# Patient Record
Sex: MALE | Race: WHITE | Employment: OTHER | URBAN - METROPOLITAN AREA
[De-identification: names, ages, dates, MRNs, and addresses within clinical notes are randomized per-mention and may not be internally consistent; named-entity substitution may affect disease eponyms.]

---

## 2017-03-07 ENCOUNTER — HOSPITAL ENCOUNTER (OUTPATIENT)
Dept: PHYSICAL THERAPY | Age: 82
Discharge: HOME | End: 2017-03-07
Payer: MEDICARE

## 2017-03-07 PROCEDURE — G8979 MOBILITY GOAL STATUS: HCPCS

## 2017-03-07 PROCEDURE — G8978 MOBILITY CURRENT STATUS: HCPCS

## 2017-03-07 PROCEDURE — 97162 PT EVAL MOD COMPLEX 30 MIN: CPT

## 2017-03-07 NOTE — PROGRESS NOTES
Ambulatory/Rehab Services H2 Model Falls Risk Assessment    Risk Factor Pts. ·   Confusion/Disorientation/Impulsivity  []    4 ·   Symptomatic Depression  []   2 ·   Altered Elimination  []   1 ·   Dizziness/Vertigo  []   1 ·   Gender (Male)  [x]   1 ·   Any administered antiepileptics (anticonvulsants):  []   2 ·   Any administered benzodiazepines:  []   1 ·   Visual Impairment (specify):  []   1 ·   Portable Oxygen Use  []   1 ·   Orthostatic ? BP  []   1 ·   History of Recent Falls (within 3 mos.)  []   5     Ability to Rise from Chair (choose one) Pts. ·   Ability to rise in a single movement  []   0 ·   Pushes up, successful in one attempt  [x]   1 ·   Multiple attempts, but successful  []   3 ·   Unable to rise without assistance  []   4   Total: (5 or greater = High Risk) 2     Falls Prevention Plan:   []                Physical Limitations to Exercise (specify):   []                Mobility Assistance Device (type):   []                Exercise/Equipment Adaptation (specify):    ©2010 [facility] [address] Patent #[id number].  Federal Law prohibits the replication, distribution or use without written permission from [facility]

## 2017-03-07 NOTE — PROGRESS NOTES
Chencho Harmon  : [facility] [address]  [phone]   [phone]        OUTPATIENT PHYSICAL THERAPY:Initial Assessment and Daily Note 3/7/2017    ICD-10: Treatment Diagnosis: low back pain M54.5  ICD-10: Treatment Diagnosis: stiffness in joint, right hip M25.651  ICD-10: Treatment Diagnosis: stiffness in joint, left hip M25.652  Precautions/Allergies:   Review of patient's allergies indicates no known allergies. Fall Risk Score: 2 (? 5 = High Risk)  MD Orders: evaluate and treat MEDICAL/REFERRING DIAGNOSIS:  Back pain [M54.9]   DATE OF ONSET: chronic  REFERRING PHYSICIAN: Nida Enriquez, *  RETURN PHYSICIAN APPOINTMENT: as needed     INITIAL ASSESSMENT:  Mr. Halie Sneed is a 81 y.o. male who presents to physical therapy with chronic low back, bilateral hip (left greater than right) stiffness and mid-thoracic spine discomfort. He presents with soft tissue restrictions, arthrokinematic dysfunctions in thoracic/lumbar spine and pelvis, postural deficits, strength and endurance deficits. Patient would benefit from skilled physical therapy to improve overall mobility and function. PROBLEM LIST (Impacting functional limitations):  1. Decreased Strength  2. Decreased ADL/Functional Activities  3. Decreased Ambulation Ability/Technique  4. Decreased Balance  5. Increased Pain  6. Decreased Activity Tolerance  7. Decreased Flexibility/Joint Mobility INTERVENTIONS PLANNED:  1. Balance Exercise  2. Heat  3. Home Exercise Program (HEP)  4. Manual Therapy  5. Neuromuscular Re-education/Strengthening  6. Range of Motion (ROM)  7. Therapeutic Activites  8. Therapeutic Exercise/Strengthening   TREATMENT PLAN:  Effective Dates: 3/7/2017 TO 2017. Frequency/Duration: 2 times a week for 6 weeks, progress to 1x a week for 6 weeks.   GOALS: (Goals have been discussed and agreed upon with patient.)  Short-Term Functional Goals: Time Frame: 4 weeks  1. Patient demonstrates independence with his HEP without verbal cueing from therapist.  2. Patient able to ambulate for 15 minutes without onset of low back pain. 3. Patient able to stand for 20 minute to perform household/daily activities with pain no more than 0-2/10  Discharge Goals: Time Frame: 12 weeks  1. Improve LE and core stability strength to 5/5 to perform ADLs  2. Patient able to ambulate for 30 minutes without onset of low back pain. 3. Patient demonstrates ability to perform balance activities for 2 minutes without loss of balance. 4. Improve LEFS score from 52/80 to 62/80 to perform ADLs  Rehabilitation Potential For Stated Goals: Excellent  Regarding Bailey Vega 's therapy, I certify that the treatment plan above will be carried out by a therapist or under their direction. Thank you for this referral,  Chris Martinez PT     Referring Physician Signature: Michela Castellano., *              Date                    The information in this section was collected on 3/7/2017 (except where otherwise noted). HISTORY:   History of Present Injury/Illness (Reason for Referral):  Chronic history of low back pain and mid-thoracic spine pain. Notes increased discomfort and tightness through low back, left greater than right and into left hip, especially with weight bearing and walking. Patient notes he has had both of his hips replaced and has had continued challenges with his left hip. He is an avid golfer, however has been limited secondary to his back pain. Patient denies dizziness, drop attacks, numbness/tingling, bowel/bladder dysfunction and/or unexplained weight gain/loss. Past Medical History/Comorbidities:   Mr. Elidia Morrell  has a past medical history of Abnormal cardiovascular function study (3/18/2016); Arthritis; Coronary atherosclerosis of native coronary vessel (3/18/2016); Coronary atherosclerosis of native coronary vessel (3/18/2016);  Dyspnea on exertion; HLD (hyperlipidemia) (3/18/2016); Hypertension; Seizures ([address] and Upper respiratory infection. Mr. Lyle Coombs  has a past surgical history that includes orthopaedic and other surgical. bilateral total hip replacements. Social History/Living Environment:     [facility]. No stairs in home. Prior Level of Function/Work/Activity:  Independent with tasks, however challenged with endurance and performance of activities secondary to pain levels. 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 Works out 1-2x a week, attempts to walk 4-5x a week. Dominant Side:         LEFT  Other Clinical Tests:          X-rays- arthritic changes in lumbar spine and hips. Current Medications:       Current Outpatient Prescriptions:     atorvastatin (LIPITOR) 40 mg tablet, Take 1 Tab by mouth daily. , Disp: 30 Tab, Rfl: 5    fluticasone (FLONASE) 50 mcg/actuation nasal spray, , Disp: , Rfl:     donepezil (ARICEPT) 5 mg tablet, , Disp: , Rfl:     lisinopril-hydrochlorothiazide (PRINZIDE, ZESTORETIC) 20-12.5 mg per tablet, , Disp: , Rfl:     tamsulosin (FLOMAX) 0.4 mg capsule, , Disp: , Rfl:     traZODone (DESYREL) 100 mg tablet, , Disp: , Rfl:     atenolol (TENORMIN) 25 mg tablet, Take 1 Tab by mouth daily. , Disp: 90 Tab, Rfl: 3    rosuvastatin (CRESTOR) 20 mg tablet, take 20 mg by mouth daily. , Disp: , Rfl:     olmesartan-hydrochlorothiazide (BENICAR HCT) 40-12.5 mg per tablet, take 1 Tab by mouth two (2) times a day., Disp: , Rfl:     NAPROXEN SODIUM (ALEVE PO), take  by mouth daily as needed. , Disp: , Rfl:    Date Last Reviewed:  3/7/2017     Number of Personal Factors/Comorbidities that affect the Plan of Care: 0: LOW COMPLEXITY   EXAMINATION:   Observation/Orthostatic Postural Assessment:          Patient denies any LE pain, paresthesia or symptoms. Patient denies any increase of symptoms with cough, sneeze or valsalva.  Patient denies any saddle paresthesia or bowel/bladder deficits. Patient exhibits a decreased lumbar lordosis and increased thoracic kyphosis. Lower extremity weight bearing is symmetrical. Shoulder symmetry exhibits right elevated. Observation of gait indicates decreased pelvic mobility (patient is a hip walker versus an efficient trunk walker). Lower quadrant bony landmarks are right iliac crest elevated compared to left, level greater trochanters in standing. Supine left malleolus superior compared to right Leg swing for innominate mobility indicates significant bilateral limitations in extension. Vertical compression test (VCT) for alignment is 2. Elbow flexion test (EFT) for motor activation is 2. Soft tissue observation indicates restrictions through bilateral iliopsoas, rectus femoris left greater than right, lumbar and thoracic spine paraspinals, left greater than right piriformis and hip rotators. Palpation: Myofascial assessment indicates restriction through mid-thoracic to lumbar spine superficial fascia. Muscle length testing in modified Berry position exhibits restricted left greater than right. Hamstring flexibility tested supine with straight leg raise (SLR) is restricted bilaterally, left 50, right 55 degrees. Piriformis length is restricted left greater than right. Quadriceps flexibility tested prone with heel to buttock is restricted bilaterally. Ely test is positive for rectus femoris tightness. Gastrocnemius and soleus flexibility are restricted, mild left greater than right. Sacrotuberous ligament is not tested today. Sacrococcygeal junction exhibits not tested today. Body of coccyx is not tested today. ROM: Passive trunk rotation is 70% available to the R and 70% available to the L. Kinetic testing in standing including forward bend test is positive left. Marcher's test is positive lefy. Pelvic shear test is standing exhibits decreased excursion of bilateral shear with a hard end feel.  Single plane active movements through lumbar spine in standing exhibit limited in all motions: flexion to mid-shin, extension limited by 50%. Functional squat for LE clearance is challenged with posterior translation of hip/pelvis. Lumbar positional testing at transverse processes indicates FRS right L4-5 with limitations in extension, rotation and side bending left, moderate restrictions. Sacral positional testing indicates mild right on right forward sacral torsion. Seated forward flexion test is positive left. Prone knee active flexion test is positive left. Spring testing of lumbar spine exhibits decreased p/a springs through L2-5. Spring testing of sacrum exhibits decreased p/a spring of left sacral base. Spring testing of innominates exhibits decreased left innominate spring. AROM (PROM) Right Left   Hip flexion/Innominate flexion 90 85   Hip extension/Innominate extension 5 5   Hip external rotation (ER) 20 15   Hip internal rotation (IR) 10 10   Hip abduction 40 40     Strength: Lumbar protective mechanism (LPM) are not tested today for initiation. LPM Strength */5   R anterior to posterior diagonal    L anterior to posterior diagonal    R posterior to anterior diagonal    L posterior to anterior diagonal      Manual Muscle Test (*/5) Right Left   Knee extension 4 4   Knee flexion 4+ 4+   Hip flexion 4 4-   Hip ER 4 4-   Hip IR 4 4-   Hip extension 3+ 3+   Hip abduction 4- 3+   Hip adduction 5 5   Ankle DF 5 5   Ankle PF 5 5   Core stability 3+/5     Special Tests:  Gonzella Running test is negative. Scours test is negative  Neurological Screen:  Myotomes: Key muscle strength testing through bilateral LE is intact. Dermatomes: Sensation testing through bilateral lower quadrants for light touch is intact. Reflexes: Patellar (L4) and Achilles (S1) are not tested*. Neural tension tests: Passive straight leg raise (SLR) test is negative. Slump test is negative.    Functional Mobility:  Challenged with squatting, prolonged standing, balance and ambulation. Body Structures Involved:  1. Nerves  2. Bones  3. Joints  4. Muscles Body Functions Affected:  1. Sensory/Pain  2. Neuromusculoskeletal  3. Movement Related Activities and Participation Affected:  1. Mobility  2. Community, Social and [place]   Number of elements (examined above) that affect the Plan of Care: 3: MODERATE COMPLEXITY   CLINICAL PRESENTATION:   Presentation: Evolving clinical presentation with changing clinical characteristics: MODERATE COMPLEXITY   CLINICAL DECISION MAKING:   Outcome Measure: Tool Used: Lower Extremity Functional Scale (LEFS)  Score:  Initial: 52/80 Most Recent: X/80 (Date: -- )   Interpretation of Score: 20 questions each scored on a 5 point scale with 0 representing \"extreme difficulty or unable to perform\" and 4 representing \"no difficulty\". The lower the score, the greater the functional disability. 80/80 represents no disability. Minimal detectable change is 9 points. Score 80 79-63 62-48 47-32 31-16 15-1 0   Modifier CH CI CJ CK CL CM CN     ? Mobility - Walking and Moving Around:     - CURRENT STATUS: CJ - 20%-39% impaired, limited or restricted    - GOAL STATUS: CI - 1%-19% impaired, limited or restricted    - D/C STATUS:  ---------------To be determined---------------    Medical Necessity:   · Patient is expected to demonstrate progress in strength, range of motion, balance and functional technique to increase independence with ADLs, prolonged standing and walking techniques. .  Reason for Services/Other Comments:  · Patient continues to require skilled intervention due to challenged with endurance and pain levels in weight bearing positions.    Use of outcome tool(s) and clinical judgement create a POC that gives a: Questionable prediction of patient's progress: MODERATE COMPLEXITY            TREATMENT:   (In addition to Assessment/Re-Assessment sessions the following treatments were rendered)    Pre-treatment Symptoms/Complaints:  Patient notes feels very tight and restricted throughout his hips and low back into his mid-thoracic spine. Pain: Initial:   Pain Intensity 1: 7  Pain Location 1: Hip, Spine, lumbar  Pain Orientation 1: Left, Posterior  Post Session:  3/10       Manual Therapy (    Soft Tissue Mobilization Duration  Duration: 5 Minutes): Manual techniques to facilitate improved motion and decreased pain. · Supine bilateral iliopsoas release with FMP of lower trunk rotation    (Used abbreviations: MET - muscle energy technique; PNF - proprioceptive neuromuscular facilitation; NMR - neuromuscular re-education; a/p - anterior to posterior; p/a - posterior to anterior, FMP - functional movement patterns)    Treatment/Session Assessment:    · Response to Treatment:  Improved anterior hip mobility and improved upright posture after manual techniques. · Compliance with Program/Exercises: compliant all of the time. · Recommendations/Intent for next treatment session: \"Next visit will focus on advancements to more challenging activities\".   Total Treatment Duration:  PT Patient Time In/Time Out  Time In: 0930  Time Out: 5500 Kesha Gerber, PT

## 2017-03-13 ENCOUNTER — HOSPITAL ENCOUNTER (OUTPATIENT)
Dept: PHYSICAL THERAPY | Age: 82
Discharge: HOME | End: 2017-03-13
Payer: MEDICARE

## 2017-03-13 PROCEDURE — 97140 MANUAL THERAPY 1/> REGIONS: CPT

## 2017-03-13 NOTE — PROGRESS NOTES
Jim Aquino  : [facility] [address] [place] [address]  [phone]   [phone]        OUTPATIENT PHYSICAL THERAPY:Daily Note 3/13/2017    ICD-10: Treatment Diagnosis: low back pain M54.5  ICD-10: Treatment Diagnosis: stiffness in joint, right hip M25.651  ICD-10: Treatment Diagnosis: stiffness in joint, left hip M25.652  Precautions/Allergies:   Review of patient's allergies indicates no known allergies. Fall Risk Score: 2 (? 5 = High Risk)  MD Orders: evaluate and treat MEDICAL/REFERRING DIAGNOSIS:  Back pain [M54.9]   DATE OF ONSET: chronic  REFERRING PHYSICIAN: Jose Antonio Oliveros., *  RETURN PHYSICIAN APPOINTMENT: as needed     INITIAL ASSESSMENT:  Mr. Marychuy Edmondson is a 81 y.o. male who presents to physical therapy with chronic low back, bilateral hip (left greater than right) stiffness and mid-thoracic spine discomfort. He presents with soft tissue restrictions, arthrokinematic dysfunctions in thoracic/lumbar spine and pelvis, postural deficits, strength and endurance deficits. Patient would benefit from skilled physical therapy to improve overall mobility and function. PROBLEM LIST (Impacting functional limitations):  1. Decreased Strength  2. Decreased ADL/Functional Activities  3. Decreased Ambulation Ability/Technique  4. Decreased Balance  5. Increased Pain  6. Decreased Activity Tolerance  7. Decreased Flexibility/Joint Mobility INTERVENTIONS PLANNED:  1. Balance Exercise  2. Heat  3. Home Exercise Program (HEP)  4. Manual Therapy  5. Neuromuscular Re-education/Strengthening  6. Range of Motion (ROM)  7. Therapeutic Activites  8. Therapeutic Exercise/Strengthening   TREATMENT PLAN:  Effective Dates: 3/7/2017 TO 2017. Frequency/Duration: 2 times a week for 6 weeks, progress to 1x a week for 6 weeks.   GOALS: (Goals have been discussed and agreed upon with patient.)  Short-Term Functional Goals: Time Frame: 4 weeks  1. Patient demonstrates independence with his HEP without verbal cueing from therapist.  2. Patient able to ambulate for 15 minutes without onset of low back pain. 3. Patient able to stand for 20 minute to perform household/daily activities with pain no more than 0-2/10  Discharge Goals: Time Frame: 12 weeks  1. Improve LE and core stability strength to 5/5 to perform ADLs  2. Patient able to ambulate for 30 minutes without onset of low back pain. 3. Patient demonstrates ability to perform balance activities for 2 minutes without loss of balance. 4. Improve LEFS score from 52/80 to 62/80 to perform ADLs  Rehabilitation Potential For Stated Goals: Excellent  Regarding [facility] therapy, I certify that the treatment plan above will be carried out by a therapist or under their direction. Thank you for this referral,  Bobby Price, PT     Referring Physician Signature: Idalia Vazquez., *              Date                    The information in this section was collected on 3/7/2017 (except where otherwise noted). HISTORY:   History of Present Injury/Illness (Reason for Referral):  Chronic history of low back pain and mid-thoracic spine pain. Notes increased discomfort and tightness through low back, left greater than right and into left hip, especially with weight bearing and walking. Patient notes he has had both of his hips replaced and has had continued challenges with his left hip. He is an avid golfer, however has been limited secondary to his back pain. Patient denies dizziness, drop attacks, numbness/tingling, bowel/bladder dysfunction and/or unexplained weight gain/loss. Past Medical History/Comorbidities:   Mr. Boby Akbar  has a past medical history of Abnormal cardiovascular function study (3/18/2016); Arthritis; Coronary atherosclerosis of native coronary vessel (3/18/2016); Coronary atherosclerosis of native coronary vessel (3/18/2016);  Dyspnea on exertion; HLD (hyperlipidemia) (3/18/2016); Hypertension; Seizures ([address] and Upper respiratory infection. Mr. Gerda Davalos  has a past surgical history that includes orthopaedic and other surgical. bilateral total hip replacements. Social History/Living Environment:     Lives at independent living facility [facility], with his wife. No stairs in home. Prior Level of Function/Work/Activity:  Independent with tasks, however challenged with endurance and performance of activities secondary to pain levels. 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 Works out 1-2x a week, attempts to walk 4-5x a week. Dominant Side:         LEFT  Other Clinical Tests:          X-rays- arthritic changes in lumbar spine and hips. Current Medications:       Current Outpatient Prescriptions:     atorvastatin (LIPITOR) 40 mg tablet, Take 1 Tab by mouth daily. , Disp: 30 Tab, Rfl: 5    fluticasone (FLONASE) 50 mcg/actuation nasal spray, , Disp: , Rfl:     donepezil (ARICEPT) 5 mg tablet, , Disp: , Rfl:     lisinopril-hydrochlorothiazide (PRINZIDE, ZESTORETIC) 20-12.5 mg per tablet, , Disp: , Rfl:     tamsulosin (FLOMAX) 0.4 mg capsule, , Disp: , Rfl:     traZODone (DESYREL) 100 mg tablet, , Disp: , Rfl:     atenolol (TENORMIN) 25 mg tablet, Take 1 Tab by mouth daily. , Disp: 90 Tab, Rfl: 3    rosuvastatin (CRESTOR) 20 mg tablet, take 20 mg by mouth daily. , Disp: , Rfl:     olmesartan-hydrochlorothiazide (BENICAR HCT) 40-12.5 mg per tablet, take 1 Tab by mouth two (2) times a day., Disp: , Rfl:     NAPROXEN SODIUM (ALEVE PO), take  by mouth daily as needed. , Disp: , Rfl:    Date Last Reviewed:  3/13/2017     Number of Personal Factors/Comorbidities that affect the Plan of Care: 0: LOW COMPLEXITY   EXAMINATION:   Observation/Orthostatic Postural Assessment:          Patient denies any LE pain, paresthesia or symptoms. Patient denies any increase of symptoms with cough, sneeze or valsalva.  Patient denies any saddle paresthesia or bowel/bladder deficits. Patient exhibits a decreased lumbar lordosis and increased thoracic kyphosis. Lower extremity weight bearing is symmetrical. Shoulder symmetry exhibits right elevated. Observation of gait indicates decreased pelvic mobility (patient is a hip walker versus an efficient trunk walker). Lower quadrant bony landmarks are right iliac crest elevated compared to left, level greater trochanters in standing. Supine left malleolus superior compared to right Leg swing for innominate mobility indicates significant bilateral limitations in extension. Vertical compression test (VCT) for alignment is 2. Elbow flexion test (EFT) for motor activation is 2. Soft tissue observation indicates restrictions through bilateral iliopsoas, rectus femoris left greater than right, lumbar and thoracic spine paraspinals, left greater than right piriformis and hip rotators. Palpation: Myofascial assessment indicates restriction through mid-thoracic to lumbar spine superficial fascia. Muscle length testing in modified Berry position exhibits restricted left greater than right. Hamstring flexibility tested supine with straight leg raise (SLR) is restricted bilaterally, left 50, right 55 degrees. Piriformis length is restricted left greater than right. Quadriceps flexibility tested prone with heel to buttock is restricted bilaterally. Ely test is positive for rectus femoris tightness. Gastrocnemius and soleus flexibility are restricted, mild left greater than right. Sacrotuberous ligament is not tested today. Sacrococcygeal junction exhibits not tested today. Body of coccyx is not tested today. ROM: Passive trunk rotation is 70% available to the R and 70% available to the L. Kinetic testing in standing including forward bend test is positive left. Marcher's test is positive lefy. Pelvic shear test is standing exhibits decreased excursion of bilateral shear with a hard end feel.  Single plane active movements through lumbar spine in standing exhibit limited in all motions: flexion to mid-shin, extension limited by 50%. Functional squat for LE clearance is challenged with posterior translation of hip/pelvis. Lumbar positional testing at transverse processes indicates FRS right L4-5 with limitations in extension, rotation and side bending left, moderate restrictions. Sacral positional testing indicates mild right on right forward sacral torsion. Seated forward flexion test is positive left. Prone knee active flexion test is positive left. Spring testing of lumbar spine exhibits decreased p/a springs through L2-5. Spring testing of sacrum exhibits decreased p/a spring of left sacral base. Spring testing of innominates exhibits decreased left innominate spring. AROM (PROM) Right Left   Hip flexion/Innominate flexion 90 85   Hip extension/Innominate extension 5 5   Hip external rotation (ER) 20 15   Hip internal rotation (IR) 10 10   Hip abduction 40 40     Strength: Lumbar protective mechanism (LPM) are not tested today for initiation. LPM Strength */5   R anterior to posterior diagonal    L anterior to posterior diagonal    R posterior to anterior diagonal    L posterior to anterior diagonal      Manual Muscle Test (*/5) Right Left   Knee extension 4 4   Knee flexion 4+ 4+   Hip flexion 4 4-   Hip ER 4 4-   Hip IR 4 4-   Hip extension 3+ 3+   Hip abduction 4- 3+   Hip adduction 5 5   Ankle DF 5 5   Ankle PF 5 5   Core stability 3+/5     Special Tests:  Ciara Brain test is negative. Scours test is negative  Neurological Screen:  Myotomes: Key muscle strength testing through bilateral LE is intact. Dermatomes: Sensation testing through bilateral lower quadrants for light touch is intact. Reflexes: Patellar (L4) and Achilles (S1) are not tested*. Neural tension tests: Passive straight leg raise (SLR) test is negative. Slump test is negative.    Functional Mobility:  Challenged with squatting, prolonged standing, balance and ambulation. Body Structures Involved:  1. Nerves  2. Bones  3. Joints  4. Muscles Body Functions Affected:  1. Sensory/Pain  2. Neuromusculoskeletal  3. Movement Related Activities and Participation Affected:  1. Mobility  2. Community, Social and [place]   Number of elements (examined above) that affect the Plan of Care: 3: MODERATE COMPLEXITY   CLINICAL PRESENTATION:   Presentation: Evolving clinical presentation with changing clinical characteristics: MODERATE COMPLEXITY   CLINICAL DECISION MAKING:   Outcome Measure: Tool Used: Lower Extremity Functional Scale (LEFS)  Score:  Initial: 52/80 Most Recent: X/80 (Date: -- )   Interpretation of Score: 20 questions each scored on a 5 point scale with 0 representing \"extreme difficulty or unable to perform\" and 4 representing \"no difficulty\". The lower the score, the greater the functional disability. 80/80 represents no disability. Minimal detectable change is 9 points. Score 80 79-63 62-48 47-32 31-16 15-1 0   Modifier CH CI CJ CK CL CM CN     ? Mobility - Walking and Moving Around:     - CURRENT STATUS: CJ - 20%-39% impaired, limited or restricted    - GOAL STATUS: CI - 1%-19% impaired, limited or restricted    - D/C STATUS:  ---------------To be determined---------------    Medical Necessity:   · Patient is expected to demonstrate progress in strength, range of motion, balance and functional technique to increase independence with ADLs, prolonged standing and walking techniques. .  Reason for Services/Other Comments:  · Patient continues to require skilled intervention due to challenged with endurance and pain levels in weight bearing positions.    Use of outcome tool(s) and clinical judgement create a POC that gives a: Questionable prediction of patient's progress: MODERATE COMPLEXITY            TREATMENT:   (In addition to Assessment/Re-Assessment sessions the following treatments were rendered)    Pre-treatment Symptoms/Complaints: Patient notes was running late today since his wife had a dizziness spell earlier and lost track of time. Notes left lumbar spine most uncomfortable over the weekend with weight bearing and prolonged positions. Pain: Initial:   Pain Intensity 1: 6  Pain Location 1: Hip, Spine, lumbar  Pain Orientation 1: Left  Post Session:  3/10      Therapeutic Exercise: (5 Minutes):  Exercises per grid below to improve mobility, strength, balance and coordination. Required minimal visual and verbal cues to promote proper body alignment, promote proper body posture and promote proper body mechanics. Progressed resistance, range, repetitions and complexity of movement as indicated. Date:  3/13/2017   Activity/Exercise Parameters   Single knee to chest X 5 reps, 25 sec holds BLE                                 Manual Therapy (    Soft Tissue Mobilization Duration  Duration: 25 Minutes): Manual techniques to facilitate improved motion and decreased pain. · Supine bilateral iliopsoas release with FMP of lower trunk rotation. · Prone soft tissue mobilization to bilateral thoracic and lumbar spine paraspinals, with strumming techniques and release around bony contours left greater than right of iliac crest and sacral sulcus  · Prone bilateral hip on axis mobilization with manual resistance into hip IR. · Prone bilateral knee flexion/rectus femoris stretches, contract/relax techniques. (Used abbreviations: MET - muscle energy technique; PNF - proprioceptive neuromuscular facilitation; NMR - neuromuscular re-education; a/p - anterior to posterior; p/a - posterior to anterior, FMP - functional movement patterns)    Treatment/Session Assessment:    · Response to Treatment:  Improved mobility noted in lumbar spine and pelvis, shorter session secondary to patient arrived 30 minutes late. · Compliance with Program/Exercises: compliant all of the time. · Recommendations/Intent for next treatment session:  \"Next visit will focus on advancements to more challenging activities\".   Total Treatment Duration:  PT Patient Time In/Time Out  Time In: 1400  Time Out: Rodolfo Dallas, PT

## 2017-03-15 ENCOUNTER — HOSPITAL ENCOUNTER (OUTPATIENT)
Dept: PHYSICAL THERAPY | Age: 82
Discharge: HOME | End: 2017-03-15
Payer: MEDICARE

## 2017-03-15 PROCEDURE — 97110 THERAPEUTIC EXERCISES: CPT

## 2017-03-15 PROCEDURE — 97140 MANUAL THERAPY 1/> REGIONS: CPT

## 2017-03-15 NOTE — PROGRESS NOTES
Alaina Shah  : [facility] [address] [place] [address]  [phone]   [phone]        OUTPATIENT PHYSICAL THERAPY:Daily Note 3/15/2017    ICD-10: Treatment Diagnosis: low back pain M54.5  ICD-10: Treatment Diagnosis: stiffness in joint, right hip M25.651  ICD-10: Treatment Diagnosis: stiffness in joint, left hip M25.652  Precautions/Allergies:   Review of patient's allergies indicates no known allergies. Fall Risk Score: 2 (? 5 = High Risk)  MD Orders: evaluate and treat MEDICAL/REFERRING DIAGNOSIS:  Back pain [M54.9]   DATE OF ONSET: chronic  REFERRING PHYSICIAN: Virginia Campoverde., *  RETURN PHYSICIAN APPOINTMENT: as needed     INITIAL ASSESSMENT:  Mr. Bernie Curry is a 81 y.o. male who presents to physical therapy with chronic low back, bilateral hip (left greater than right) stiffness and mid-thoracic spine discomfort. He presents with soft tissue restrictions, arthrokinematic dysfunctions in thoracic/lumbar spine and pelvis, postural deficits, strength and endurance deficits. Patient would benefit from skilled physical therapy to improve overall mobility and function. PROBLEM LIST (Impacting functional limitations):  1. Decreased Strength  2. Decreased ADL/Functional Activities  3. Decreased Ambulation Ability/Technique  4. Decreased Balance  5. Increased Pain  6. Decreased Activity Tolerance  7. Decreased Flexibility/Joint Mobility INTERVENTIONS PLANNED:  1. Balance Exercise  2. Heat  3. Home Exercise Program (HEP)  4. Manual Therapy  5. Neuromuscular Re-education/Strengthening  6. Range of Motion (ROM)  7. Therapeutic Activites  8. Therapeutic Exercise/Strengthening   TREATMENT PLAN:  Effective Dates: 3/7/2017 TO 2017. Frequency/Duration: 2 times a week for 6 weeks, progress to 1x a week for 6 weeks.   GOALS: (Goals have been discussed and agreed upon with patient.)  Short-Term Functional Goals: Time Frame: 4 weeks  1. Patient demonstrates independence with his HEP without verbal cueing from therapist.  2. Patient able to ambulate for 15 minutes without onset of low back pain. 3. Patient able to stand for 20 minute to perform household/daily activities with pain no more than 0-2/10  Discharge Goals: Time Frame: 12 weeks  1. Improve LE and core stability strength to 5/5 to perform ADLs  2. Patient able to ambulate for 30 minutes without onset of low back pain. 3. Patient demonstrates ability to perform balance activities for 2 minutes without loss of balance. 4. Improve LEFS score from 52/80 to 62/80 to perform ADLs  Rehabilitation Potential For Stated Goals: Excellent  Regarding [facility] therapy, I certify that the treatment plan above will be carried out by a therapist or under their direction. Thank you for this referral,  See [facility]     Referring Physician Signature: Alex Ruff., *              Date                    The information in this section was collected on 3/7/2017 (except where otherwise noted). HISTORY:   History of Present Injury/Illness (Reason for Referral):  Chronic history of low back pain and mid-thoracic spine pain. Notes increased discomfort and tightness through low back, left greater than right and into left hip, especially with weight bearing and walking. Patient notes he has had both of his hips replaced and has had continued challenges with his left hip. He is an avid golfer, however has been limited secondary to his back pain. Patient denies dizziness, drop attacks, numbness/tingling, bowel/bladder dysfunction and/or unexplained weight gain/loss. Past Medical History/Comorbidities:   Mr. Garza  has a past medical history of Abnormal cardiovascular function study (3/18/2016); Arthritis; Coronary atherosclerosis of native coronary vessel (3/18/2016); Coronary atherosclerosis of native coronary vessel (3/18/2016);  Dyspnea on exertion; HLD (hyperlipidemia) (3/18/2016); Hypertension; Seizures ([address] and Upper respiratory infection. Mr. Zcaarias Fried  has a past surgical history that includes orthopaedic and other surgical. bilateral total hip replacements. Social History/Living Environment:     Lives at independent living facility [facility], with his wife. No stairs in home. Prior Level of Function/Work/Activity:  Independent with tasks, however challenged with endurance and performance of activities secondary to pain levels. 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 Works out 1-2x a week, attempts to walk 4-5x a week. Dominant Side:         LEFT  Other Clinical Tests:          X-rays- arthritic changes in lumbar spine and hips. Current Medications:       Current Outpatient Prescriptions:     atorvastatin (LIPITOR) 40 mg tablet, Take 1 Tab by mouth daily. , Disp: 30 Tab, Rfl: 5    fluticasone (FLONASE) 50 mcg/actuation nasal spray, , Disp: , Rfl:     donepezil (ARICEPT) 5 mg tablet, , Disp: , Rfl:     lisinopril-hydrochlorothiazide (PRINZIDE, ZESTORETIC) 20-12.5 mg per tablet, , Disp: , Rfl:     tamsulosin (FLOMAX) 0.4 mg capsule, , Disp: , Rfl:     traZODone (DESYREL) 100 mg tablet, , Disp: , Rfl:     atenolol (TENORMIN) 25 mg tablet, Take 1 Tab by mouth daily. , Disp: 90 Tab, Rfl: 3    rosuvastatin (CRESTOR) 20 mg tablet, take 20 mg by mouth daily. , Disp: , Rfl:     olmesartan-hydrochlorothiazide (BENICAR HCT) 40-12.5 mg per tablet, take 1 Tab by mouth two (2) times a day., Disp: , Rfl:     NAPROXEN SODIUM (ALEVE PO), take  by mouth daily as needed. , Disp: , Rfl:    Date Last Reviewed:  3/15/2017     Number of Personal Factors/Comorbidities that affect the Plan of Care: 0: LOW COMPLEXITY   EXAMINATION:   Observation/Orthostatic Postural Assessment:          Patient denies any LE pain, paresthesia or symptoms. Patient denies any increase of symptoms with cough, sneeze or valsalva.  Patient denies any saddle paresthesia or bowel/bladder deficits. Patient exhibits a decreased lumbar lordosis and increased thoracic kyphosis. Lower extremity weight bearing is symmetrical. Shoulder symmetry exhibits right elevated. Observation of gait indicates decreased pelvic mobility (patient is a hip walker versus an efficient trunk walker). Lower quadrant bony landmarks are right iliac crest elevated compared to left, level greater trochanters in standing. Supine left malleolus superior compared to right Leg swing for innominate mobility indicates significant bilateral limitations in extension. Vertical compression test (VCT) for alignment is 2. Elbow flexion test (EFT) for motor activation is 2. Soft tissue observation indicates restrictions through bilateral iliopsoas, rectus femoris left greater than right, lumbar and thoracic spine paraspinals, left greater than right piriformis and hip rotators. Palpation: Myofascial assessment indicates restriction through mid-thoracic to lumbar spine superficial fascia. Muscle length testing in modified Berry position exhibits restricted left greater than right. Hamstring flexibility tested supine with straight leg raise (SLR) is restricted bilaterally, left 50, right 55 degrees. Piriformis length is restricted left greater than right. Quadriceps flexibility tested prone with heel to buttock is restricted bilaterally. Ely test is positive for rectus femoris tightness. Gastrocnemius and soleus flexibility are restricted, mild left greater than right. Sacrotuberous ligament is not tested today. Sacrococcygeal junction exhibits not tested today. Body of coccyx is not tested today. ROM: Passive trunk rotation is 70% available to the R and 70% available to the L. Kinetic testing in standing including forward bend test is positive left. Marcher's test is positive lefy. Pelvic shear test is standing exhibits decreased excursion of bilateral shear with a hard end feel.  Single plane active movements through lumbar spine in standing exhibit limited in all motions: flexion to mid-shin, extension limited by 50%. Functional squat for LE clearance is challenged with posterior translation of hip/pelvis. Lumbar positional testing at transverse processes indicates FRS right L4-5 with limitations in extension, rotation and side bending left, moderate restrictions. Sacral positional testing indicates mild right on right forward sacral torsion. Seated forward flexion test is positive left. Prone knee active flexion test is positive left. Spring testing of lumbar spine exhibits decreased p/a springs through L2-5. Spring testing of sacrum exhibits decreased p/a spring of left sacral base. Spring testing of innominates exhibits decreased left innominate spring. AROM (PROM) Right Left   Hip flexion/Innominate flexion 90 85   Hip extension/Innominate extension 5 5   Hip external rotation (ER) 20 15   Hip internal rotation (IR) 10 10   Hip abduction 40 40     Strength: Lumbar protective mechanism (LPM) are not tested today for initiation. LPM Strength */5   R anterior to posterior diagonal    L anterior to posterior diagonal    R posterior to anterior diagonal    L posterior to anterior diagonal      Manual Muscle Test (*/5) Right Left   Knee extension 4 4   Knee flexion 4+ 4+   Hip flexion 4 4-   Hip ER 4 4-   Hip IR 4 4-   Hip extension 3+ 3+   Hip abduction 4- 3+   Hip adduction 5 5   Ankle DF 5 5   Ankle PF 5 5   Core stability 3+/5     Special Tests:  Roosvelt Grapes test is negative. Scours test is negative  Neurological Screen:  Myotomes: Key muscle strength testing through bilateral LE is intact. Dermatomes: Sensation testing through bilateral lower quadrants for light touch is intact. Reflexes: Patellar (L4) and Achilles (S1) are not tested*. Neural tension tests: Passive straight leg raise (SLR) test is negative. Slump test is negative.    Functional Mobility:  Challenged with squatting, prolonged standing, balance and ambulation. Body Structures Involved:  1. Nerves  2. Bones  3. Joints  4. Muscles Body Functions Affected:  1. Sensory/Pain  2. Neuromusculoskeletal  3. Movement Related Activities and Participation Affected:  1. Mobility  2. Community, Social and [place]   Number of elements (examined above) that affect the Plan of Care: 3: MODERATE COMPLEXITY   CLINICAL PRESENTATION:   Presentation: Evolving clinical presentation with changing clinical characteristics: MODERATE COMPLEXITY   CLINICAL DECISION MAKING:   Outcome Measure: Tool Used: Lower Extremity Functional Scale (LEFS)  Score:  Initial: 52/80 Most Recent: X/80 (Date: -- )   Interpretation of Score: 20 questions each scored on a 5 point scale with 0 representing \"extreme difficulty or unable to perform\" and 4 representing \"no difficulty\". The lower the score, the greater the functional disability. 80/80 represents no disability. Minimal detectable change is 9 points. Score 80 79-63 62-48 47-32 31-16 15-1 0   Modifier CH CI CJ CK CL CM CN     ? Mobility - Walking and Moving Around:     - CURRENT STATUS: CJ - 20%-39% impaired, limited or restricted    - GOAL STATUS: CI - 1%-19% impaired, limited or restricted    - D/C STATUS:  ---------------To be determined---------------    Medical Necessity:   · Patient is expected to demonstrate progress in strength, range of motion, balance and functional technique to increase independence with ADLs, prolonged standing and walking techniques. .  Reason for Services/Other Comments:  · Patient continues to require skilled intervention due to challenged with endurance and pain levels in weight bearing positions.    Use of outcome tool(s) and clinical judgement create a POC that gives a: Questionable prediction of patient's progress: MODERATE COMPLEXITY            TREATMENT:   (In addition to Assessment/Re-Assessment sessions the following treatments were rendered)    Pre-treatment Symptoms/Complaints: Patient felt better after last session, less discomfort into the back for a few days. Still notes some mid-back pain and tenderness. Pain: Initial:   Pain Intensity 1: 5  Pain Location 1: Hip, Spine, lumbar  Pain Orientation 1: Left  Post Session:  2/10      Therapeutic Exercise: (15 Minutes):  Exercises per grid below to improve mobility, strength, balance and coordination. Required minimal visual and verbal cues to promote proper body alignment, promote proper body posture and promote proper body mechanics. Progressed resistance, range, repetitions and complexity of movement as indicated. Date:  3/15/2017   Activity/Exercise Parameters   Single knee to chest X 5 reps, 25 sec holds BLE   Supine hamstrings stretch X 5 reps, 20 sec holds, BLE   Supine lower trunk rotation X 10 reps BLE   Supine abdominal brace 90/90 X 10 reps, 5 sec holds                     Manual Therapy (    Soft Tissue Mobilization Duration  Duration: 45 Minutes): Manual techniques to facilitate improved motion and decreased pain. · Supine bilateral iliopsoas release with FMP of lower trunk rotation. · Prone soft tissue mobilization to bilateral thoracic and lumbar spine paraspinals, with strumming techniques and release around bony contours left greater than right of iliac       Crest, sacral sulcus and lower border of rib cage. · Prone bilateral hip on axis mobilization with manual resistance into hip IR. · Prone bilateral knee flexion/rectus femoris stretches, contract/relax techniques. (Used abbreviations: MET - muscle energy technique; PNF - proprioceptive neuromuscular facilitation; NMR - neuromuscular re-education; a/p - anterior to posterior; p/a - posterior to anterior, FMP - functional movement patterns)    Treatment/Session Assessment:    · Response to Treatment:  Improving overall mobility noted in thoracic and lumbar spine, with decreased soft tissue restrictions.   · Compliance with Program/Exercises: compliant all of the time. · Recommendations/Intent for next treatment session: \"Next visit will focus on advancements to more challenging activities\".   Total Treatment Duration:  PT Patient Time In/Time Out  Time In: 0730  Time Out: 1420 [facility] Juan F Islas, PT

## 2017-03-22 ENCOUNTER — HOSPITAL ENCOUNTER (OUTPATIENT)
Dept: PHYSICAL THERAPY | Age: 82
Discharge: HOME | End: 2017-03-22
Payer: MEDICARE

## 2017-03-22 PROCEDURE — 97140 MANUAL THERAPY 1/> REGIONS: CPT

## 2017-03-22 PROCEDURE — 97110 THERAPEUTIC EXERCISES: CPT

## 2017-03-23 ENCOUNTER — HOSPITAL ENCOUNTER (OUTPATIENT)
Dept: PHYSICAL THERAPY | Age: 82
End: 2017-03-23
Payer: MEDICARE

## 2017-03-23 NOTE — PROGRESS NOTES
Cristel Borges  : [facility] at [address] [place] [address]  [phone]   [phone]        OUTPATIENT PHYSICAL THERAPY:Daily Note 3/22/2017    ICD-10: Treatment Diagnosis: low back pain M54.5  ICD-10: Treatment Diagnosis: stiffness in joint, right hip M25.651  ICD-10: Treatment Diagnosis: stiffness in joint, left hip M25.652  Precautions/Allergies:   Review of patient's allergies indicates no known allergies. Fall Risk Score: 2 (? 5 = High Risk)  MD Orders: evaluate and treat MEDICAL/REFERRING DIAGNOSIS:  Back pain [M54.9]   DATE OF ONSET: chronic  REFERRING PHYSICIAN: Giovanna Solorzano, *  RETURN PHYSICIAN APPOINTMENT: as needed     INITIAL ASSESSMENT:  Mr. Riddhi Chang is a 81 y.o. male who presents to physical therapy with chronic low back, bilateral hip (left greater than right) stiffness and mid-thoracic spine discomfort. He presents with soft tissue restrictions, arthrokinematic dysfunctions in thoracic/lumbar spine and pelvis, postural deficits, strength and endurance deficits. Patient would benefit from skilled physical therapy to improve overall mobility and function. PROBLEM LIST (Impacting functional limitations):  1. Decreased Strength  2. Decreased ADL/Functional Activities  3. Decreased Ambulation Ability/Technique  4. Decreased Balance  5. Increased Pain  6. Decreased Activity Tolerance  7. Decreased Flexibility/Joint Mobility INTERVENTIONS PLANNED:  1. Balance Exercise  2. Heat  3. Home Exercise Program (HEP)  4. Manual Therapy  5. Neuromuscular Re-education/Strengthening  6. Range of Motion (ROM)  7. Therapeutic Activites  8. Therapeutic Exercise/Strengthening   TREATMENT PLAN:  Effective Dates: 3/7/2017 TO 2017. Frequency/Duration: 2 times a week for 6 weeks, progress to 1x a week for 6 weeks.   GOALS: (Goals have been discussed and agreed upon with patient.)  Short-Term Functional Goals: Time Frame: 4 weeks  1. Patient demonstrates independence with his HEP without verbal cueing from therapist.  2. Patient able to ambulate for 15 minutes without onset of low back pain. 3. Patient able to stand for 20 minute to perform household/daily activities with pain no more than 0-2/10  Discharge Goals: Time Frame: 12 weeks  1. Improve LE and core stability strength to 5/5 to perform ADLs  2. Patient able to ambulate for 30 minutes without onset of low back pain. 3. Patient demonstrates ability to perform balance activities for 2 minutes without loss of balance. 4. Improve LEFS score from 52/80 to 62/80 to perform ADLs  Rehabilitation Potential For Stated Goals: Excellent  Regarding Jax Kaurost's therapy, I certify that the treatment plan above will be carried out by a therapist or under their direction. Thank you for this referral,  Alok Del Toro, PT     Referring Physician Signature: Kathy Garcia., *              Date                    The information in this section was collected on 3/7/2017 (except where otherwise noted). HISTORY:   History of Present Injury/Illness (Reason for Referral):  Chronic history of low back pain and mid-thoracic spine pain. Notes increased discomfort and tightness through low back, left greater than right and into left hip, especially with weight bearing and walking. Patient notes he has had both of his hips replaced and has had continued challenges with his left hip. He is an avid golfer, however has been limited secondary to his back pain. Patient denies dizziness, drop attacks, numbness/tingling, bowel/bladder dysfunction and/or unexplained weight gain/loss. Past Medical History/Comorbidities:   Mr. Cody Lovelace  has a past medical history of Abnormal cardiovascular function study (3/18/2016); Arthritis; Coronary atherosclerosis of native coronary vessel (3/18/2016); Coronary atherosclerosis of native coronary vessel (3/18/2016);  Dyspnea on exertion; HLD (hyperlipidemia) (3/18/2016); Hypertension; Seizures ([address] and Upper respiratory infection. Mr. Lyle Coombs  has a past surgical history that includes orthopaedic and other surgical. bilateral total hip replacements. Social History/Living Environment:     Lives at independent living facility [facility], with his wife. No stairs in home. Prior Level of Function/Work/Activity:  Independent with tasks, however challenged with endurance and performance of activities secondary to pain levels. Retired from business owner of fitness facilities. Works out 1-2x a week, attempts to walk 4-5x a week. Dominant Side:         LEFT  Other Clinical Tests:          X-rays- arthritic changes in lumbar spine and hips. Current Medications:       Current Outpatient Prescriptions:     atorvastatin (LIPITOR) 40 mg tablet, Take 1 Tab by mouth daily. , Disp: 30 Tab, Rfl: 5    fluticasone (FLONASE) 50 mcg/actuation nasal spray, , Disp: , Rfl:     donepezil (ARICEPT) 5 mg tablet, , Disp: , Rfl:     lisinopril-hydrochlorothiazide (PRINZIDE, ZESTORETIC) 20-12.5 mg per tablet, , Disp: , Rfl:     tamsulosin (FLOMAX) 0.4 mg capsule, , Disp: , Rfl:     traZODone (DESYREL) 100 mg tablet, , Disp: , Rfl:     atenolol (TENORMIN) 25 mg tablet, Take 1 Tab by mouth daily. , Disp: 90 Tab, Rfl: 3    rosuvastatin (CRESTOR) 20 mg tablet, take 20 mg by mouth daily. , Disp: , Rfl:     olmesartan-hydrochlorothiazide (BENICAR HCT) 40-12.5 mg per tablet, take 1 Tab by mouth two (2) times a day., Disp: , Rfl:     NAPROXEN SODIUM (ALEVE PO), take  by mouth daily as needed. , Disp: , Rfl:    Date Last Reviewed:  3/22/2017     Number of Personal Factors/Comorbidities that affect the Plan of Care: 0: LOW COMPLEXITY   EXAMINATION:   Observation/Orthostatic Postural Assessment:          Patient denies any LE pain, paresthesia or symptoms. Patient denies any increase of symptoms with cough, sneeze or valsalva.  Patient denies any saddle paresthesia or bowel/bladder deficits. Patient exhibits a decreased lumbar lordosis and increased thoracic kyphosis. Lower extremity weight bearing is symmetrical. Shoulder symmetry exhibits right elevated. Observation of gait indicates decreased pelvic mobility (patient is a hip walker versus an efficient trunk walker). Lower quadrant bony landmarks are right iliac crest elevated compared to left, level greater trochanters in standing. Supine left malleolus superior compared to right Leg swing for innominate mobility indicates significant bilateral limitations in extension. Vertical compression test (VCT) for alignment is 2. Elbow flexion test (EFT) for motor activation is 2. Soft tissue observation indicates restrictions through bilateral iliopsoas, rectus femoris left greater than right, lumbar and thoracic spine paraspinals, left greater than right piriformis and hip rotators. Palpation: Myofascial assessment indicates restriction through mid-thoracic to lumbar spine superficial fascia. Muscle length testing in modified Berry position exhibits restricted left greater than right. Hamstring flexibility tested supine with straight leg raise (SLR) is restricted bilaterally, left 50, right 55 degrees. Piriformis length is restricted left greater than right. Quadriceps flexibility tested prone with heel to buttock is restricted bilaterally. Ely test is positive for rectus femoris tightness. Gastrocnemius and soleus flexibility are restricted, mild left greater than right. Sacrotuberous ligament is not tested today. Sacrococcygeal junction exhibits not tested today. Body of coccyx is not tested today. ROM: Passive trunk rotation is 70% available to the R and 70% available to the L. Kinetic testing in standing including forward bend test is positive left. Marcher's test is positive lefy. Pelvic shear test is standing exhibits decreased excursion of bilateral shear with a hard end feel.  Single plane active movements through lumbar spine in standing exhibit limited in all motions: flexion to mid-shin, extension limited by 50%. Functional squat for LE clearance is challenged with posterior translation of hip/pelvis. Lumbar positional testing at transverse processes indicates FRS right L4-5 with limitations in extension, rotation and side bending left, moderate restrictions. Sacral positional testing indicates mild right on right forward sacral torsion. Seated forward flexion test is positive left. Prone knee active flexion test is positive left. Spring testing of lumbar spine exhibits decreased p/a springs through L2-5. Spring testing of sacrum exhibits decreased p/a spring of left sacral base. Spring testing of innominates exhibits decreased left innominate spring. AROM (PROM) Right Left   Hip flexion/Innominate flexion 90 85   Hip extension/Innominate extension 5 5   Hip external rotation (ER) 20 15   Hip internal rotation (IR) 10 10   Hip abduction 40 40     Strength: Lumbar protective mechanism (LPM) are not tested today for initiation. LPM Strength */5   R anterior to posterior diagonal    L anterior to posterior diagonal    R posterior to anterior diagonal    L posterior to anterior diagonal      Manual Muscle Test (*/5) Right Left   Knee extension 4 4   Knee flexion 4+ 4+   Hip flexion 4 4-   Hip ER 4 4-   Hip IR 4 4-   Hip extension 3+ 3+   Hip abduction 4- 3+   Hip adduction 5 5   Ankle DF 5 5   Ankle PF 5 5   Core stability 3+/5     Special Tests:  Barnet Him test is negative. Scours test is negative  Neurological Screen:  Myotomes: Key muscle strength testing through bilateral LE is intact. Dermatomes: Sensation testing through bilateral lower quadrants for light touch is intact. Reflexes: Patellar (L4) and Achilles (S1) are not tested*. Neural tension tests: Passive straight leg raise (SLR) test is negative. Slump test is negative.    Functional Mobility:  Challenged with squatting, prolonged standing, balance and ambulation. Body Structures Involved:  1. Nerves  2. Bones  3. Joints  4. Muscles Body Functions Affected:  1. Sensory/Pain  2. Neuromusculoskeletal  3. Movement Related Activities and Participation Affected:  1. Mobility  2. Community, Social and [place]   Number of elements (examined above) that affect the Plan of Care: 3: MODERATE COMPLEXITY   CLINICAL PRESENTATION:   Presentation: Evolving clinical presentation with changing clinical characteristics: MODERATE COMPLEXITY   CLINICAL DECISION MAKING:   Outcome Measure: Tool Used: Lower Extremity Functional Scale (LEFS)  Score:  Initial: 52/80 Most Recent: X/80 (Date: -- )   Interpretation of Score: 20 questions each scored on a 5 point scale with 0 representing \"extreme difficulty or unable to perform\" and 4 representing \"no difficulty\". The lower the score, the greater the functional disability. 80/80 represents no disability. Minimal detectable change is 9 points. Score 80 79-63 62-48 47-32 31-16 15-1 0   Modifier CH CI CJ CK CL CM CN     ? Mobility - Walking and Moving Around:     - CURRENT STATUS: CJ - 20%-39% impaired, limited or restricted    - GOAL STATUS: CI - 1%-19% impaired, limited or restricted    - D/C STATUS:  ---------------To be determined---------------    Medical Necessity:   · Patient is expected to demonstrate progress in strength, range of motion, balance and functional technique to increase independence with ADLs, prolonged standing and walking techniques. .  Reason for Services/Other Comments:  · Patient continues to require skilled intervention due to challenged with endurance and pain levels in weight bearing positions.    Use of outcome tool(s) and clinical judgement create a POC that gives a: Questionable prediction of patient's progress: MODERATE COMPLEXITY            TREATMENT:   (In addition to Assessment/Re-Assessment sessions the following treatments were rendered)    Pre-treatment Symptoms/Complaints: Patient notes less pain in left hip and lumbar spine. Has notes tightness between shoulder blades this week. Needs to review exercises that were added last session  Pain: Initial:   Pain Intensity 1: 4  Pain Location 1: Hip, Spine, thoracic, Spine, lumbar  Pain Orientation 1: Left  Post Session:  2/10      Therapeutic Exercise: (15 Minutes):  Exercises per grid below to improve mobility, strength, balance and coordination. Required minimal visual and verbal cues to promote proper body alignment, promote proper body posture and promote proper body mechanics. Progressed resistance, range, repetitions and complexity of movement as indicated. Date:  3/22/2017   Activity/Exercise Parameters   Single knee to chest X 5 reps, 25 sec holds BLE   Supine hamstrings stretch X 5 reps, 20 sec holds, BLE   Supine lower trunk rotation X 10 reps BLE   Supine abdominal brace 90/90 X 10 reps, 5 sec holds, single leg                     Manual Therapy (    Soft Tissue Mobilization Duration  Duration: 45 Minutes): Manual techniques to facilitate improved motion and decreased pain. · Supine bilateral iliopsoas release with FMP of lower trunk rotation. · Prone soft tissue mobilization to bilateral thoracic and lumbar spine paraspinals, with strumming techniques and release around bony contours left greater than right of iliac       Crest, sacral sulcus and lower border of rib cage. · Prone bilateral hip on axis mobilization with manual resistance into hip IR. · Prone bilateral knee flexion/rectus femoris stretches, contract/relax techniques. (Used abbreviations: MET - muscle energy technique; PNF - proprioceptive neuromuscular facilitation; NMR - neuromuscular re-education; a/p - anterior to posterior; p/a - posterior to anterior, FMP - functional movement patterns)    Treatment/Session Assessment:    · Response to Treatment:  Decreasing soft tissue restrictions noted in lumbar spine and anterior pelvis.  Improved techniques with exercises with verbal cueing. · Compliance with Program/Exercises: compliant all of the time. · Recommendations/Intent for next treatment session: \"Next visit will focus on advancements to more challenging activities\".   Total Treatment Duration:  PT Patient Time In/Time Out  Time In: 0830  Time Out: [facility], PT

## 2017-03-28 ENCOUNTER — HOSPITAL ENCOUNTER (OUTPATIENT)
Dept: PHYSICAL THERAPY | Age: 82
Discharge: HOME | End: 2017-03-28
Payer: MEDICARE

## 2017-03-28 PROCEDURE — 97140 MANUAL THERAPY 1/> REGIONS: CPT

## 2017-03-28 PROCEDURE — 97110 THERAPEUTIC EXERCISES: CPT

## 2017-03-29 NOTE — PROGRESS NOTES
Mayte Clifford  : [facility] [address]  [phone]   [phone]        OUTPATIENT PHYSICAL THERAPY:Daily Note 3/28/2017    ICD-10: Treatment Diagnosis: low back pain M54.5  ICD-10: Treatment Diagnosis: stiffness in joint, right hip M25.651  ICD-10: Treatment Diagnosis: stiffness in joint, left hip M25.652  Precautions/Allergies:   Review of patient's allergies indicates no known allergies. Fall Risk Score: 2 (? 5 = High Risk)  MD Orders: evaluate and treat MEDICAL/REFERRING DIAGNOSIS:  Back pain [M54.9]   DATE OF ONSET: chronic  REFERRING PHYSICIAN: Irena Bule., *  RETURN PHYSICIAN APPOINTMENT: as needed     INITIAL ASSESSMENT:  Mr. Prescilla Landau is a 81 y.o. male who presents to physical therapy with chronic low back, bilateral hip (left greater than right) stiffness and mid-thoracic spine discomfort. He presents with soft tissue restrictions, arthrokinematic dysfunctions in thoracic/lumbar spine and pelvis, postural deficits, strength and endurance deficits. Patient would benefit from skilled physical therapy to improve overall mobility and function. PROBLEM LIST (Impacting functional limitations):  1. Decreased Strength  2. Decreased ADL/Functional Activities  3. Decreased Ambulation Ability/Technique  4. Decreased Balance  5. Increased Pain  6. Decreased Activity Tolerance  7. Decreased Flexibility/Joint Mobility INTERVENTIONS PLANNED:  1. Balance Exercise  2. Heat  3. Home Exercise Program (HEP)  4. Manual Therapy  5. Neuromuscular Re-education/Strengthening  6. Range of Motion (ROM)  7. Therapeutic Activites  8. Therapeutic Exercise/Strengthening   TREATMENT PLAN:  Effective Dates: 3/7/2017 TO 2017. Frequency/Duration: 2 times a week for 6 weeks, progress to 1x a week for 6 weeks.   GOALS: (Goals have been discussed and agreed upon with patient.)  Short-Term Functional Goals: Time Frame: 4 weeks  1. Patient demonstrates independence with his HEP without verbal cueing from therapist.  2. Patient able to ambulate for 15 minutes without onset of low back pain. 3. Patient able to stand for 20 minute to perform household/daily activities with pain no more than 0-2/10  Discharge Goals: Time Frame: 12 weeks  1. Improve LE and core stability strength to 5/5 to perform ADLs  2. Patient able to ambulate for 30 minutes without onset of low back pain. 3. Patient demonstrates ability to perform balance activities for 2 minutes without loss of balance. 4. Improve LEFS score from 52/80 to 62/80 to perform ADLs  Rehabilitation Potential For Stated Goals: Excellent  Regarding Shilpa Mendoza 's therapy, I certify that the treatment plan above will be carried out by a therapist or under their direction. Thank you for this referral,  Yvonne Guzman PT     Referring Physician Signature: Nancy Patel., *              Date                    The information in this section was collected on 3/7/2017 (except where otherwise noted). HISTORY:   History of Present Injury/Illness (Reason for Referral):  Chronic history of low back pain and mid-thoracic spine pain. Notes increased discomfort and tightness through low back, left greater than right and into left hip, especially with weight bearing and walking. Patient notes he has had both of his hips replaced and has had continued challenges with his left hip. He is an avid golfer, however has been limited secondary to his back pain. Patient denies dizziness, drop attacks, numbness/tingling, bowel/bladder dysfunction and/or unexplained weight gain/loss. Past Medical History/Comorbidities:   Mr. Mabel Boeck  has a past medical history of Abnormal cardiovascular function study (3/18/2016); Arthritis; Coronary atherosclerosis of native coronary vessel (3/18/2016); Coronary atherosclerosis of native coronary vessel (3/18/2016);  Dyspnea on exertion; HLD (hyperlipidemia) (3/18/2016); Hypertension; Seizures ([address] and Upper respiratory infection. Mr. Rich Astudillo  has a past surgical history that includes orthopaedic and other surgical. bilateral total hip replacements. Social History/Living Environment:     Lives at independent living facility [facility], with his wife. No stairs in home. Prior Level of Function/Work/Activity:  Independent with tasks, however challenged with endurance and performance of activities secondary to pain levels. Retired from business owner of fitness facilities. Works out 1-2x a week, attempts to walk 4-5x a week. Dominant Side:         LEFT  Other Clinical Tests:          X-rays- arthritic changes in lumbar spine and hips. Current Medications:       Current Outpatient Prescriptions:     atorvastatin (LIPITOR) 40 mg tablet, Take 1 Tab by mouth daily. , Disp: 30 Tab, Rfl: 5    fluticasone (FLONASE) 50 mcg/actuation nasal spray, , Disp: , Rfl:     donepezil (ARICEPT) 5 mg tablet, , Disp: , Rfl:     lisinopril-hydrochlorothiazide (PRINZIDE, ZESTORETIC) 20-12.5 mg per tablet, , Disp: , Rfl:     tamsulosin (FLOMAX) 0.4 mg capsule, , Disp: , Rfl:     traZODone (DESYREL) 100 mg tablet, , Disp: , Rfl:     atenolol (TENORMIN) 25 mg tablet, Take 1 Tab by mouth daily. , Disp: 90 Tab, Rfl: 3    rosuvastatin (CRESTOR) 20 mg tablet, take 20 mg by mouth daily. , Disp: , Rfl:     olmesartan-hydrochlorothiazide (BENICAR HCT) 40-12.5 mg per tablet, take 1 Tab by mouth two (2) times a day., Disp: , Rfl:     NAPROXEN SODIUM (ALEVE PO), take  by mouth daily as needed. , Disp: , Rfl:    Date Last Reviewed:  3/28/2017     Number of Personal Factors/Comorbidities that affect the Plan of Care: 0: LOW COMPLEXITY   EXAMINATION:   Observation/Orthostatic Postural Assessment:          Patient denies any LE pain, paresthesia or symptoms. Patient denies any increase of symptoms with cough, sneeze or valsalva.  Patient denies any saddle paresthesia or bowel/bladder deficits. Patient exhibits a decreased lumbar lordosis and increased thoracic kyphosis. Lower extremity weight bearing is symmetrical. Shoulder symmetry exhibits right elevated. Observation of gait indicates decreased pelvic mobility (patient is a hip walker versus an efficient trunk walker). Lower quadrant bony landmarks are right iliac crest elevated compared to left, level greater trochanters in standing. Supine left malleolus superior compared to right Leg swing for innominate mobility indicates significant bilateral limitations in extension. Vertical compression test (VCT) for alignment is 2. Elbow flexion test (EFT) for motor activation is 2. Soft tissue observation indicates restrictions through bilateral iliopsoas, rectus femoris left greater than right, lumbar and thoracic spine paraspinals, left greater than right piriformis and hip rotators. Palpation: Myofascial assessment indicates restriction through mid-thoracic to lumbar spine superficial fascia. Muscle length testing in modified Berry position exhibits restricted left greater than right. Hamstring flexibility tested supine with straight leg raise (SLR) is restricted bilaterally, left 50, right 55 degrees. Piriformis length is restricted left greater than right. Quadriceps flexibility tested prone with heel to buttock is restricted bilaterally. Ely test is positive for rectus femoris tightness. Gastrocnemius and soleus flexibility are restricted, mild left greater than right. Sacrotuberous ligament is not tested today. Sacrococcygeal junction exhibits not tested today. Body of coccyx is not tested today. ROM: Passive trunk rotation is 70% available to the R and 70% available to the L. Kinetic testing in standing including forward bend test is positive left. Marcher's test is positive lefy. Pelvic shear test is standing exhibits decreased excursion of bilateral shear with a hard end feel.  Single plane active movements through lumbar spine in standing exhibit limited in all motions: flexion to mid-shin, extension limited by 50%. Functional squat for LE clearance is challenged with posterior translation of hip/pelvis. Lumbar positional testing at transverse processes indicates FRS right L4-5 with limitations in extension, rotation and side bending left, moderate restrictions. Sacral positional testing indicates mild right on right forward sacral torsion. Seated forward flexion test is positive left. Prone knee active flexion test is positive left. Spring testing of lumbar spine exhibits decreased p/a springs through L2-5. Spring testing of sacrum exhibits decreased p/a spring of left sacral base. Spring testing of innominates exhibits decreased left innominate spring. AROM (PROM) Right Left   Hip flexion/Innominate flexion 90 85   Hip extension/Innominate extension 5 5   Hip external rotation (ER) 20 15   Hip internal rotation (IR) 10 10   Hip abduction 40 40     Strength: Lumbar protective mechanism (LPM) are not tested today for initiation. LPM Strength */5   R anterior to posterior diagonal    L anterior to posterior diagonal    R posterior to anterior diagonal    L posterior to anterior diagonal      Manual Muscle Test (*/5) Right Left   Knee extension 4 4   Knee flexion 4+ 4+   Hip flexion 4 4-   Hip ER 4 4-   Hip IR 4 4-   Hip extension 3+ 3+   Hip abduction 4- 3+   Hip adduction 5 5   Ankle DF 5 5   Ankle PF 5 5   Core stability 3+/5     Special Tests:  Val Prima test is negative. Scours test is negative  Neurological Screen:  Myotomes: Key muscle strength testing through bilateral LE is intact. Dermatomes: Sensation testing through bilateral lower quadrants for light touch is intact. Reflexes: Patellar (L4) and Achilles (S1) are not tested*. Neural tension tests: Passive straight leg raise (SLR) test is negative. Slump test is negative.    Functional Mobility:  Challenged with squatting, prolonged standing, balance and ambulation. Body Structures Involved:  1. Nerves  2. Bones  3. Joints  4. Muscles Body Functions Affected:  1. Sensory/Pain  2. Neuromusculoskeletal  3. Movement Related Activities and Participation Affected:  1. Mobility  2. Community, Social and [place]   Number of elements (examined above) that affect the Plan of Care: 3: MODERATE COMPLEXITY   CLINICAL PRESENTATION:   Presentation: Evolving clinical presentation with changing clinical characteristics: MODERATE COMPLEXITY   CLINICAL DECISION MAKING:   Outcome Measure: Tool Used: Lower Extremity Functional Scale (LEFS)  Score:  Initial: 52/80 Most Recent: X/80 (Date: -- )   Interpretation of Score: 20 questions each scored on a 5 point scale with 0 representing \"extreme difficulty or unable to perform\" and 4 representing \"no difficulty\". The lower the score, the greater the functional disability. 80/80 represents no disability. Minimal detectable change is 9 points. Score 80 79-63 62-48 47-32 31-16 15-1 0   Modifier CH CI CJ CK CL CM CN     ? Mobility - Walking and Moving Around:     - CURRENT STATUS: CJ - 20%-39% impaired, limited or restricted    - GOAL STATUS: CI - 1%-19% impaired, limited or restricted    - D/C STATUS:  ---------------To be determined---------------    Medical Necessity:   · Patient is expected to demonstrate progress in strength, range of motion, balance and functional technique to increase independence with ADLs, prolonged standing and walking techniques. .  Reason for Services/Other Comments:  · Patient continues to require skilled intervention due to challenged with endurance and pain levels in weight bearing positions.    Use of outcome tool(s) and clinical judgement create a POC that gives a: Questionable prediction of patient's progress: MODERATE COMPLEXITY            TREATMENT:   (In addition to Assessment/Re-Assessment sessions the following treatments were rendered)    Pre-treatment Symptoms/Complaints: Patient notes less pain in low back and left hip this week. Finally feels he is performing exercises correctly and consistently. Pain: Initial:   Pain Intensity 1: 3  Pain Location 1: Hip, Spine, lumbar  Pain Orientation 1: Left  Post Session:  2/10      Therapeutic Exercise: (20 Minutes):  Exercises per grid below to improve mobility, strength, balance and coordination. Required minimal visual and verbal cues to promote proper body alignment, promote proper body posture and promote proper body mechanics. Progressed resistance, range, repetitions and complexity of movement as indicated. Date:  3/28/2017   Activity/Exercise Parameters   Single knee to chest X 5 reps, 25 sec holds BLE   Supine hamstrings stretch X 5 reps, 20 sec holds, BLE   Supine lower trunk rotation X 10 reps BLE   Supine abdominal brace 90/90 X 10 reps, 5 sec holds, single leg   Supine hip adductor stretch X 5 reps, 15 sec holds. BLE                 Manual Therapy (    Soft Tissue Mobilization Duration  Duration: 40 Minutes): Manual techniques to facilitate improved motion and decreased pain. · Supine bilateral iliopsoas release with FMP of lower trunk rotation, supine left hip adductor soft tissue mobilization with FMP of knee extension. · Supine bilateral diaphragm release with FMP of lower trunk rotation. · Prone soft tissue mobilization to bilateral thoracic and lumbar spine paraspinals, with strumming techniques and release around bony contours left greater than right of iliac       Crest, sacral sulcus and lower border of rib cage. · Prone bilateral hip on axis mobilization with manual resistance into hip IR. · Prone bilateral knee flexion/rectus femoris stretches, contract/relax techniques.     (Used abbreviations: MET - muscle energy technique; PNF - proprioceptive neuromuscular facilitation; NMR - neuromuscular re-education; a/p - anterior to posterior; p/a - posterior to anterior, FMP - functional movement patterns)    Treatment/Session Assessment:    · Response to Treatment: improving overall mobility in lumbar spine and pelvis, restrictions noted in left hip adductor improving after treatment today. · Compliance with Program/Exercises: compliant all of the time. · Recommendations/Intent for next treatment session: \"Next visit will focus on advancements to more challenging activities\".   Total Treatment Duration:  PT Patient Time In/Time Out  Time In: 3620  Time Out: 5[address], PT

## 2017-03-30 ENCOUNTER — HOSPITAL ENCOUNTER (OUTPATIENT)
Dept: PHYSICAL THERAPY | Age: 82
Discharge: HOME | End: 2017-03-30
Payer: MEDICARE

## 2017-03-30 PROCEDURE — 97140 MANUAL THERAPY 1/> REGIONS: CPT

## 2017-03-30 PROCEDURE — 97110 THERAPEUTIC EXERCISES: CPT

## 2017-03-31 NOTE — PROGRESS NOTES
Kat Mccann  : [facility] [address]  [phone]   [phone]        OUTPATIENT PHYSICAL THERAPY:Daily Note 3/30/2017    ICD-10: Treatment Diagnosis: low back pain M54.5  ICD-10: Treatment Diagnosis: stiffness in joint, right hip M25.651  ICD-10: Treatment Diagnosis: stiffness in joint, left hip M25.652  Precautions/Allergies:   Review of patient's allergies indicates no known allergies. Fall Risk Score: 2 (? 5 = High Risk)  MD Orders: evaluate and treat MEDICAL/REFERRING DIAGNOSIS:  Back pain [M54.9]   DATE OF ONSET: chronic  REFERRING PHYSICIAN: Eben Williamson., *  RETURN PHYSICIAN APPOINTMENT: as needed     INITIAL ASSESSMENT:  Mr. Sowmya Bolanos is a 81 y.o. male who presents to physical therapy with chronic low back, bilateral hip (left greater than right) stiffness and mid-thoracic spine discomfort. He presents with soft tissue restrictions, arthrokinematic dysfunctions in thoracic/lumbar spine and pelvis, postural deficits, strength and endurance deficits. Patient would benefit from skilled physical therapy to improve overall mobility and function. PROBLEM LIST (Impacting functional limitations):  1. Decreased Strength  2. Decreased ADL/Functional Activities  3. Decreased Ambulation Ability/Technique  4. Decreased Balance  5. Increased Pain  6. Decreased Activity Tolerance  7. Decreased Flexibility/Joint Mobility INTERVENTIONS PLANNED:  1. Balance Exercise  2. Heat  3. Home Exercise Program (HEP)  4. Manual Therapy  5. Neuromuscular Re-education/Strengthening  6. Range of Motion (ROM)  7. Therapeutic Activites  8. Therapeutic Exercise/Strengthening   TREATMENT PLAN:  Effective Dates: 3/7/2017 TO 2017. Frequency/Duration: 2 times a week for 6 weeks, progress to 1x a week for 6 weeks.   GOALS: (Goals have been discussed and agreed upon with patient.)  Short-Term Functional Goals: Time Frame: 4 weeks  1. Patient demonstrates independence with his HEP without verbal cueing from therapist.  2. Patient able to ambulate for 15 minutes without onset of low back pain. 3. Patient able to stand for 20 minute to perform household/daily activities with pain no more than 0-2/10  Discharge Goals: Time Frame: 12 weeks  1. Improve LE and core stability strength to 5/5 to perform ADLs  2. Patient able to ambulate for 30 minutes without onset of low back pain. 3. Patient demonstrates ability to perform balance activities for 2 minutes without loss of balance. 4. Improve LEFS score from 52/80 to 62/80 to perform ADLs  Rehabilitation Potential For Stated Goals: Excellent  Regarding Teena Marks 's therapy, I certify that the treatment plan above will be carried out by a therapist or under their direction. Thank you for this referral,  Jorge Maya, PT     Referring Physician Signature: Louis Arce., *              Date                    The information in this section was collected on 3/7/2017 (except where otherwise noted). HISTORY:   History of Present Injury/Illness (Reason for Referral):  Chronic history of low back pain and mid-thoracic spine pain. Notes increased discomfort and tightness through low back, left greater than right and into left hip, especially with weight bearing and walking. Patient notes he has had both of his hips replaced and has had continued challenges with his left hip. He is an avid golfer, however has been limited secondary to his back pain. Patient denies dizziness, drop attacks, numbness/tingling, bowel/bladder dysfunction and/or unexplained weight gain/loss. Past Medical History/Comorbidities:   Mr. Pedro Pablo Mueller  has a past medical history of Abnormal cardiovascular function study (3/18/2016); Arthritis; Coronary atherosclerosis of native coronary vessel (3/18/2016); Coronary atherosclerosis of native coronary vessel (3/18/2016);  Dyspnea on exertion; HLD (hyperlipidemia) (3/18/2016); Hypertension; Seizures ([address] and Upper respiratory infection. Mr. Sam Mobley  has a past surgical history that includes orthopaedic and other surgical. bilateral total hip replacements. Social History/Living Environment:     Lives at independent living facility [facility], with his wife. No stairs in home. Prior Level of Function/Work/Activity:  Independent with tasks, however challenged with endurance and performance of activities secondary to pain levels. 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 Works out 1-2x a week, attempts to walk 4-5x a week. Dominant Side:         LEFT  Other Clinical Tests:          X-rays- arthritic changes in lumbar spine and hips. Current Medications:       Current Outpatient Prescriptions:     atorvastatin (LIPITOR) 40 mg tablet, Take 1 Tab by mouth daily. , Disp: 30 Tab, Rfl: 5    fluticasone (FLONASE) 50 mcg/actuation nasal spray, , Disp: , Rfl:     donepezil (ARICEPT) 5 mg tablet, , Disp: , Rfl:     lisinopril-hydrochlorothiazide (PRINZIDE, ZESTORETIC) 20-12.5 mg per tablet, , Disp: , Rfl:     tamsulosin (FLOMAX) 0.4 mg capsule, , Disp: , Rfl:     traZODone (DESYREL) 100 mg tablet, , Disp: , Rfl:     atenolol (TENORMIN) 25 mg tablet, Take 1 Tab by mouth daily. , Disp: 90 Tab, Rfl: 3    rosuvastatin (CRESTOR) 20 mg tablet, take 20 mg by mouth daily. , Disp: , Rfl:     olmesartan-hydrochlorothiazide (BENICAR HCT) 40-12.5 mg per tablet, take 1 Tab by mouth two (2) times a day., Disp: , Rfl:     NAPROXEN SODIUM (ALEVE PO), take  by mouth daily as needed. , Disp: , Rfl:    Date Last Reviewed:  3/30/2017     Number of Personal Factors/Comorbidities that affect the Plan of Care: 0: LOW COMPLEXITY   EXAMINATION:   Observation/Orthostatic Postural Assessment:          Patient denies any LE pain, paresthesia or symptoms. Patient denies any increase of symptoms with cough, sneeze or valsalva.  Patient denies any saddle paresthesia or bowel/bladder deficits. Patient exhibits a decreased lumbar lordosis and increased thoracic kyphosis. Lower extremity weight bearing is symmetrical. Shoulder symmetry exhibits right elevated. Observation of gait indicates decreased pelvic mobility (patient is a hip walker versus an efficient trunk walker). Lower quadrant bony landmarks are right iliac crest elevated compared to left, level greater trochanters in standing. Supine left malleolus superior compared to right Leg swing for innominate mobility indicates significant bilateral limitations in extension. Vertical compression test (VCT) for alignment is 2. Elbow flexion test (EFT) for motor activation is 2. Soft tissue observation indicates restrictions through bilateral iliopsoas, rectus femoris left greater than right, lumbar and thoracic spine paraspinals, left greater than right piriformis and hip rotators. Palpation: Myofascial assessment indicates restriction through mid-thoracic to lumbar spine superficial fascia. Muscle length testing in modified Berry position exhibits restricted left greater than right. Hamstring flexibility tested supine with straight leg raise (SLR) is restricted bilaterally, left 50, right 55 degrees. Piriformis length is restricted left greater than right. Quadriceps flexibility tested prone with heel to buttock is restricted bilaterally. Ely test is positive for rectus femoris tightness. Gastrocnemius and soleus flexibility are restricted, mild left greater than right. Sacrotuberous ligament is not tested today. Sacrococcygeal junction exhibits not tested today. Body of coccyx is not tested today. ROM: Passive trunk rotation is 70% available to the R and 70% available to the L. Kinetic testing in standing including forward bend test is positive left. Marcher's test is positive lefy. Pelvic shear test is standing exhibits decreased excursion of bilateral shear with a hard end feel.  Single plane active movements through lumbar spine in standing exhibit limited in all motions: flexion to mid-shin, extension limited by 50%. Functional squat for LE clearance is challenged with posterior translation of hip/pelvis. Lumbar positional testing at transverse processes indicates FRS right L4-5 with limitations in extension, rotation and side bending left, moderate restrictions. Sacral positional testing indicates mild right on right forward sacral torsion. Seated forward flexion test is positive left. Prone knee active flexion test is positive left. Spring testing of lumbar spine exhibits decreased p/a springs through L2-5. Spring testing of sacrum exhibits decreased p/a spring of left sacral base. Spring testing of innominates exhibits decreased left innominate spring. AROM (PROM) Right Left   Hip flexion/Innominate flexion 90 85   Hip extension/Innominate extension 5 5   Hip external rotation (ER) 20 15   Hip internal rotation (IR) 10 10   Hip abduction 40 40     Strength: Lumbar protective mechanism (LPM) are not tested today for initiation. LPM Strength */5   R anterior to posterior diagonal    L anterior to posterior diagonal    R posterior to anterior diagonal    L posterior to anterior diagonal      Manual Muscle Test (*/5) Right Left   Knee extension 4 4   Knee flexion 4+ 4+   Hip flexion 4 4-   Hip ER 4 4-   Hip IR 4 4-   Hip extension 3+ 3+   Hip abduction 4- 3+   Hip adduction 5 5   Ankle DF 5 5   Ankle PF 5 5   Core stability 3+/5     Special Tests:  Starlene Si test is negative. Scours test is negative  Neurological Screen:  Myotomes: Key muscle strength testing through bilateral LE is intact. Dermatomes: Sensation testing through bilateral lower quadrants for light touch is intact. Reflexes: Patellar (L4) and Achilles (S1) are not tested*. Neural tension tests: Passive straight leg raise (SLR) test is negative. Slump test is negative.    Functional Mobility:  Challenged with squatting, prolonged standing, balance and ambulation. Body Structures Involved:  1. Nerves  2. Bones  3. Joints  4. Muscles Body Functions Affected:  1. Sensory/Pain  2. Neuromusculoskeletal  3. Movement Related Activities and Participation Affected:  1. Mobility  2. Community, Social and [place]   Number of elements (examined above) that affect the Plan of Care: 3: MODERATE COMPLEXITY   CLINICAL PRESENTATION:   Presentation: Evolving clinical presentation with changing clinical characteristics: MODERATE COMPLEXITY   CLINICAL DECISION MAKING:   Outcome Measure: Tool Used: Lower Extremity Functional Scale (LEFS)  Score:  Initial: 52/80 Most Recent: X/80 (Date: -- )   Interpretation of Score: 20 questions each scored on a 5 point scale with 0 representing \"extreme difficulty or unable to perform\" and 4 representing \"no difficulty\". The lower the score, the greater the functional disability. 80/80 represents no disability. Minimal detectable change is 9 points. Score 80 79-63 62-48 47-32 31-16 15-1 0   Modifier CH CI CJ CK CL CM CN     ? Mobility - Walking and Moving Around:     - CURRENT STATUS: CJ - 20%-39% impaired, limited or restricted    - GOAL STATUS: CI - 1%-19% impaired, limited or restricted    - D/C STATUS:  ---------------To be determined---------------    Medical Necessity:   · Patient is expected to demonstrate progress in strength, range of motion, balance and functional technique to increase independence with ADLs, prolonged standing and walking techniques. .  Reason for Services/Other Comments:  · Patient continues to require skilled intervention due to challenged with endurance and pain levels in weight bearing positions.    Use of outcome tool(s) and clinical judgement create a POC that gives a: Questionable prediction of patient's progress: MODERATE COMPLEXITY            TREATMENT:   (In addition to Assessment/Re-Assessment sessions the following treatments were rendered)    Pre-treatment Symptoms/Complaints: Patient notes less discomfort in low back today, notes some tightness in mid-back. Pain: Initial:   Pain Intensity 1: 3  Pain Location 1: Spine, thoracic, Spine, lumbar  Pain Orientation 1: Left, Posterior  Post Session:  2/10      Therapeutic Exercise: (15 Minutes):  Exercises per grid below to improve mobility, strength, balance and coordination. Required minimal visual and verbal cues to promote proper body alignment, promote proper body posture and promote proper body mechanics. Progressed resistance, range, repetitions and complexity of movement as indicated. Date:  3/30/2017   Activity/Exercise Parameters   Single knee to chest X 5 reps, 25 sec holds BLE   Supine hamstrings stretch X 5 reps, 20 sec holds, BLE   Supine lower trunk rotation X 10 reps BLE   Supine abdominal brace 90/90 X 10 reps, 5 sec holds, single leg   Supine hip adductor stretch X 5 reps, 15 sec holds. BLE                 Manual Therapy (    Soft Tissue Mobilization Duration  Duration: 40 Minutes): Manual techniques to facilitate improved motion and decreased pain. · Supine bilateral iliopsoas release with FMP of lower trunk rotation, supine left hip adductor soft tissue mobilization with FMP of knee extension. · Supine bilateral diaphragm release with FMP of lower trunk rotation. · Prone soft tissue mobilization to bilateral thoracic and lumbar spine paraspinals, with strumming techniques and release around bony contours left greater than right of iliac       Crest, sacral sulcus and lower border of rib cage. · Prone bilateral hip on axis mobilization with manual resistance into hip IR. · Prone bilateral knee flexion/rectus femoris stretches, contract/relax techniques.       (Used abbreviations: MET - muscle energy technique; PNF - proprioceptive neuromuscular facilitation; NMR - neuromuscular re-education; a/p - anterior to posterior; p/a - posterior to anterior, FMP - functional movement patterns)    Treatment/Session Assessment:    · Response to Treatment: decreasing soft tissue restrictions noted in pelvis and lumbar spine. Improving endurance with exercises. · Compliance with Program/Exercises: compliant all of the time. · Recommendations/Intent for next treatment session: \"Next visit will focus on advancements to more challenging activities\".   Total Treatment Duration:  PT Patient Time In/Time Out  Time In: 4585  Time Out: [address] Louis Burger, VICTOR MANUEL

## 2017-04-03 ENCOUNTER — HOSPITAL ENCOUNTER (OUTPATIENT)
Dept: PHYSICAL THERAPY | Age: 82
Discharge: HOME | End: 2017-04-03
Payer: MEDICARE

## 2017-04-03 PROCEDURE — 97140 MANUAL THERAPY 1/> REGIONS: CPT

## 2017-04-03 PROCEDURE — 97110 THERAPEUTIC EXERCISES: CPT

## 2017-04-03 NOTE — PROGRESS NOTES
Olivia Morning  : [facility] [address]  [phone]   [phone]        OUTPATIENT PHYSICAL THERAPY:Daily Note 4/3/2017    ICD-10: Treatment Diagnosis: low back pain M54.5  ICD-10: Treatment Diagnosis: stiffness in joint, right hip M25.651  ICD-10: Treatment Diagnosis: stiffness in joint, left hip M25.652  Precautions/Allergies:   Review of patient's allergies indicates no known allergies. Fall Risk Score: 2 (? 5 = High Risk)  MD Orders: evaluate and treat MEDICAL/REFERRING DIAGNOSIS:  Back pain [M54.9]   DATE OF ONSET: chronic  REFERRING PHYSICIAN: Derick Rodriguez, *  RETURN PHYSICIAN APPOINTMENT: as needed     INITIAL ASSESSMENT:  Mr. Fabienne Davenport is a 81 y.o. male who presents to physical therapy with chronic low back, bilateral hip (left greater than right) stiffness and mid-thoracic spine discomfort. He presents with soft tissue restrictions, arthrokinematic dysfunctions in thoracic/lumbar spine and pelvis, postural deficits, strength and endurance deficits. Patient would benefit from skilled physical therapy to improve overall mobility and function. PROBLEM LIST (Impacting functional limitations):  1. Decreased Strength  2. Decreased ADL/Functional Activities  3. Decreased Ambulation Ability/Technique  4. Decreased Balance  5. Increased Pain  6. Decreased Activity Tolerance  7. Decreased Flexibility/Joint Mobility INTERVENTIONS PLANNED:  1. Balance Exercise  2. Heat  3. Home Exercise Program (HEP)  4. Manual Therapy  5. Neuromuscular Re-education/Strengthening  6. Range of Motion (ROM)  7. Therapeutic Activites  8. Therapeutic Exercise/Strengthening   TREATMENT PLAN:  Effective Dates: 3/7/2017 TO 2017. Frequency/Duration: 2 times a week for 6 weeks, progress to 1x a week for 6 weeks.   GOALS: (Goals have been discussed and agreed upon with patient.)  Short-Term Functional Goals: Time Frame: 4 weeks  1. Patient demonstrates independence with his HEP without verbal cueing from therapist.  2. Patient able to ambulate for 15 minutes without onset of low back pain. 3. Patient able to stand for 20 minute to perform household/daily activities with pain no more than 0-2/10  Discharge Goals: Time Frame: 12 weeks  1. Improve LE and core stability strength to 5/5 to perform ADLs  2. Patient able to ambulate for 30 minutes without onset of low back pain. 3. Patient demonstrates ability to perform balance activities for 2 minutes without loss of balance. 4. Improve LEFS score from 52/80 to 62/80 to perform ADLs  Rehabilitation Potential For Stated Goals: Excellent  Regarding Alcira Go's therapy, I certify that the treatment plan above will be carried out by a therapist or under their direction. Thank you for this referral,  Sue Kellye PT     Referring Physician Signature: Zach Roque., *              Date                    The information in this section was collected on 3/7/2017 (except where otherwise noted). HISTORY:   History of Present Injury/Illness (Reason for Referral):  Chronic history of low back pain and mid-thoracic spine pain. Notes increased discomfort and tightness through low back, left greater than right and into left hip, especially with weight bearing and walking. Patient notes he has had both of his hips replaced and has had continued challenges with his left hip. He is an avid golfer, however has been limited secondary to his back pain. Patient denies dizziness, drop attacks, numbness/tingling, bowel/bladder dysfunction and/or unexplained weight gain/loss. Past Medical History/Comorbidities:   Mr. Shu Meza  has a past medical history of Abnormal cardiovascular function study (3/18/2016); Arthritis; Coronary atherosclerosis of native coronary vessel (3/18/2016); Coronary atherosclerosis of native coronary vessel (3/18/2016);  Dyspnea on exertion; HLD (hyperlipidemia) (3/18/2016); Hypertension; Seizures ([address] and Upper respiratory infection. Mr. June Lesch  has a past surgical history that includes orthopaedic and other surgical. bilateral total hip replacements. Social History/Living Environment:     Lives at independent living facility [facility], with his wife. No stairs in home. Prior Level of Function/Work/Activity:  Independent with tasks, however challenged with endurance and performance of activities secondary to pain levels. 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 Works out 1-2x a week, attempts to walk 4-5x a week. Dominant Side:         LEFT  Other Clinical Tests:          X-rays- arthritic changes in lumbar spine and hips. Current Medications:       Current Outpatient Prescriptions:     atorvastatin (LIPITOR) 40 mg tablet, Take 1 Tab by mouth daily. , Disp: 30 Tab, Rfl: 5    fluticasone (FLONASE) 50 mcg/actuation nasal spray, , Disp: , Rfl:     donepezil (ARICEPT) 5 mg tablet, , Disp: , Rfl:     lisinopril-hydrochlorothiazide (PRINZIDE, ZESTORETIC) 20-12.5 mg per tablet, , Disp: , Rfl:     tamsulosin (FLOMAX) 0.4 mg capsule, , Disp: , Rfl:     traZODone (DESYREL) 100 mg tablet, , Disp: , Rfl:     atenolol (TENORMIN) 25 mg tablet, Take 1 Tab by mouth daily. , Disp: 90 Tab, Rfl: 3    rosuvastatin (CRESTOR) 20 mg tablet, take 20 mg by mouth daily. , Disp: , Rfl:     olmesartan-hydrochlorothiazide (BENICAR HCT) 40-12.5 mg per tablet, take 1 Tab by mouth two (2) times a day., Disp: , Rfl:     NAPROXEN SODIUM (ALEVE PO), take  by mouth daily as needed. , Disp: , Rfl:    Date Last Reviewed:  4/3/2017     Number of Personal Factors/Comorbidities that affect the Plan of Care: 0: LOW COMPLEXITY   EXAMINATION:   Observation/Orthostatic Postural Assessment:          Patient denies any LE pain, paresthesia or symptoms. Patient denies any increase of symptoms with cough, sneeze or valsalva.  Patient denies any saddle paresthesia or bowel/bladder deficits. Patient exhibits a decreased lumbar lordosis and increased thoracic kyphosis. Lower extremity weight bearing is symmetrical. Shoulder symmetry exhibits right elevated. Observation of gait indicates decreased pelvic mobility (patient is a hip walker versus an efficient trunk walker). Lower quadrant bony landmarks are right iliac crest elevated compared to left, level greater trochanters in standing. Supine left malleolus superior compared to right Leg swing for innominate mobility indicates significant bilateral limitations in extension. Vertical compression test (VCT) for alignment is 2. Elbow flexion test (EFT) for motor activation is 2. Soft tissue observation indicates restrictions through bilateral iliopsoas, rectus femoris left greater than right, lumbar and thoracic spine paraspinals, left greater than right piriformis and hip rotators. Palpation: Myofascial assessment indicates restriction through mid-thoracic to lumbar spine superficial fascia. Muscle length testing in modified Ebrry position exhibits restricted left greater than right. Hamstring flexibility tested supine with straight leg raise (SLR) is restricted bilaterally, left 50, right 55 degrees. Piriformis length is restricted left greater than right. Quadriceps flexibility tested prone with heel to buttock is restricted bilaterally. Ely test is positive for rectus femoris tightness. Gastrocnemius and soleus flexibility are restricted, mild left greater than right. Sacrotuberous ligament is not tested today. Sacrococcygeal junction exhibits not tested today. Body of coccyx is not tested today. ROM: Passive trunk rotation is 70% available to the R and 70% available to the L. Kinetic testing in standing including forward bend test is positive left. Marcher's test is positive lefy. Pelvic shear test is standing exhibits decreased excursion of bilateral shear with a hard end feel.  Single plane active movements through lumbar spine in standing exhibit limited in all motions: flexion to mid-shin, extension limited by 50%. Functional squat for LE clearance is challenged with posterior translation of hip/pelvis. Lumbar positional testing at transverse processes indicates FRS right L4-5 with limitations in extension, rotation and side bending left, moderate restrictions. Sacral positional testing indicates mild right on right forward sacral torsion. Seated forward flexion test is positive left. Prone knee active flexion test is positive left. Spring testing of lumbar spine exhibits decreased p/a springs through L2-5. Spring testing of sacrum exhibits decreased p/a spring of left sacral base. Spring testing of innominates exhibits decreased left innominate spring. AROM (PROM) Right Left   Hip flexion/Innominate flexion 90 85   Hip extension/Innominate extension 5 5   Hip external rotation (ER) 20 15   Hip internal rotation (IR) 10 10   Hip abduction 40 40     Strength: Lumbar protective mechanism (LPM) are not tested today for initiation. LPM Strength */5   R anterior to posterior diagonal    L anterior to posterior diagonal    R posterior to anterior diagonal    L posterior to anterior diagonal      Manual Muscle Test (*/5) Right Left   Knee extension 4 4   Knee flexion 4+ 4+   Hip flexion 4 4-   Hip ER 4 4-   Hip IR 4 4-   Hip extension 3+ 3+   Hip abduction 4- 3+   Hip adduction 5 5   Ankle DF 5 5   Ankle PF 5 5   Core stability 3+/5     Special Tests:  Pearlean Clan test is negative. Scours test is negative  Neurological Screen:  Myotomes: Key muscle strength testing through bilateral LE is intact. Dermatomes: Sensation testing through bilateral lower quadrants for light touch is intact. Reflexes: Patellar (L4) and Achilles (S1) are not tested*. Neural tension tests: Passive straight leg raise (SLR) test is negative. Slump test is negative.    Functional Mobility:  Challenged with squatting, prolonged standing, balance and ambulation. Body Structures Involved:  1. Nerves  2. Bones  3. Joints  4. Muscles Body Functions Affected:  1. Sensory/Pain  2. Neuromusculoskeletal  3. Movement Related Activities and Participation Affected:  1. Mobility  2. Community, Social and [place]   Number of elements (examined above) that affect the Plan of Care: 3: MODERATE COMPLEXITY   CLINICAL PRESENTATION:   Presentation: Evolving clinical presentation with changing clinical characteristics: MODERATE COMPLEXITY   CLINICAL DECISION MAKING:   Outcome Measure: Tool Used: Lower Extremity Functional Scale (LEFS)  Score:  Initial: 52/80 Most Recent: X/80 (Date: -- )   Interpretation of Score: 20 questions each scored on a 5 point scale with 0 representing \"extreme difficulty or unable to perform\" and 4 representing \"no difficulty\". The lower the score, the greater the functional disability. 80/80 represents no disability. Minimal detectable change is 9 points. Score 80 79-63 62-48 47-32 31-16 15-1 0   Modifier CH CI CJ CK CL CM CN     ? Mobility - Walking and Moving Around:     - CURRENT STATUS: CJ - 20%-39% impaired, limited or restricted    - GOAL STATUS: CI - 1%-19% impaired, limited or restricted    - D/C STATUS:  ---------------To be determined---------------    Medical Necessity:   · Patient is expected to demonstrate progress in strength, range of motion, balance and functional technique to increase independence with ADLs, prolonged standing and walking techniques. .  Reason for Services/Other Comments:  · Patient continues to require skilled intervention due to challenged with endurance and pain levels in weight bearing positions.    Use of outcome tool(s) and clinical judgement create a POC that gives a: Questionable prediction of patient's progress: MODERATE COMPLEXITY            TREATMENT:   (In addition to Assessment/Re-Assessment sessions the following treatments were rendered)    Pre-treatment Symptoms/Complaints: Patient notes less discomfort in low back today, notes some tightness in mid-back. Pain: Initial:   Pain Intensity 1: 2  Pain Location 1: Spine, thoracic, Spine, lumbar  Pain Orientation 1: Left, Posterior  Post Session:  1/10      Therapeutic Exercise: (15 Minutes):  Exercises per grid below to improve mobility, strength, balance and coordination. Required minimal visual and verbal cues to promote proper body alignment, promote proper body posture and promote proper body mechanics. Progressed resistance, range, repetitions and complexity of movement as indicated. Date:  4/3/2017   Activity/Exercise Parameters   Single knee to chest    Supine hamstrings stretch X 5 reps, 20 sec holds, BLE   Supine lower trunk rotation X 10 reps BLE   Supine abdominal brace 90/90 X 10 reps, 5 sec holds, single leg   Supine hip adductor stretch X 5 reps, 15 sec holds. BLE   Golf stretches X 5 minute review of set up, back swing with transition between LE weight bearing             Manual Therapy (    Soft Tissue Mobilization Duration  Duration: 45 Minutes): Manual techniques to facilitate improved motion and decreased pain. · Supine bilateral iliopsoas release with FMP of lower trunk rotation, supine left hip adductor soft tissue mobilization with FMP of knee extension. · Supine bilateral diaphragm release with FMP of lower trunk rotation. · Prone soft tissue mobilization to bilateral thoracic and lumbar spine paraspinals, with strumming techniques and release around bony contours left greater than right of iliac       Crest, sacral sulcus and lower border of rib cage. · Prone bilateral hip on axis mobilization with manual resistance into hip IR. · Prone bilateral knee flexion/rectus femoris stretches, contract/relax techniques.       (Used abbreviations: MET - muscle energy technique; PNF - proprioceptive neuromuscular facilitation; NMR - neuromuscular re-education; a/p - anterior to posterior; p/a - posterior to anterior, FMP - functional movement patterns)    Treatment/Session Assessment:    · Response to Treatment: decreased soft tissue restrictions noted in lumbar spine and pelvis, improving rotation with functional tasks. Challenged with balance with weight bearing assessment of rotation. · Compliance with Program/Exercises: compliant all of the time. · Recommendations/Intent for next treatment session: \"Next visit will focus on advancements to more challenging activities\".   Total Treatment Duration:  PT Patient Time In/Time Out  Time In: 1030  Time Out: 5500 Kesha Connelly, PT

## 2017-04-11 ENCOUNTER — APPOINTMENT (OUTPATIENT)
Dept: PHYSICAL THERAPY | Age: 82
End: 2017-04-11
Payer: MEDICARE

## 2017-04-13 ENCOUNTER — HOSPITAL ENCOUNTER (OUTPATIENT)
Dept: PHYSICAL THERAPY | Age: 82
Discharge: HOME | End: 2017-04-13
Payer: MEDICARE

## 2017-04-13 PROCEDURE — 97110 THERAPEUTIC EXERCISES: CPT

## 2017-04-13 PROCEDURE — 97140 MANUAL THERAPY 1/> REGIONS: CPT

## 2017-04-13 NOTE — PROGRESS NOTES
Sravanthi Feliciano  : [facility] at [address] [place] [address]  [phone]   [phone]        OUTPATIENT PHYSICAL THERAPY:Daily Note 2017    ICD-10: Treatment Diagnosis: low back pain M54.5  ICD-10: Treatment Diagnosis: stiffness in joint, right hip M25.651  ICD-10: Treatment Diagnosis: stiffness in joint, left hip M25.652  Precautions/Allergies:   Review of patient's allergies indicates no known allergies. Fall Risk Score: 2 (? 5 = High Risk)  MD Orders: evaluate and treat MEDICAL/REFERRING DIAGNOSIS:  Back pain [M54.9]   DATE OF ONSET: chronic  REFERRING PHYSICIAN: Nancy Abbott, *  RETURN PHYSICIAN APPOINTMENT: as needed     INITIAL ASSESSMENT:  Mr. Mabel Boeck is a 81 y.o. male who presents to physical therapy with chronic low back, bilateral hip (left greater than right) stiffness and mid-thoracic spine discomfort. He presents with soft tissue restrictions, arthrokinematic dysfunctions in thoracic/lumbar spine and pelvis, postural deficits, strength and endurance deficits. Patient would benefit from skilled physical therapy to improve overall mobility and function. PROBLEM LIST (Impacting functional limitations):  1. Decreased Strength  2. Decreased ADL/Functional Activities  3. Decreased Ambulation Ability/Technique  4. Decreased Balance  5. Increased Pain  6. Decreased Activity Tolerance  7. Decreased Flexibility/Joint Mobility INTERVENTIONS PLANNED:  1. Balance Exercise  2. Heat  3. Home Exercise Program (HEP)  4. Manual Therapy  5. Neuromuscular Re-education/Strengthening  6. Range of Motion (ROM)  7. Therapeutic Activites  8. Therapeutic Exercise/Strengthening   TREATMENT PLAN:  Effective Dates: 3/7/2017 TO 2017. Frequency/Duration: 2 times a week for 6 weeks, progress to 1x a week for 6 weeks.   GOALS: (Goals have been discussed and agreed upon with patient.)  Short-Term Functional Goals: Time Frame: 4 weeks  1. Patient demonstrates independence with his HEP without verbal cueing from therapist.  2. Patient able to ambulate for 15 minutes without onset of low back pain. 3. Patient able to stand for 20 minute to perform household/daily activities with pain no more than 0-2/10  Discharge Goals: Time Frame: 12 weeks  1. Improve LE and core stability strength to 5/5 to perform ADLs  2. Patient able to ambulate for 30 minutes without onset of low back pain. 3. Patient demonstrates ability to perform balance activities for 2 minutes without loss of balance. 4. Improve LEFS score from 52/80 to 62/80 to perform ADLs  Rehabilitation Potential For Stated Goals: Excellent  Regarding Karely Go's therapy, I certify that the treatment plan above will be carried out by a therapist or under their direction. Thank you for this referral,  Ant Rodríguez PT     Referring Physician Signature: Samson Campos, *              Date                    The information in this section was collected on 3/7/2017 (except where otherwise noted). HISTORY:   History of Present Injury/Illness (Reason for Referral):  Chronic history of low back pain and mid-thoracic spine pain. Notes increased discomfort and tightness through low back, left greater than right and into left hip, especially with weight bearing and walking. Patient notes he has had both of his hips replaced and has had continued challenges with his left hip. He is an avid golfer, however has been limited secondary to his back pain. Patient denies dizziness, drop attacks, numbness/tingling, bowel/bladder dysfunction and/or unexplained weight gain/loss. Past Medical History/Comorbidities:   Mr. Evelin Olmos  has a past medical history of Abnormal cardiovascular function study (3/18/2016); Arthritis; Coronary atherosclerosis of native coronary vessel (3/18/2016); Coronary atherosclerosis of native coronary vessel (3/18/2016);  Dyspnea on exertion; HLD (hyperlipidemia) (3/18/2016); Hypertension; Seizures ([address] and Upper respiratory infection. Mr. Stone  has a past surgical history that includes orthopaedic and other surgical. bilateral total hip replacements. Social History/Living Environment:     Lives at independent living facility [facility], with his wife. No stairs in home. Prior Level of Function/Work/Activity:  Independent with tasks, however challenged with endurance and performance of activities secondary to pain levels. 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 Works out 1-2x a week, attempts to walk 4-5x a week. Dominant Side:         LEFT  Other Clinical Tests:          X-rays- arthritic changes in lumbar spine and hips. Current Medications:       Current Outpatient Prescriptions:     atenolol (TENORMIN) 25 mg tablet, Take 1 Tab by mouth daily. , Disp: 90 Tab, Rfl: 3    atorvastatin (LIPITOR) 40 mg tablet, Take 1 Tab by mouth daily. , Disp: 30 Tab, Rfl: 5    fluticasone (FLONASE) 50 mcg/actuation nasal spray, , Disp: , Rfl:     donepezil (ARICEPT) 5 mg tablet, , Disp: , Rfl:     lisinopril-hydrochlorothiazide (PRINZIDE, ZESTORETIC) 20-12.5 mg per tablet, , Disp: , Rfl:     tamsulosin (FLOMAX) 0.4 mg capsule, , Disp: , Rfl:     traZODone (DESYREL) 100 mg tablet, , Disp: , Rfl:     rosuvastatin (CRESTOR) 20 mg tablet, take 20 mg by mouth daily. , Disp: , Rfl:     olmesartan-hydrochlorothiazide (BENICAR HCT) 40-12.5 mg per tablet, take 1 Tab by mouth two (2) times a day., Disp: , Rfl:     NAPROXEN SODIUM (ALEVE PO), take  by mouth daily as needed. , Disp: , Rfl:    Date Last Reviewed:  4/13/2017     Number of Personal Factors/Comorbidities that affect the Plan of Care: 0: LOW COMPLEXITY   EXAMINATION:   Observation/Orthostatic Postural Assessment:          Patient denies any LE pain, paresthesia or symptoms. Patient denies any increase of symptoms with cough, sneeze or valsalva.  Patient denies any saddle paresthesia or bowel/bladder deficits. Patient exhibits a decreased lumbar lordosis and increased thoracic kyphosis. Lower extremity weight bearing is symmetrical. Shoulder symmetry exhibits right elevated. Observation of gait indicates decreased pelvic mobility (patient is a hip walker versus an efficient trunk walker). Lower quadrant bony landmarks are right iliac crest elevated compared to left, level greater trochanters in standing. Supine left malleolus superior compared to right Leg swing for innominate mobility indicates significant bilateral limitations in extension. Vertical compression test (VCT) for alignment is 2. Elbow flexion test (EFT) for motor activation is 2. Soft tissue observation indicates restrictions through bilateral iliopsoas, rectus femoris left greater than right, lumbar and thoracic spine paraspinals, left greater than right piriformis and hip rotators. Palpation: Myofascial assessment indicates restriction through mid-thoracic to lumbar spine superficial fascia. Muscle length testing in modified Berry position exhibits restricted left greater than right. Hamstring flexibility tested supine with straight leg raise (SLR) is restricted bilaterally, left 50, right 55 degrees. Piriformis length is restricted left greater than right. Quadriceps flexibility tested prone with heel to buttock is restricted bilaterally. Ely test is positive for rectus femoris tightness. Gastrocnemius and soleus flexibility are restricted, mild left greater than right. Sacrotuberous ligament is not tested today. Sacrococcygeal junction exhibits not tested today. Body of coccyx is not tested today. ROM: Passive trunk rotation is 70% available to the R and 70% available to the L. Kinetic testing in standing including forward bend test is positive left. Marcher's test is positive lefy. Pelvic shear test is standing exhibits decreased excursion of bilateral shear with a hard end feel.  Single plane active movements through lumbar spine in standing exhibit limited in all motions: flexion to mid-shin, extension limited by 50%. Functional squat for LE clearance is challenged with posterior translation of hip/pelvis. Lumbar positional testing at transverse processes indicates FRS right L4-5 with limitations in extension, rotation and side bending left, moderate restrictions. Sacral positional testing indicates mild right on right forward sacral torsion. Seated forward flexion test is positive left. Prone knee active flexion test is positive left. Spring testing of lumbar spine exhibits decreased p/a springs through L2-5. Spring testing of sacrum exhibits decreased p/a spring of left sacral base. Spring testing of innominates exhibits decreased left innominate spring. AROM (PROM) Right Left   Hip flexion/Innominate flexion 90 85   Hip extension/Innominate extension 5 5   Hip external rotation (ER) 20 15   Hip internal rotation (IR) 10 10   Hip abduction 40 40     Strength: Lumbar protective mechanism (LPM) are not tested today for initiation. LPM Strength */5   R anterior to posterior diagonal    L anterior to posterior diagonal    R posterior to anterior diagonal    L posterior to anterior diagonal      Manual Muscle Test (*/5) Right Left   Knee extension 4 4   Knee flexion 4+ 4+   Hip flexion 4 4-   Hip ER 4 4-   Hip IR 4 4-   Hip extension 3+ 3+   Hip abduction 4- 3+   Hip adduction 5 5   Ankle DF 5 5   Ankle PF 5 5   Core stability 3+/5     Special Tests:  Brittany Memory test is negative. Scours test is negative  Neurological Screen:  Myotomes: Key muscle strength testing through bilateral LE is intact. Dermatomes: Sensation testing through bilateral lower quadrants for light touch is intact. Reflexes: Patellar (L4) and Achilles (S1) are not tested*. Neural tension tests: Passive straight leg raise (SLR) test is negative. Slump test is negative.    Functional Mobility:  Challenged with squatting, prolonged standing, balance and ambulation. Body Structures Involved:  1. Nerves  2. Bones  3. Joints  4. Muscles Body Functions Affected:  1. Sensory/Pain  2. Neuromusculoskeletal  3. Movement Related Activities and Participation Affected:  1. Mobility  2. Community, Social and [place]   Number of elements (examined above) that affect the Plan of Care: 3: MODERATE COMPLEXITY   CLINICAL PRESENTATION:   Presentation: Evolving clinical presentation with changing clinical characteristics: MODERATE COMPLEXITY   CLINICAL DECISION MAKING:   Outcome Measure: Tool Used: Lower Extremity Functional Scale (LEFS)  Score:  Initial: 52/80 Most Recent: X/80 (Date: -- )   Interpretation of Score: 20 questions each scored on a 5 point scale with 0 representing \"extreme difficulty or unable to perform\" and 4 representing \"no difficulty\". The lower the score, the greater the functional disability. 80/80 represents no disability. Minimal detectable change is 9 points. Score 80 79-63 62-48 47-32 31-16 15-1 0   Modifier CH CI CJ CK CL CM CN     ? Mobility - Walking and Moving Around:     - CURRENT STATUS: CJ - 20%-39% impaired, limited or restricted    - GOAL STATUS: CI - 1%-19% impaired, limited or restricted    - D/C STATUS:  ---------------To be determined---------------    Medical Necessity:   · Patient is expected to demonstrate progress in strength, range of motion, balance and functional technique to increase independence with ADLs, prolonged standing and walking techniques. .  Reason for Services/Other Comments:  · Patient continues to require skilled intervention due to challenged with endurance and pain levels in weight bearing positions.    Use of outcome tool(s) and clinical judgement create a POC that gives a: Questionable prediction of patient's progress: MODERATE COMPLEXITY            TREATMENT:   (In addition to Assessment/Re-Assessment sessions the following treatments were rendered)    Pre-treatment Symptoms/Complaints: Patient notes improved mobility in thoracic and lumbar spine, less discomfort noted in left pelvis. Pain: Initial:   Pain Intensity 1: 2  Pain Location 1: Spine, thoracic  Pain Orientation 1: Left, Posterior  Post Session:  1/10      Therapeutic Exercise: (20 Minutes):  Exercises per grid below to improve mobility, strength, balance and coordination. Required minimal visual and verbal cues to promote proper body alignment, promote proper body posture and promote proper body mechanics. Progressed resistance, range, repetitions and complexity of movement as indicated. Date:  4/13/2017   Activity/Exercise Parameters   Single knee to chest    Supine hamstrings stretch X 5 reps, 20 sec holds, BLE   Supine lower trunk rotation X 10 reps BLE   Supine abdominal brace 90/90 X 10 reps, 5 sec holds, single leg   Supine hip adductor stretch X 5 reps, 15 sec holds. BLE   Golf stretches    90/90 hold abdominal bracing X 5 reps, 10 sec holds   Side lying hip abduction X 10 reps, 5 sec holds, BLE         Manual Therapy (    Soft Tissue Mobilization Duration  Duration: 40 Minutes): Manual techniques to facilitate improved motion and decreased pain. · Supine bilateral iliopsoas release with FMP of lower trunk rotation, supine left hip adductor soft tissue mobilization with FMP of knee extension. · Prone soft tissue mobilization to bilateral thoracic and lumbar spine paraspinals, with strumming techniques and release around bony contours left greater than right of iliac       Crest, sacral sulcus and lower border of rib cage. · Prone bilateral hip on axis mobilization with manual resistance into hip IR. · Prone bilateral knee flexion/rectus femoris stretches, contract/relax techniques.   · Side lying bilaterally for pelvic PNF patterns rhythmic initiation into anterior elevation and posterior depression      (Used abbreviations: MET - muscle energy technique; PNF - proprioceptive neuromuscular facilitation; NMR - neuromuscular re-education; a/p - anterior to posterior; p/a - posterior to anterior, FMP - functional movement patterns)    Treatment/Session Assessment:    · Response to Treatment:  Improved overall mobility noted in pelvis and lumbar spine. Improving LE flexibility. · Compliance with Program/Exercises: compliant all of the time. · Recommendations/Intent for next treatment session: \"Next visit will focus on advancements to more challenging activities\".   Total Treatment Duration:  PT Patient Time In/Time Out  Time In: 1030  Time Out: 5500 Kesha Dang, PT

## 2017-04-18 ENCOUNTER — HOSPITAL ENCOUNTER (OUTPATIENT)
Dept: PHYSICAL THERAPY | Age: 82
Discharge: HOME | End: 2017-04-18
Payer: MEDICARE

## 2017-04-18 PROCEDURE — 97140 MANUAL THERAPY 1/> REGIONS: CPT

## 2017-04-18 PROCEDURE — 97110 THERAPEUTIC EXERCISES: CPT

## 2017-04-18 NOTE — PROGRESS NOTES
Mikael Cortez  : [facility] at [address] [place] [address]  [phone]   [phone]        OUTPATIENT PHYSICAL THERAPY:Daily Note 2017    ICD-10: Treatment Diagnosis: low back pain M54.5  ICD-10: Treatment Diagnosis: stiffness in joint, right hip M25.651  ICD-10: Treatment Diagnosis: stiffness in joint, left hip M25.652  Precautions/Allergies:   Review of patient's allergies indicates no known allergies. Fall Risk Score: 2 (? 5 = High Risk)  MD Orders: evaluate and treat MEDICAL/REFERRING DIAGNOSIS:  Back pain [M54.9]   DATE OF ONSET: chronic  REFERRING PHYSICIAN: Louis Perkins, *  RETURN PHYSICIAN APPOINTMENT: as needed     INITIAL ASSESSMENT:  Mr. Pedro Pablo Mueller is a 81 y.o. male who presents to physical therapy with chronic low back, bilateral hip (left greater than right) stiffness and mid-thoracic spine discomfort. He presents with soft tissue restrictions, arthrokinematic dysfunctions in thoracic/lumbar spine and pelvis, postural deficits, strength and endurance deficits. Patient would benefit from skilled physical therapy to improve overall mobility and function. PROBLEM LIST (Impacting functional limitations):  1. Decreased Strength  2. Decreased ADL/Functional Activities  3. Decreased Ambulation Ability/Technique  4. Decreased Balance  5. Increased Pain  6. Decreased Activity Tolerance  7. Decreased Flexibility/Joint Mobility INTERVENTIONS PLANNED:  1. Balance Exercise  2. Heat  3. Home Exercise Program (HEP)  4. Manual Therapy  5. Neuromuscular Re-education/Strengthening  6. Range of Motion (ROM)  7. Therapeutic Activites  8. Therapeutic Exercise/Strengthening   TREATMENT PLAN:  Effective Dates: 3/7/2017 TO 2017. Frequency/Duration: 2 times a week for 6 weeks, progress to 1x a week for 6 weeks.   GOALS: (Goals have been discussed and agreed upon with patient.)  Short-Term Functional Goals: Time Frame: 4 weeks  1. Patient demonstrates independence with his HEP without verbal cueing from therapist.  2. Patient able to ambulate for 15 minutes without onset of low back pain. 3. Patient able to stand for 20 minute to perform household/daily activities with pain no more than 0-2/10  Discharge Goals: Time Frame: 12 weeks  1. Improve LE and core stability strength to 5/5 to perform ADLs  2. Patient able to ambulate for 30 minutes without onset of low back pain. 3. Patient demonstrates ability to perform balance activities for 2 minutes without loss of balance. 4. Improve LEFS score from 52/80 to 62/80 to perform ADLs  Rehabilitation Potential For Stated Goals: Excellent  Regarding Vale Kaurost's therapy, I certify that the treatment plan above will be carried out by a therapist or under their direction. Thank you for this referral,  Celso Phillip, PT     Referring Physician Signature: Floyd Hauser., *              Date                    The information in this section was collected on 3/7/2017 (except where otherwise noted). HISTORY:   History of Present Injury/Illness (Reason for Referral):  Chronic history of low back pain and mid-thoracic spine pain. Notes increased discomfort and tightness through low back, left greater than right and into left hip, especially with weight bearing and walking. Patient notes he has had both of his hips replaced and has had continued challenges with his left hip. He is an avid golfer, however has been limited secondary to his back pain. Patient denies dizziness, drop attacks, numbness/tingling, bowel/bladder dysfunction and/or unexplained weight gain/loss. Past Medical History/Comorbidities:   Mr. Dilcia Lucas  has a past medical history of Abnormal cardiovascular function study (3/18/2016); Arthritis; Coronary atherosclerosis of native coronary vessel (3/18/2016); Coronary atherosclerosis of native coronary vessel (3/18/2016);  Dyspnea on exertion; HLD (hyperlipidemia) (3/18/2016); Hypertension; Seizures ([address] and Upper respiratory infection. Mr. Ann Marie Watters  has a past surgical history that includes orthopaedic and other surgical. bilateral total hip replacements. Social History/Living Environment:     Lives at independent living facility [facility], with his wife. No stairs in home. Prior Level of Function/Work/Activity:  Independent with tasks, however challenged with endurance and performance of activities secondary to pain levels. Retired from business owner of fitness facilities. Works out 1-2x a week, attempts to walk 4-5x a week. Dominant Side:         LEFT  Other Clinical Tests:          X-rays- arthritic changes in lumbar spine and hips. Current Medications:       Current Outpatient Prescriptions:     atenolol (TENORMIN) 25 mg tablet, Take 1 Tab by mouth daily. , Disp: 90 Tab, Rfl: 3    atorvastatin (LIPITOR) 40 mg tablet, Take 1 Tab by mouth daily. , Disp: 30 Tab, Rfl: 5    fluticasone (FLONASE) 50 mcg/actuation nasal spray, , Disp: , Rfl:     donepezil (ARICEPT) 5 mg tablet, , Disp: , Rfl:     lisinopril-hydrochlorothiazide (PRINZIDE, ZESTORETIC) 20-12.5 mg per tablet, , Disp: , Rfl:     tamsulosin (FLOMAX) 0.4 mg capsule, , Disp: , Rfl:     traZODone (DESYREL) 100 mg tablet, , Disp: , Rfl:     rosuvastatin (CRESTOR) 20 mg tablet, take 20 mg by mouth daily. , Disp: , Rfl:     olmesartan-hydrochlorothiazide (BENICAR HCT) 40-12.5 mg per tablet, take 1 Tab by mouth two (2) times a day., Disp: , Rfl:     NAPROXEN SODIUM (ALEVE PO), take  by mouth daily as needed. , Disp: , Rfl:    Date Last Reviewed:  4/18/2017     Number of Personal Factors/Comorbidities that affect the Plan of Care: 0: LOW COMPLEXITY   EXAMINATION:   Observation/Orthostatic Postural Assessment:          Patient denies any LE pain, paresthesia or symptoms. Patient denies any increase of symptoms with cough, sneeze or valsalva.  Patient denies any saddle paresthesia or bowel/bladder deficits. Patient exhibits a decreased lumbar lordosis and increased thoracic kyphosis. Lower extremity weight bearing is symmetrical. Shoulder symmetry exhibits right elevated. Observation of gait indicates decreased pelvic mobility (patient is a hip walker versus an efficient trunk walker). Lower quadrant bony landmarks are right iliac crest elevated compared to left, level greater trochanters in standing. Supine left malleolus superior compared to right Leg swing for innominate mobility indicates significant bilateral limitations in extension. Vertical compression test (VCT) for alignment is 2. Elbow flexion test (EFT) for motor activation is 2. Soft tissue observation indicates restrictions through bilateral iliopsoas, rectus femoris left greater than right, lumbar and thoracic spine paraspinals, left greater than right piriformis and hip rotators. Palpation: Myofascial assessment indicates restriction through mid-thoracic to lumbar spine superficial fascia. Muscle length testing in modified Berry position exhibits restricted left greater than right. Hamstring flexibility tested supine with straight leg raise (SLR) is restricted bilaterally, left 50, right 55 degrees. Piriformis length is restricted left greater than right. Quadriceps flexibility tested prone with heel to buttock is restricted bilaterally. Ely test is positive for rectus femoris tightness. Gastrocnemius and soleus flexibility are restricted, mild left greater than right. Sacrotuberous ligament is not tested today. Sacrococcygeal junction exhibits not tested today. Body of coccyx is not tested today. ROM: Passive trunk rotation is 70% available to the R and 70% available to the L. Kinetic testing in standing including forward bend test is positive left. Marcher's test is positive lefy. Pelvic shear test is standing exhibits decreased excursion of bilateral shear with a hard end feel.  Single plane active movements through lumbar spine in standing exhibit limited in all motions: flexion to mid-shin, extension limited by 50%. Functional squat for LE clearance is challenged with posterior translation of hip/pelvis. Lumbar positional testing at transverse processes indicates FRS right L4-5 with limitations in extension, rotation and side bending left, moderate restrictions. Sacral positional testing indicates mild right on right forward sacral torsion. Seated forward flexion test is positive left. Prone knee active flexion test is positive left. Spring testing of lumbar spine exhibits decreased p/a springs through L2-5. Spring testing of sacrum exhibits decreased p/a spring of left sacral base. Spring testing of innominates exhibits decreased left innominate spring. AROM (PROM) Right Left   Hip flexion/Innominate flexion 90 85   Hip extension/Innominate extension 5 5   Hip external rotation (ER) 20 15   Hip internal rotation (IR) 10 10   Hip abduction 40 40     Strength: Lumbar protective mechanism (LPM) are not tested today for initiation. LPM Strength */5   R anterior to posterior diagonal    L anterior to posterior diagonal    R posterior to anterior diagonal    L posterior to anterior diagonal      Manual Muscle Test (*/5) Right Left   Knee extension 4 4   Knee flexion 4+ 4+   Hip flexion 4 4-   Hip ER 4 4-   Hip IR 4 4-   Hip extension 3+ 3+   Hip abduction 4- 3+   Hip adduction 5 5   Ankle DF 5 5   Ankle PF 5 5   Core stability 3+/5     Special Tests:  Pearlean Clan test is negative. Scours test is negative  Neurological Screen:  Myotomes: Key muscle strength testing through bilateral LE is intact. Dermatomes: Sensation testing through bilateral lower quadrants for light touch is intact. Reflexes: Patellar (L4) and Achilles (S1) are not tested*. Neural tension tests: Passive straight leg raise (SLR) test is negative. Slump test is negative.    Functional Mobility:  Challenged with squatting, prolonged standing, balance and ambulation. Body Structures Involved:  1. Nerves  2. Bones  3. Joints  4. Muscles Body Functions Affected:  1. Sensory/Pain  2. Neuromusculoskeletal  3. Movement Related Activities and Participation Affected:  1. Mobility  2. Community, Social and [place]   Number of elements (examined above) that affect the Plan of Care: 3: MODERATE COMPLEXITY   CLINICAL PRESENTATION:   Presentation: Evolving clinical presentation with changing clinical characteristics: MODERATE COMPLEXITY   CLINICAL DECISION MAKING:   Outcome Measure: Tool Used: Lower Extremity Functional Scale (LEFS)  Score:  Initial: 52/80 Most Recent: X/80 (Date: -- )   Interpretation of Score: 20 questions each scored on a 5 point scale with 0 representing \"extreme difficulty or unable to perform\" and 4 representing \"no difficulty\". The lower the score, the greater the functional disability. 80/80 represents no disability. Minimal detectable change is 9 points. Score 80 79-63 62-48 47-32 31-16 15-1 0   Modifier CH CI CJ CK CL CM CN     ? Mobility - Walking and Moving Around:     - CURRENT STATUS: CJ - 20%-39% impaired, limited or restricted    - GOAL STATUS: CI - 1%-19% impaired, limited or restricted    - D/C STATUS:  ---------------To be determined---------------    Medical Necessity:   · Patient is expected to demonstrate progress in strength, range of motion, balance and functional technique to increase independence with ADLs, prolonged standing and walking techniques. .  Reason for Services/Other Comments:  · Patient continues to require skilled intervention due to challenged with endurance and pain levels in weight bearing positions.    Use of outcome tool(s) and clinical judgement create a POC that gives a: Questionable prediction of patient's progress: MODERATE COMPLEXITY            TREATMENT:   (In addition to Assessment/Re-Assessment sessions the following treatments were rendered)    Pre-treatment Symptoms/Complaints: Patient notes decreased pain levels into thoracic and lumbar spine, notes improved mobility throughout his day. Pain: Initial:   Pain Intensity 1: 1  Pain Location 1: Spine, thoracic, Spine, lumbar  Pain Orientation 1: Left  Post Session:  0/10      Therapeutic Exercise: (25 Minutes):  Exercises per grid below to improve mobility, strength, balance and coordination. Required minimal visual and verbal cues to promote proper body alignment, promote proper body posture and promote proper body mechanics. Progressed resistance, range, repetitions and complexity of movement as indicated. Date:  4/18/2017   Activity/Exercise Parameters   Single knee to chest X 5 reps, 10 sec holds   Supine hamstrings stretch X 5 reps, 20 sec holds, BLE   Supine lower trunk rotation X 10 reps BLE   Supine abdominal brace 90/90 X 10 reps, 5 sec holds, single leg   Supine hip adductor stretch X 5 reps, 15 sec holds. BLE   Golf stretches    90/90 hold abdominal bracing X 5 reps, 10 sec holds   Side lying hip abduction X 10 reps, 5 sec holds, BLE         Manual Therapy (    Soft Tissue Mobilization Duration  Duration: 35 Minutes): Manual techniques to facilitate improved motion and decreased pain. · Supine bilateral iliopsoas release with FMP of lower trunk rotation, supine left hip adductor soft tissue mobilization with FMP of knee extension. · Prone soft tissue mobilization to bilateral thoracic and lumbar spine paraspinals, with strumming techniques and release around bony contours left greater than right of iliac       Crest, sacral sulcus and lower border of rib cage. · Prone bilateral hip on axis mobilization with manual resistance into hip IR. · Prone bilateral knee flexion/rectus femoris stretches, contract/relax techniques.   · Side lying bilaterally for pelvic PNF patterns rhythmic initiation into anterior elevation and posterior depression      (Used abbreviations: MET - muscle energy technique; PNF - proprioceptive neuromuscular facilitation; NMR - neuromuscular re-education; a/p - anterior to posterior; p/a - posterior to anterior, FMP - functional movement patterns)    Treatment/Session Assessment:    · Response to Treatment:  Decreased soft tissue restrictions noted in left hip adductors at end of session today, improved spinal rotation noted with seated and standing positions. · Compliance with Program/Exercises: compliant all of the time. · Recommendations/Intent for next treatment session: \"Next visit will focus on advancements to more challenging activities\".   Total Treatment Duration:  PT Patient Time In/Time Out  Time In: 1330  Time Out: 15[facility] PT

## 2017-04-20 ENCOUNTER — APPOINTMENT (RX ONLY)
Dept: URBAN - METROPOLITAN AREA CLINIC 349 | Facility: CLINIC | Age: 82
Setting detail: DERMATOLOGY
End: 2017-04-20

## 2017-04-20 DIAGNOSIS — D22 MELANOCYTIC NEVI: ICD-10-CM | Status: STABLE

## 2017-04-20 DIAGNOSIS — Z85.828 PERSONAL HISTORY OF OTHER MALIGNANT NEOPLASM OF SKIN: ICD-10-CM

## 2017-04-20 DIAGNOSIS — L57.0 ACTINIC KERATOSIS: ICD-10-CM

## 2017-04-20 PROBLEM — M12.9 ARTHROPATHY, UNSPECIFIED: Status: ACTIVE | Noted: 2017-04-20

## 2017-04-20 PROBLEM — D22.72 MELANOCYTIC NEVI OF LEFT LOWER LIMB, INCLUDING HIP: Status: ACTIVE | Noted: 2017-04-20

## 2017-04-20 PROBLEM — D22.61 MELANOCYTIC NEVI OF RIGHT UPPER LIMB, INCLUDING SHOULDER: Status: ACTIVE | Noted: 2017-04-20

## 2017-04-20 PROBLEM — D22.5 MELANOCYTIC NEVI OF TRUNK: Status: ACTIVE | Noted: 2017-04-20

## 2017-04-20 PROBLEM — D22.62 MELANOCYTIC NEVI OF LEFT UPPER LIMB, INCLUDING SHOULDER: Status: ACTIVE | Noted: 2017-04-20

## 2017-04-20 PROBLEM — D22.71 MELANOCYTIC NEVI OF RIGHT LOWER LIMB, INCLUDING HIP: Status: ACTIVE | Noted: 2017-04-20

## 2017-04-20 PROCEDURE — ? LIQUID NITROGEN

## 2017-04-20 PROCEDURE — ? OBSERVATION

## 2017-04-20 PROCEDURE — 17004 DESTROY PREMAL LESIONS 15/>: CPT

## 2017-04-20 PROCEDURE — ? TREATMENT REGIMEN

## 2017-04-20 PROCEDURE — ? COUNSELING

## 2017-04-20 PROCEDURE — 99213 OFFICE O/P EST LOW 20 MIN: CPT | Mod: 25

## 2017-04-20 ASSESSMENT — LOCATION ZONE DERM
LOCATION ZONE: FACE
LOCATION ZONE: EAR
LOCATION ZONE: SCALP
LOCATION ZONE: LEG
LOCATION ZONE: ARM
LOCATION ZONE: TRUNK

## 2017-04-20 ASSESSMENT — LOCATION SIMPLE DESCRIPTION DERM
LOCATION SIMPLE: RIGHT POSTERIOR THIGH
LOCATION SIMPLE: POSTERIOR SCALP
LOCATION SIMPLE: RIGHT TEMPLE
LOCATION SIMPLE: RIGHT LOWER BACK
LOCATION SIMPLE: LEFT ZYGOMA
LOCATION SIMPLE: RIGHT EAR
LOCATION SIMPLE: LEFT POSTERIOR THIGH
LOCATION SIMPLE: RIGHT FOREHEAD
LOCATION SIMPLE: LEFT TEMPLE
LOCATION SIMPLE: LEFT PRETIBIAL REGION
LOCATION SIMPLE: LEFT CALF
LOCATION SIMPLE: LEFT FOREHEAD
LOCATION SIMPLE: RIGHT CALF
LOCATION SIMPLE: LEFT UPPER ARM
LOCATION SIMPLE: RIGHT UPPER ARM
LOCATION SIMPLE: LEFT ANKLE
LOCATION SIMPLE: RIGHT PRETIBIAL REGION

## 2017-04-20 ASSESSMENT — LOCATION DETAILED DESCRIPTION DERM
LOCATION DETAILED: MID-OCCIPITAL SCALP
LOCATION DETAILED: LEFT MEDIAL FOREHEAD
LOCATION DETAILED: LEFT DISTAL LATERAL POSTERIOR THIGH
LOCATION DETAILED: RIGHT DISTAL POSTERIOR THIGH
LOCATION DETAILED: RIGHT INFERIOR POSTERIOR HELIX
LOCATION DETAILED: RIGHT PROXIMAL POSTERIOR UPPER ARM
LOCATION DETAILED: RIGHT DISTAL CALF
LOCATION DETAILED: RIGHT SUPERIOR MEDIAL FOREHEAD
LOCATION DETAILED: LEFT POSTERIOR ANKLE
LOCATION DETAILED: LEFT PROXIMAL POSTERIOR UPPER ARM
LOCATION DETAILED: LEFT DISTAL PRETIBIAL REGION
LOCATION DETAILED: RIGHT MEDIAL DISTAL PRETIBIAL REGION
LOCATION DETAILED: RIGHT SUPERIOR POSTERIOR PARIETAL SCALP
LOCATION DETAILED: POSTERIOR MID-PARIETAL SCALP
LOCATION DETAILED: RIGHT SUPERIOR LATERAL LOWER BACK
LOCATION DETAILED: RIGHT DISTAL PRETIBIAL REGION
LOCATION DETAILED: LEFT CENTRAL TEMPLE
LOCATION DETAILED: LEFT CENTRAL ZYGOMA
LOCATION DETAILED: RIGHT SUPERIOR FOREHEAD
LOCATION DETAILED: LEFT FOREHEAD
LOCATION DETAILED: LEFT SUPERIOR FOREHEAD
LOCATION DETAILED: RIGHT MEDIAL TEMPLE
LOCATION DETAILED: LEFT DISTAL CALF

## 2017-04-20 ASSESSMENT — PAIN INTENSITY VAS: HOW INTENSE IS YOUR PAIN 0 BEING NO PAIN, 10 BEING THE MOST SEVERE PAIN POSSIBLE?: NO PAIN

## 2017-04-20 NOTE — PROCEDURE: TREATMENT REGIMEN
Detail Level: Detailed
Plan: Patient has a prescription for Efudex cream at home Dr. 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

## 2017-04-20 NOTE — PROCEDURE: LIQUID NITROGEN
Duration Of Freeze Thaw-Cycle (Seconds): 0
Post-Care Instructions: I reviewed with the patient in detail post-care instructions. Patient is to wear sunprotection, and avoid picking at any of the treated lesions. Pt may apply Vaseline to crusted or scabbing areas.
Render Post-Care Instructions In Note?: no
Consent: The patient's consent was obtained including but not limited to risks of crusting, scabbing, blistering, scarring, darker or lighter pigmentary change, recurrence, incomplete removal and infection.
Number Of Freeze-Thaw Cycles: 2 freeze-thaw cycles
Detail Level: Detailed

## 2017-04-25 ENCOUNTER — HOSPITAL ENCOUNTER (OUTPATIENT)
Dept: PHYSICAL THERAPY | Age: 82
Discharge: HOME | End: 2017-04-25
Payer: MEDICARE

## 2017-04-25 PROCEDURE — 97110 THERAPEUTIC EXERCISES: CPT

## 2017-04-25 PROCEDURE — 97140 MANUAL THERAPY 1/> REGIONS: CPT

## 2017-04-25 NOTE — PROGRESS NOTES
Stanislaw Campbell  : [facility] at [address] [place] [address]  [phone]   [phone]        OUTPATIENT PHYSICAL THERAPY:Daily Note 2017    ICD-10: Treatment Diagnosis: low back pain M54.5  ICD-10: Treatment Diagnosis: stiffness in joint, right hip M25.651  ICD-10: Treatment Diagnosis: stiffness in joint, left hip M25.652  Precautions/Allergies:   Review of patient's allergies indicates no known allergies. Fall Risk Score: 2 (? 5 = High Risk)  MD Orders: evaluate and treat MEDICAL/REFERRING DIAGNOSIS:  Back pain [M54.9]   DATE OF ONSET: chronic  REFERRING PHYSICIAN: Alison Wheat, *  RETURN PHYSICIAN APPOINTMENT: as needed     INITIAL ASSESSMENT:  Mr. Clifford Regan is a 81 y.o. male who presents to physical therapy with chronic low back, bilateral hip (left greater than right) stiffness and mid-thoracic spine discomfort. He presents with soft tissue restrictions, arthrokinematic dysfunctions in thoracic/lumbar spine and pelvis, postural deficits, strength and endurance deficits. Patient would benefit from skilled physical therapy to improve overall mobility and function. PROBLEM LIST (Impacting functional limitations):  1. Decreased Strength  2. Decreased ADL/Functional Activities  3. Decreased Ambulation Ability/Technique  4. Decreased Balance  5. Increased Pain  6. Decreased Activity Tolerance  7. Decreased Flexibility/Joint Mobility INTERVENTIONS PLANNED:  1. Balance Exercise  2. Heat  3. Home Exercise Program (HEP)  4. Manual Therapy  5. Neuromuscular Re-education/Strengthening  6. Range of Motion (ROM)  7. Therapeutic Activites  8. Therapeutic Exercise/Strengthening   TREATMENT PLAN:  Effective Dates: 3/7/2017 TO 2017. Frequency/Duration: 2 times a week for 6 weeks, progress to 1x a week for 6 weeks.   GOALS: (Goals have been discussed and agreed upon with patient.)  Short-Term Functional Goals: Time Frame: 4 weeks  1. Patient demonstrates independence with his HEP without verbal cueing from therapist.  2. Patient able to ambulate for 15 minutes without onset of low back pain. 3. Patient able to stand for 20 minute to perform household/daily activities with pain no more than 0-2/10  Discharge Goals: Time Frame: 12 weeks  1. Improve LE and core stability strength to 5/5 to perform ADLs  2. Patient able to ambulate for 30 minutes without onset of low back pain. 3. Patient demonstrates ability to perform balance activities for 2 minutes without loss of balance. 4. Improve LEFS score from 52/80 to 62/80 to perform ADLs  Rehabilitation Potential For Stated Goals: Excellent  Regarding Bianca Go's therapy, I certify that the treatment plan above will be carried out by a therapist or under their direction. Thank you for this referral,  Mauricio Colmenares PT     Referring Physician Signature: Bria Cristina., *              Date                    The information in this section was collected on 3/7/2017 (except where otherwise noted). HISTORY:   History of Present Injury/Illness (Reason for Referral):  Chronic history of low back pain and mid-thoracic spine pain. Notes increased discomfort and tightness through low back, left greater than right and into left hip, especially with weight bearing and walking. Patient notes he has had both of his hips replaced and has had continued challenges with his left hip. He is an avid golfer, however has been limited secondary to his back pain. Patient denies dizziness, drop attacks, numbness/tingling, bowel/bladder dysfunction and/or unexplained weight gain/loss. Past Medical History/Comorbidities:   Mr. Susannah Sims  has a past medical history of Abnormal cardiovascular function study (3/18/2016); Arthritis; Coronary atherosclerosis of native coronary vessel (3/18/2016); Coronary atherosclerosis of native coronary vessel (3/18/2016);  Dyspnea on exertion; HLD (hyperlipidemia) (3/18/2016); Hypertension; Seizures ([address] and Upper respiratory infection. Mr. Alejandra Ann  has a past surgical history that includes orthopaedic and other surgical. bilateral total hip replacements. Social History/Living Environment:     Lives at independent living facility [facility], with his wife. No stairs in home. Prior Level of Function/Work/Activity:  Independent with tasks, however challenged with endurance and performance of activities secondary to pain levels. Retired from business owner of fitness facilities. Works out 1-2x a week, attempts to walk 4-5x a week. Dominant Side:         LEFT  Other Clinical Tests:          X-rays- arthritic changes in lumbar spine and hips. Current Medications:       Current Outpatient Prescriptions:     atenolol (TENORMIN) 25 mg tablet, Take 1 Tab by mouth daily. , Disp: 90 Tab, Rfl: 3    atorvastatin (LIPITOR) 40 mg tablet, Take 1 Tab by mouth daily. , Disp: 30 Tab, Rfl: 5    fluticasone (FLONASE) 50 mcg/actuation nasal spray, , Disp: , Rfl:     donepezil (ARICEPT) 5 mg tablet, , Disp: , Rfl:     lisinopril-hydrochlorothiazide (PRINZIDE, ZESTORETIC) 20-12.5 mg per tablet, , Disp: , Rfl:     tamsulosin (FLOMAX) 0.4 mg capsule, , Disp: , Rfl:     traZODone (DESYREL) 100 mg tablet, , Disp: , Rfl:     rosuvastatin (CRESTOR) 20 mg tablet, take 20 mg by mouth daily. , Disp: , Rfl:     olmesartan-hydrochlorothiazide (BENICAR HCT) 40-12.5 mg per tablet, take 1 Tab by mouth two (2) times a day., Disp: , Rfl:     NAPROXEN SODIUM (ALEVE PO), take  by mouth daily as needed. , Disp: , Rfl:    Date Last Reviewed:  4/25/2017     Number of Personal Factors/Comorbidities that affect the Plan of Care: 0: LOW COMPLEXITY   EXAMINATION:   Observation/Orthostatic Postural Assessment:          Patient denies any LE pain, paresthesia or symptoms. Patient denies any increase of symptoms with cough, sneeze or valsalva.  Patient denies any saddle paresthesia or bowel/bladder deficits. Patient exhibits a decreased lumbar lordosis and increased thoracic kyphosis. Lower extremity weight bearing is symmetrical. Shoulder symmetry exhibits right elevated. Observation of gait indicates decreased pelvic mobility (patient is a hip walker versus an efficient trunk walker). Lower quadrant bony landmarks are right iliac crest elevated compared to left, level greater trochanters in standing. Supine left malleolus superior compared to right Leg swing for innominate mobility indicates significant bilateral limitations in extension. Vertical compression test (VCT) for alignment is 2. Elbow flexion test (EFT) for motor activation is 2. Soft tissue observation indicates restrictions through bilateral iliopsoas, rectus femoris left greater than right, lumbar and thoracic spine paraspinals, left greater than right piriformis and hip rotators. Palpation: Myofascial assessment indicates restriction through mid-thoracic to lumbar spine superficial fascia. Muscle length testing in modified Berry position exhibits restricted left greater than right. Hamstring flexibility tested supine with straight leg raise (SLR) is restricted bilaterally, left 50, right 55 degrees. Piriformis length is restricted left greater than right. Quadriceps flexibility tested prone with heel to buttock is restricted bilaterally. Ely test is positive for rectus femoris tightness. Gastrocnemius and soleus flexibility are restricted, mild left greater than right. Sacrotuberous ligament is not tested today. Sacrococcygeal junction exhibits not tested today. Body of coccyx is not tested today. ROM: Passive trunk rotation is 70% available to the R and 70% available to the L. Kinetic testing in standing including forward bend test is positive left. Marcher's test is positive lefy. Pelvic shear test is standing exhibits decreased excursion of bilateral shear with a hard end feel.  Single plane active movements through lumbar spine in standing exhibit limited in all motions: flexion to mid-shin, extension limited by 50%. Functional squat for LE clearance is challenged with posterior translation of hip/pelvis. Lumbar positional testing at transverse processes indicates FRS right L4-5 with limitations in extension, rotation and side bending left, moderate restrictions. Sacral positional testing indicates mild right on right forward sacral torsion. Seated forward flexion test is positive left. Prone knee active flexion test is positive left. Spring testing of lumbar spine exhibits decreased p/a springs through L2-5. Spring testing of sacrum exhibits decreased p/a spring of left sacral base. Spring testing of innominates exhibits decreased left innominate spring. AROM (PROM) Right Left   Hip flexion/Innominate flexion 90 85   Hip extension/Innominate extension 5 5   Hip external rotation (ER) 20 15   Hip internal rotation (IR) 10 10   Hip abduction 40 40     Strength: Lumbar protective mechanism (LPM) are not tested today for initiation. LPM Strength */5   R anterior to posterior diagonal    L anterior to posterior diagonal    R posterior to anterior diagonal    L posterior to anterior diagonal      Manual Muscle Test (*/5) Right Left   Knee extension 4 4   Knee flexion 4+ 4+   Hip flexion 4 4-   Hip ER 4 4-   Hip IR 4 4-   Hip extension 3+ 3+   Hip abduction 4- 3+   Hip adduction 5 5   Ankle DF 5 5   Ankle PF 5 5   Core stability 3+/5     Special Tests:  Hazle Lesley test is negative. Scours test is negative  Neurological Screen:  Myotomes: Key muscle strength testing through bilateral LE is intact. Dermatomes: Sensation testing through bilateral lower quadrants for light touch is intact. Reflexes: Patellar (L4) and Achilles (S1) are not tested*. Neural tension tests: Passive straight leg raise (SLR) test is negative. Slump test is negative.    Functional Mobility:  Challenged with squatting, prolonged standing, balance and ambulation. Body Structures Involved:  1. Nerves  2. Bones  3. Joints  4. Muscles Body Functions Affected:  1. Sensory/Pain  2. Neuromusculoskeletal  3. Movement Related Activities and Participation Affected:  1. Mobility  2. Community, Social and [place] [facility]   Number of elements (examined above) that affect the Plan of Care: 3: MODERATE COMPLEXITY   CLINICAL PRESENTATION:   Presentation: Evolving clinical presentation with changing clinical characteristics: MODERATE COMPLEXITY   CLINICAL DECISION MAKING:   Outcome Measure: Tool Used: Lower Extremity Functional Scale (LEFS)  Score:  Initial: 52/80 Most Recent: X/80 (Date: -- )   Interpretation of Score: 20 questions each scored on a 5 point scale with 0 representing \"extreme difficulty or unable to perform\" and 4 representing \"no difficulty\". The lower the score, the greater the functional disability. 80/80 represents no disability. Minimal detectable change is 9 points. Score 80 79-63 62-48 47-32 31-16 15-1 0   Modifier CH CI CJ CK CL CM CN     ? Mobility - Walking and Moving Around:     - CURRENT STATUS: CJ - 20%-39% impaired, limited or restricted    - GOAL STATUS: CI - 1%-19% impaired, limited or restricted    - D/C STATUS:  ---------------To be determined---------------    Medical Necessity:   · Patient is expected to demonstrate progress in strength, range of motion, balance and functional technique to increase independence with ADLs, prolonged standing and walking techniques. .  Reason for Services/Other Comments:  · Patient continues to require skilled intervention due to challenged with endurance and pain levels in weight bearing positions.    Use of outcome tool(s) and clinical judgement create a POC that gives a: Questionable prediction of patient's progress: MODERATE COMPLEXITY            TREATMENT:   (In addition to Assessment/Re-Assessment sessions the following treatments were rendered)    Pre-treatment Symptoms/Complaints: Patient notes exercises are going well at home, able to perform them 2x a day. More mobile in AM with less stiffness noted in low back  Pain: Initial:   Pain Intensity 1: 1  Pain Location 1: Spine, lumbar  Pain Orientation 1: Left  Post Session:  0/10      Therapeutic Exercise: (15 Minutes):  Exercises per grid below to improve mobility, strength, balance and coordination. Required minimal visual and verbal cues to promote proper body alignment, promote proper body posture and promote proper body mechanics. Progressed resistance, range, repetitions and complexity of movement as indicated. Date:  4/25/2017   Activity/Exercise Parameters   Single knee to chest X 5 reps, 10 sec holds   Supine hamstrings stretch X 5 reps, 20 sec holds, BLE   Supine lower trunk rotation X 10 reps BLE  X 10 reps bilaterally with manual resistance and traction for activation of multifidi    Supine abdominal brace 90/90 X 10 reps, 5 sec holds, single leg   Supine hip adductor stretch    Golf stretches    90/90 hold abdominal bracing    Side lying hip abduction          Manual Therapy (    Soft Tissue Mobilization Duration  Duration: 45 Minutes): Manual techniques to facilitate improved motion and decreased pain. · Supine bilateral iliopsoas release with FMP of lower trunk rotation, supine left hip adductor soft tissue mobilization with FMP of knee extension. · Prone soft tissue mobilization to bilateral thoracic and lumbar spine paraspinals, with strumming techniques and release around bony contours left greater than right of iliac       Crest, sacral sulcus and lower border of rib cage. · Prone bilateral hip on axis mobilization with manual resistance into hip IR. · Prone bilateral knee flexion/rectus femoris stretches, contract/relax techniques. · Manual resistance to activate multifidi muscles with lower trunk rotation  · Hooklying position, manual resistance and traction to left LE, all directions.        (Used abbreviations: MET - muscle energy technique; PNF - proprioceptive neuromuscular facilitation; NMR - neuromuscular re-education; a/p - anterior to posterior; p/a - posterior to anterior, FMP - functional movement patterns)    Treatment/Session Assessment:    · Response to Treatment:  Improved activation of multifidi in lumbar spine today, decreased soft tissue restrictions to left hip adductors. · Compliance with Program/Exercises: compliant all of the time. · Recommendations/Intent for next treatment session: \"Next visit will focus on advancements to more challenging activities\".   Total Treatment Duration:  PT Patient Time In/Time Out  Time In: 1330  Time Out: [address] [place] [address] Xena Rogers PT

## 2017-05-02 ENCOUNTER — HOSPITAL ENCOUNTER (OUTPATIENT)
Dept: PHYSICAL THERAPY | Age: 82
Discharge: HOME | End: 2017-05-02
Payer: MEDICARE

## 2017-05-02 PROCEDURE — 97140 MANUAL THERAPY 1/> REGIONS: CPT

## 2017-05-02 PROCEDURE — 97110 THERAPEUTIC EXERCISES: CPT

## 2017-05-02 PROCEDURE — G8978 MOBILITY CURRENT STATUS: HCPCS

## 2017-05-02 PROCEDURE — G8979 MOBILITY GOAL STATUS: HCPCS

## 2017-05-03 NOTE — PROGRESS NOTES
Domo Dupont  : [facility] [address]  [phone]   [phone]        OUTPATIENT PHYSICAL THERAPY:Daily Note and Progress Report 2017    ICD-10: Treatment Diagnosis: low back pain M54.5  ICD-10: Treatment Diagnosis: stiffness in joint, right hip M25.651  ICD-10: Treatment Diagnosis: stiffness in joint, left hip M25.652  Precautions/Allergies:   Review of patient's allergies indicates no known allergies. Fall Risk Score: 2 (? 5 = High Risk)  MD Orders: evaluate and treat MEDICAL/REFERRING DIAGNOSIS:  Back pain [M54.9]   DATE OF ONSET: chronic  REFERRING PHYSICIAN: Elsy Ribera, *  RETURN PHYSICIAN APPOINTMENT: as needed     INITIAL ASSESSMENT:  Mr. Stone is a 81 y.o. male who presents to physical therapy with chronic low back, bilateral hip (left greater than right) stiffness and mid-thoracic spine discomfort. He presents with soft tissue restrictions, arthrokinematic dysfunctions in thoracic/lumbar spine and pelvis, postural deficits, strength and endurance deficits. Patient would benefit from skilled physical therapy to improve overall mobility and function. 17: Patient demonstrates improve mobility overall in thoracic and lumbar spine, improving endurance with ambulation. Continues to be challenged with core and hip/gluteal strengthening/endurance, especially in weight bearing. He would benefit from continued therapy services to improve remaining strength and endurance deficits as well as soft tissue restrictions. PROBLEM LIST (Impacting functional limitations):  1. Decreased Strength  2. Decreased ADL/Functional Activities  3. Decreased Ambulation Ability/Technique  4. Decreased Balance  5. Increased Pain  6. Decreased Activity Tolerance  7. Decreased Flexibility/Joint Mobility INTERVENTIONS PLANNED:  1. Balance Exercise  2. Heat  3. Home Exercise Program (HEP)  4.  Manual Therapy  5. Neuromuscular Re-education/Strengthening  6. Range of Motion (ROM)  7. Therapeutic Activites  8. Therapeutic Exercise/Strengthening   TREATMENT PLAN:  Effective Dates: 3/7/2017 TO 5/30/2017. Frequency/Duration: 2 times a week for 6 weeks, progress to 1x a week for 6 weeks. GOALS: (Goals have been discussed and agreed upon with patient.)  Short-Term Functional Goals: Time Frame: 4 weeks  1. Patient demonstrates independence with his HEP without verbal cueing from therapist. GOAL MET  2. Patient able to ambulate for 15 minutes without onset of low back pain. GOAL MET  3. Patient able to stand for 20 minute to perform household/daily activities with pain no more than 0-2/10 - ALMOST MET  Discharge Goals: Time Frame: 12 weeks  1. Improve LE and core stability strength to 5/5 to perform ADLs - ONGOING  2. Patient able to ambulate for 30 minutes without onset of low back pain. - ONGOING  3. Patient demonstrates ability to perform balance activities for 2 minutes without loss of balance. - ONGOING  4. Improve LEFS score from 52/80 to 62/80 to perform ADLs - ALMOST MET  Rehabilitation Potential For Stated Goals: Excellent  Regarding Marina Andreas Go's therapy, I certify that the treatment plan above will be carried out by a therapist or under their direction. Thank you for this referral,  Gabbie Leo PT     Referring Physician Signature: Emeterio Gutiérrez., *              Date                    The information in this section was collected on 3/7/2017 (except where otherwise noted). HISTORY:   History of Present Injury/Illness (Reason for Referral):  Chronic history of low back pain and mid-thoracic spine pain. Notes increased discomfort and tightness through low back, left greater than right and into left hip, especially with weight bearing and walking. Patient notes he has had both of his hips replaced and has had continued challenges with his left hip.  He is an avid golfer, however has been limited secondary to his back pain. Patient denies dizziness, drop attacks, numbness/tingling, bowel/bladder dysfunction and/or unexplained weight gain/loss. Past Medical History/Comorbidities:   Mr. Sharath Vo  has a past medical history of Abnormal cardiovascular function study (3/18/2016); Arthritis; Coronary atherosclerosis of native coronary vessel (3/18/2016); Coronary atherosclerosis of native coronary vessel (3/18/2016); Dyspnea on exertion; HLD (hyperlipidemia) (3/18/2016); Hypertension; Seizures ([address] and Upper respiratory infection. Mr. Sharath Vo  has a past surgical history that includes orthopaedic and other surgical. bilateral total hip replacements. Social History/Living Environment:     Lives at independent living facility [facility], with his wife. No stairs in home. Prior Level of Function/Work/Activity:  Independent with tasks, however challenged with endurance and performance of activities secondary to pain levels. 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 Works out 1-2x a week, attempts to walk 4-5x a week. Dominant Side:         LEFT  Other Clinical Tests:          X-rays- arthritic changes in lumbar spine and hips. Current Medications:       Current Outpatient Prescriptions:     atenolol (TENORMIN) 25 mg tablet, Take 1 Tab by mouth daily. , Disp: 90 Tab, Rfl: 3    atorvastatin (LIPITOR) 40 mg tablet, Take 1 Tab by mouth daily. , Disp: 30 Tab, Rfl: 5    fluticasone (FLONASE) 50 mcg/actuation nasal spray, , Disp: , Rfl:     donepezil (ARICEPT) 5 mg tablet, , Disp: , Rfl:     lisinopril-hydrochlorothiazide (PRINZIDE, ZESTORETIC) 20-12.5 mg per tablet, , Disp: , Rfl:     tamsulosin (FLOMAX) 0.4 mg capsule, , Disp: , Rfl:     traZODone (DESYREL) 100 mg tablet, , Disp: , Rfl:     rosuvastatin (CRESTOR) 20 mg tablet, take 20 mg by mouth daily. , Disp: , Rfl:     olmesartan-hydrochlorothiazide (BENICAR HCT) 40-12.5 mg per tablet, take 1 Tab by mouth two (2) times a day., Disp: , Rfl:     NAPROXEN SODIUM (ALEVE PO), take  by mouth daily as needed. , Disp: , Rfl:    Date Last Reviewed:  5/2/2017     Number of Personal Factors/Comorbidities that affect the Plan of Care: 0: LOW COMPLEXITY   EXAMINATION:   Observation/Orthostatic Postural Assessment:          Patient denies any LE pain, paresthesia or symptoms. Patient denies any increase of symptoms with cough, sneeze or valsalva. Patient denies any saddle paresthesia or bowel/bladder deficits. Patient exhibits a decreased lumbar lordosis and increased thoracic kyphosis. Lower extremity weight bearing is symmetrical. Shoulder symmetry exhibits right elevated. Observation of gait indicates decreased pelvic mobility (patient is a hip walker versus an efficient trunk walker). Lower quadrant bony landmarks are right iliac crest elevated compared to left, level greater trochanters in standing. Supine left malleolus superior compared to right Leg swing for innominate mobility indicates significant bilateral limitations in extension. Vertical compression test (VCT) for alignment is 2. Elbow flexion test (EFT) for motor activation is 2+. Soft tissue observation indicates restrictions through bilateral iliopsoas, rectus femoris left greater than right, lumbar and thoracic spine paraspinals, left greater than right piriformis and hip rotators. Improving. Restrictions noted in left hip adductors. Palpation: Myofascial assessment indicates restriction through mid-thoracic to lumbar spine superficial fascia. Muscle length testing in modified Berry position exhibits restricted left greater than right improving. Hamstring flexibility tested supine with straight leg raise (SLR) is restricted bilaterally, left 55, right 60 degrees. Piriformis length is restricted left greater than right. improving Quadriceps flexibility tested prone with heel to buttock is restricted bilaterally.  improving Donnie Dukes test is positive for rectus femoris tightness improving. Gastrocnemius and soleus flexibility are restricted, mild left greater than right. Sacrotuberous ligament is not tested today. Sacrococcygeal junction exhibits not tested today. Body of coccyx is not tested today. ROM: Passive trunk rotation is 75% available to the R and 80% available to the L. Kinetic testing in standing including forward bend test is positive left. Marcher's test is positive lefy. Pelvic shear test is standing exhibits decreased excursion of bilateral shear with a hard end feel. Single plane active movements through lumbar spine in standing exhibit limited in all motions: flexion to mid-shin, extension limited by 50%. Functional squat for LE clearance is challenged with posterior translation of hip/pelvis. Lumbar positional testing at transverse processes indicates FRS right L4-5 with limitations in extension, rotation and side bending left, moderate restrictions. Improving Sacral positional testing indicates mild right on right forward sacral torsion. Seated forward flexion test is positive left. Prone knee active flexion test is positive left. Spring testing of lumbar spine exhibits decreased p/a springs through L2-5. Spring testing of sacrum exhibits decreased p/a spring of left sacral base. Spring testing of innominates exhibits decreased left innominate spring. improving  AROM (PROM) Right Left   Hip flexion/Innominate flexion 90 85   Hip extension/Innominate extension 5 5   Hip external rotation (ER) 20 15   Hip internal rotation (IR) 10 10   Hip abduction 40 40     Strength: Lumbar protective mechanism (LPM) are sluggish for initiation.    LPM Strength */5   R anterior to posterior diagonal 2   L anterior to posterior diagonal 2   R posterior to anterior diagonal 2   L posterior to anterior diagonal 2     Manual Muscle Test (*/5) Right Left   Knee extension 4 4   Knee flexion 4+ 4+   Hip flexion 4 4-   Hip ER 4 4-   Hip IR 4 4-   Hip extension 3+ 3+   Hip abduction 4- 3+ Hip adduction 5 5   Ankle DF 5 5   Ankle PF 5 5   Core stability 3+/5     Special Tests:  Judithann Factor test is negative. Scours test is negative  Neurological Screen:  Myotomes: Key muscle strength testing through bilateral LE is intact. Dermatomes: Sensation testing through bilateral lower quadrants for light touch is intact. Reflexes: Patellar (L4) and Achilles (S1) are not tested*. Neural tension tests: Passive straight leg raise (SLR) test is negative. Slump test is negative. Functional Mobility:  Challenged with squatting, prolonged standing, balance and ambulation. Body Structures Involved:  1. Nerves  2. Bones  3. Joints  4. Muscles Body Functions Affected:  1. Sensory/Pain  2. Neuromusculoskeletal  3. Movement Related Activities and Participation Affected:  1. Mobility  2. Community, Social and [place]   Number of elements (examined above) that affect the Plan of Care: 3: MODERATE COMPLEXITY   CLINICAL PRESENTATION:   Presentation: Evolving clinical presentation with changing clinical characteristics: MODERATE COMPLEXITY   CLINICAL DECISION MAKING:   Outcome Measure: Tool Used: Lower Extremity Functional Scale (LEFS)  Score:  Initial: 52/80 Most Recent: 60/80 (Date: 5/2/17 )   Interpretation of Score: 20 questions each scored on a 5 point scale with 0 representing \"extreme difficulty or unable to perform\" and 4 representing \"no difficulty\". The lower the score, the greater the functional disability. 80/80 represents no disability. Minimal detectable change is 9 points. Score 80 79-63 62-48 47-32 31-16 15-1 0   Modifier CH CI CJ CK CL CM CN     ?  Mobility - Walking and Moving Around:     - CURRENT STATUS: CJ - 20%-39% impaired, limited or restricted    - GOAL STATUS: CI - 1%-19% impaired, limited or restricted    - D/C STATUS:  ---------------To be determined---------------    Medical Necessity:   · Patient is expected to demonstrate progress in strength, range of motion, balance and functional technique to increase independence with ADLs, prolonged standing and walking techniques. .  Reason for Services/Other Comments:  · Patient continues to require skilled intervention due to challenged with endurance and pain levels in weight bearing positions. Use of outcome tool(s) and clinical judgement create a POC that gives a: Questionable prediction of patient's progress: MODERATE COMPLEXITY            TREATMENT:   (In addition to Assessment/Re-Assessment sessions the following treatments were rendered)    Pre-treatment Symptoms/Complaints:  Patient notes just had his annual physical with his MD and notes he is in good health. Pain: Initial:   Pain Intensity 1: 1  Pain Location 1: Spine, lumbar  Pain Orientation 1: Posterior  Post Session:  0/10      Therapeutic Exercise: (20 Minutes):  Exercises per grid below to improve mobility, strength, balance and coordination. Required minimal visual and verbal cues to promote proper body alignment, promote proper body posture and promote proper body mechanics. Progressed resistance, range, repetitions and complexity of movement as indicated. Date:  5/2/2017   Activity/Exercise Parameters   Single knee to chest X 5 reps, 10 sec holds   Supine hamstrings stretch X 5 reps, 20 sec holds, BLE   Supine lower trunk rotation X 10 reps BLE  X 10 reps bilaterally with manual resistance and traction for activation of multifidi    Supine abdominal brace 90/90 X 10 reps, 5 sec holds, single leg   Supine hip adductor stretch    Golf stretches    90/90 hold abdominal bracing    Side lying hip abduction    Standing hip exercises X 10 reps hip flexion, abduction and extension on firm foam         Manual Therapy (    Soft Tissue Mobilization Duration  Duration: 40 Minutes): Manual techniques to facilitate improved motion and decreased pain.   · Supine bilateral iliopsoas release with FMP of lower trunk rotation, supine left hip adductor soft tissue mobilization with FMP of knee extension. · Prone soft tissue mobilization to bilateral thoracic and lumbar spine paraspinals, with strumming techniques and release around bony contours left greater than right of iliac       Crest, sacral sulcus and lower border of rib cage. · Prone bilateral hip on axis mobilization with manual resistance into hip IR. · Prone bilateral knee flexion/rectus femoris stretches, contract/relax techniques. (Used abbreviations: MET - muscle energy technique; PNF - proprioceptive neuromuscular facilitation; NMR - neuromuscular re-education; a/p - anterior to posterior; p/a - posterior to anterior, FMP - functional movement patterns)    Treatment/Session Assessment:    · Response to Treatment:  Challenged with endurance in weight bearing, continues to be challenged with obtaining hip extension in weight bearing positions. · Compliance with Program/Exercises: compliant all of the time. · Recommendations/Intent for next treatment session: \"Next visit will focus on advancements to more challenging activities\".   Total Treatment Duration:  PT Patient Time In/Time Out  Time In: 1330  Time Out: [address] [place] [address] Xena Rogers PT

## 2017-05-09 ENCOUNTER — HOSPITAL ENCOUNTER (OUTPATIENT)
Dept: PHYSICAL THERAPY | Age: 82
Discharge: HOME | End: 2017-05-09
Payer: MEDICARE

## 2017-05-09 PROCEDURE — 97110 THERAPEUTIC EXERCISES: CPT

## 2017-05-09 PROCEDURE — 97140 MANUAL THERAPY 1/> REGIONS: CPT

## 2017-05-10 NOTE — PROGRESS NOTES
Carole Rangel  : [facility] [address]  [phone]   [phone]        OUTPATIENT PHYSICAL THERAPY:Daily Note 2017    ICD-10: Treatment Diagnosis: low back pain M54.5  ICD-10: Treatment Diagnosis: stiffness in joint, right hip M25.651  ICD-10: Treatment Diagnosis: stiffness in joint, left hip M25.652  Precautions/Allergies:   Review of patient's allergies indicates no known allergies. Fall Risk Score: 2 (? 5 = High Risk)  MD Orders: evaluate and treat MEDICAL/REFERRING DIAGNOSIS:  Back pain [M54.9]   DATE OF ONSET: chronic  REFERRING PHYSICIAN: Marixa Fu, *  RETURN PHYSICIAN APPOINTMENT: as needed     INITIAL ASSESSMENT:  Mr. Nic Gomez is a 81 y.o. male who presents to physical therapy with chronic low back, bilateral hip (left greater than right) stiffness and mid-thoracic spine discomfort. He presents with soft tissue restrictions, arthrokinematic dysfunctions in thoracic/lumbar spine and pelvis, postural deficits, strength and endurance deficits. Patient would benefit from skilled physical therapy to improve overall mobility and function. 17: Patient demonstrates improve mobility overall in thoracic and lumbar spine, improving endurance with ambulation. Continues to be challenged with core and hip/gluteal strengthening/endurance, especially in weight bearing. He would benefit from continued therapy services to improve remaining strength and endurance deficits as well as soft tissue restrictions. PROBLEM LIST (Impacting functional limitations):  1. Decreased Strength  2. Decreased ADL/Functional Activities  3. Decreased Ambulation Ability/Technique  4. Decreased Balance  5. Increased Pain  6. Decreased Activity Tolerance  7. Decreased Flexibility/Joint Mobility INTERVENTIONS PLANNED:  1. Balance Exercise  2. Heat  3. Home Exercise Program (HEP)  4. Manual Therapy  5.  Neuromuscular Re-education/Strengthening  6. Range of Motion (ROM)  7. Therapeutic Activites  8. Therapeutic Exercise/Strengthening   TREATMENT PLAN:  Effective Dates: 3/7/2017 TO 5/30/2017. Frequency/Duration: 2 times a week for 6 weeks, progress to 1x a week for 6 weeks. GOALS: (Goals have been discussed and agreed upon with patient.)  Short-Term Functional Goals: Time Frame: 4 weeks  1. Patient demonstrates independence with his HEP without verbal cueing from therapist. GOAL MET  2. Patient able to ambulate for 15 minutes without onset of low back pain. GOAL MET  3. Patient able to stand for 20 minute to perform household/daily activities with pain no more than 0-2/10 - ALMOST MET  Discharge Goals: Time Frame: 12 weeks  1. Improve LE and core stability strength to 5/5 to perform ADLs - ONGOING  2. Patient able to ambulate for 30 minutes without onset of low back pain. - ONGOING  3. Patient demonstrates ability to perform balance activities for 2 minutes without loss of balance. - ONGOING  4. Improve LEFS score from 52/80 to 62/80 to perform ADLs - ALMOST MET  Rehabilitation Potential For Stated Goals: Excellent  Regarding Vale Go's therapy, I certify that the treatment plan above will be carried out by a therapist or under their direction. Thank you for this referral,  Celso Phillip, PT     Referring Physician Signature: Floyd Hauser., *              Date                    The information in this section was collected on 3/7/2017 (except where otherwise noted). HISTORY:   History of Present Injury/Illness (Reason for Referral):  Chronic history of low back pain and mid-thoracic spine pain. Notes increased discomfort and tightness through low back, left greater than right and into left hip, especially with weight bearing and walking. Patient notes he has had both of his hips replaced and has had continued challenges with his left hip.  He is an avid golfer, however has been limited secondary to his back pain. Patient denies dizziness, drop attacks, numbness/tingling, bowel/bladder dysfunction and/or unexplained weight gain/loss. Past Medical History/Comorbidities:   Mr. Rogelio Sue  has a past medical history of Abnormal cardiovascular function study (3/18/2016); Arthritis; Coronary atherosclerosis of native coronary vessel (3/18/2016); Coronary atherosclerosis of native coronary vessel (3/18/2016); Dyspnea on exertion; HLD (hyperlipidemia) (3/18/2016); Hypertension; Seizures ([facility] [address] and Upper respiratory infection. Mr. Rogelio Sue  has a past surgical history that includes orthopaedic and other surgical. bilateral total hip replacements. Social History/Living Environment:     Lives at independent living facility [facility], with his wife. No stairs in home. Prior Level of Function/Work/Activity:  Independent with tasks, however challenged with endurance and performance of activities secondary to pain levels. 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 Works out 1-2x a week, attempts to walk 4-5x a week. Dominant Side:         LEFT  Other Clinical Tests:          X-rays- arthritic changes in lumbar spine and hips. Current Medications:       Current Outpatient Prescriptions:     atenolol (TENORMIN) 25 mg tablet, Take 1 Tab by mouth daily. , Disp: 90 Tab, Rfl: 3    atorvastatin (LIPITOR) 40 mg tablet, Take 1 Tab by mouth daily. , Disp: 30 Tab, Rfl: 5    fluticasone (FLONASE) 50 mcg/actuation nasal spray, , Disp: , Rfl:     donepezil (ARICEPT) 5 mg tablet, , Disp: , Rfl:     lisinopril-hydrochlorothiazide (PRINZIDE, ZESTORETIC) 20-12.5 mg per tablet, , Disp: , Rfl:     tamsulosin (FLOMAX) 0.4 mg capsule, , Disp: , Rfl:     traZODone (DESYREL) 100 mg tablet, , Disp: , Rfl:     rosuvastatin (CRESTOR) 20 mg tablet, take 20 mg by mouth daily. , Disp: , Rfl:     olmesartan-hydrochlorothiazide (BENICAR HCT) 40-12.5 mg per tablet, take 1 Tab by mouth two (2) times a day., Disp: , Rfl:     NAPROXEN SODIUM (ALEVE PO), take  by mouth daily as needed. , Disp: , Rfl:    Date Last Reviewed:  5/9/2017     Number of Personal Factors/Comorbidities that affect the Plan of Care: 0: LOW COMPLEXITY   EXAMINATION:   Observation/Orthostatic Postural Assessment:          Patient denies any LE pain, paresthesia or symptoms. Patient denies any increase of symptoms with cough, sneeze or valsalva. Patient denies any saddle paresthesia or bowel/bladder deficits. Patient exhibits a decreased lumbar lordosis and increased thoracic kyphosis. Lower extremity weight bearing is symmetrical. Shoulder symmetry exhibits right elevated. Observation of gait indicates decreased pelvic mobility (patient is a hip walker versus an efficient trunk walker). Lower quadrant bony landmarks are right iliac crest elevated compared to left, level greater trochanters in standing. Supine left malleolus superior compared to right Leg swing for innominate mobility indicates significant bilateral limitations in extension. Vertical compression test (VCT) for alignment is 2. Elbow flexion test (EFT) for motor activation is 2+. Soft tissue observation indicates restrictions through bilateral iliopsoas, rectus femoris left greater than right, lumbar and thoracic spine paraspinals, left greater than right piriformis and hip rotators. Improving. Restrictions noted in left hip adductors. Palpation: Myofascial assessment indicates restriction through mid-thoracic to lumbar spine superficial fascia. Muscle length testing in modified Berry position exhibits restricted left greater than right improving. Hamstring flexibility tested supine with straight leg raise (SLR) is restricted bilaterally, left 55, right 60 degrees. Piriformis length is restricted left greater than right. improving Quadriceps flexibility tested prone with heel to buttock is restricted bilaterally. improving Ely test is positive for rectus femoris tightness improving.  Gastrocnemius and soleus flexibility are restricted, mild left greater than right. Sacrotuberous ligament is not tested today. Sacrococcygeal junction exhibits not tested today. Body of coccyx is not tested today. ROM: Passive trunk rotation is 75% available to the R and 80% available to the L. Kinetic testing in standing including forward bend test is positive left. Marcher's test is positive lefy. Pelvic shear test is standing exhibits decreased excursion of bilateral shear with a hard end feel. Single plane active movements through lumbar spine in standing exhibit limited in all motions: flexion to mid-shin, extension limited by 50%. Functional squat for LE clearance is challenged with posterior translation of hip/pelvis. Lumbar positional testing at transverse processes indicates FRS right L4-5 with limitations in extension, rotation and side bending left, moderate restrictions. Improving Sacral positional testing indicates mild right on right forward sacral torsion. Seated forward flexion test is positive left. Prone knee active flexion test is positive left. Spring testing of lumbar spine exhibits decreased p/a springs through L2-5. Spring testing of sacrum exhibits decreased p/a spring of left sacral base. Spring testing of innominates exhibits decreased left innominate spring. improving  AROM (PROM) Right Left   Hip flexion/Innominate flexion 90 85   Hip extension/Innominate extension 5 5   Hip external rotation (ER) 20 15   Hip internal rotation (IR) 10 10   Hip abduction 40 40     Strength: Lumbar protective mechanism (LPM) are sluggish for initiation.    LPM Strength */5   R anterior to posterior diagonal 2   L anterior to posterior diagonal 2   R posterior to anterior diagonal 2   L posterior to anterior diagonal 2     Manual Muscle Test (*/5) Right Left   Knee extension 4 4   Knee flexion 4+ 4+   Hip flexion 4 4-   Hip ER 4 4-   Hip IR 4 4-   Hip extension 3+ 3+   Hip abduction 4- 3+   Hip adduction 5 5   Ankle DF 5 5   Ankle PF 5 5   Core stability 3+/5     Special Tests:  Cordella Knuckles test is negative. Scours test is negative  Neurological Screen:  Myotomes: Key muscle strength testing through bilateral LE is intact. Dermatomes: Sensation testing through bilateral lower quadrants for light touch is intact. Reflexes: Patellar (L4) and Achilles (S1) are not tested*. Neural tension tests: Passive straight leg raise (SLR) test is negative. Slump test is negative. Functional Mobility:  Challenged with squatting, prolonged standing, balance and ambulation. Body Structures Involved:  1. Nerves  2. Bones  3. Joints  4. Muscles Body Functions Affected:  1. Sensory/Pain  2. Neuromusculoskeletal  3. Movement Related Activities and Participation Affected:  1. Mobility  2. Community, Social and [place]   Number of elements (examined above) that affect the Plan of Care: 3: MODERATE COMPLEXITY   CLINICAL PRESENTATION:   Presentation: Evolving clinical presentation with changing clinical characteristics: MODERATE COMPLEXITY   CLINICAL DECISION MAKING:   Outcome Measure: Tool Used: Lower Extremity Functional Scale (LEFS)  Score:  Initial: 52/80 Most Recent: 60/80 (Date: 5/2/17 )   Interpretation of Score: 20 questions each scored on a 5 point scale with 0 representing \"extreme difficulty or unable to perform\" and 4 representing \"no difficulty\". The lower the score, the greater the functional disability. 80/80 represents no disability. Minimal detectable change is 9 points. Score 80 79-63 62-48 47-32 31-16 15-1 0   Modifier CH CI CJ CK CL CM CN     ?  Mobility - Walking and Moving Around:     - CURRENT STATUS: CJ - 20%-39% impaired, limited or restricted    - GOAL STATUS: CI - 1%-19% impaired, limited or restricted    - D/C STATUS:  ---------------To be determined---------------    Medical Necessity:   · Patient is expected to demonstrate progress in strength, range of motion, balance and functional technique to increase independence with ADLs, prolonged standing and walking techniques. .  Reason for Services/Other Comments:  · Patient continues to require skilled intervention due to challenged with endurance and pain levels in weight bearing positions. Use of outcome tool(s) and clinical judgement create a POC that gives a: Questionable prediction of patient's progress: MODERATE COMPLEXITY            TREATMENT:   (In addition to Assessment/Re-Assessment sessions the following treatments were rendered)    Pre-treatment Symptoms/Complaints:  Patient had his  come with him today to review specific exercises to work on at the gym. Notes overall improvement in mobility and flexibility. Pain: Initial:   Pain Intensity 1: 1  Pain Location 1: Spine, lumbar  Pain Orientation 1: Posterior  Post Session:  0/10      Therapeutic Exercise: (30 Minutes):  Exercises per grid below to improve mobility, strength, balance and coordination. Required minimal visual and verbal cues to promote proper body alignment, promote proper body posture and promote proper body mechanics. Progressed resistance, range, repetitions and complexity of movement as indicated. Date:  5/9/2017   Activity/Exercise Parameters   Single knee to chest X 5 reps, 10 sec holds   Supine hamstrings stretch X 5 reps, 20 sec holds, BLE   Supine lower trunk rotation X 10 reps BLE  X 10 reps bilaterally with manual resistance and traction for activation of multifidi    Supine abdominal brace 90/90 X 10 reps, 5 sec holds, single leg   Supine hip adductor stretch Review technique   Golf stretches    90/90 hold abdominal bracing Review techniques   Side lying hip abduction Review technique   Standing hip exercises X 10 reps hip flexion, abduction and extension on firm foam   Patient education X 10 minute review of all exercises with , exercises to avoid.          Manual Therapy (    Soft Tissue Mobilization Duration  Duration: 30 Minutes): Manual techniques to facilitate improved motion and decreased pain. · Supine bilateral iliopsoas release with FMP of lower trunk rotation, supine left hip adductor soft tissue mobilization with FMP of knee extension. · Prone soft tissue mobilization to bilateral thoracic and lumbar spine paraspinals, with strumming techniques and release around bony contours left greater than right of iliac       Crest, sacral sulcus and lower border of rib cage. · Prone bilateral hip on axis mobilization with manual resistance into hip IR. · Prone bilateral knee flexion/rectus femoris stretches, contract/relax techniques. (Used abbreviations: MET - muscle energy technique; PNF - proprioceptive neuromuscular facilitation; NMR - neuromuscular re-education; a/p - anterior to posterior; p/a - posterior to anterior, FMP - functional movement patterns)    Treatment/Session Assessment:    · Response to Treatment:  Improved awareness of core stability and importance of hip extension strengthening over hamstrings strengthening. · Compliance with Program/Exercises: compliant all of the time. · Recommendations/Intent for next treatment session: \"Next visit will focus on advancements to more challenging activities\".   Total Treatment Duration:  PT Patient Time In/Time Out  Time In: 1330  Time Out: 15[facility] Rena Morales PT

## 2017-05-17 ENCOUNTER — HOSPITAL ENCOUNTER (OUTPATIENT)
Dept: PHYSICAL THERAPY | Age: 82
End: 2017-05-17
Payer: MEDICARE

## 2017-05-23 ENCOUNTER — HOSPITAL ENCOUNTER (OUTPATIENT)
Dept: PHYSICAL THERAPY | Age: 82
Discharge: HOME | End: 2017-05-23
Payer: MEDICARE

## 2017-05-23 PROCEDURE — 97110 THERAPEUTIC EXERCISES: CPT

## 2017-05-23 PROCEDURE — 97140 MANUAL THERAPY 1/> REGIONS: CPT

## 2017-05-23 PROCEDURE — G8980 MOBILITY D/C STATUS: HCPCS

## 2017-05-23 PROCEDURE — G8979 MOBILITY GOAL STATUS: HCPCS

## 2017-05-23 NOTE — PROGRESS NOTES
Domo Dupont  : [facility] [address]  [phone]   [phone]        OUTPATIENT PHYSICAL THERAPY:Daily Note and Discharge 2017    ICD-10: Treatment Diagnosis: low back pain M54.5  ICD-10: Treatment Diagnosis: stiffness in joint, right hip M25.651  ICD-10: Treatment Diagnosis: stiffness in joint, left hip M25.652  Precautions/Allergies:   Review of patient's allergies indicates no known allergies. Fall Risk Score: 2 (? 5 = High Risk)  MD Orders: evaluate and treat MEDICAL/REFERRING DIAGNOSIS:  Back pain [M54.9]   DATE OF ONSET: chronic  REFERRING PHYSICIAN: Elsy Ribera, *  RETURN PHYSICIAN APPOINTMENT: as needed     INITIAL ASSESSMENT:  Mr. Stone is a 81 y.o. male who presents to physical therapy with chronic low back, bilateral hip (left greater than right) stiffness and mid-thoracic spine discomfort. He presents with soft tissue restrictions, arthrokinematic dysfunctions in thoracic/lumbar spine and pelvis, postural deficits, strength and endurance deficits. Patient would benefit from skilled physical therapy to improve overall mobility and function. 17: Patient demonstrates improve mobility overall in thoracic and lumbar spine, improving endurance with ambulation. Continues to be challenged with core and hip/gluteal strengthening/endurance, especially in weight bearing. He would benefit from continued therapy services to improve remaining strength and endurance deficits as well as soft tissue restrictions. 17: Patient demonstrates improved mobility, improved flexibility and increased strength through LE, hips and core stability. He will continue to work weekly with his . He has met his physical therapy goals at this time and will be discharged from therapy at this time. TREATMENT PLAN:  Effective Dates: 3/7/2017 TO 2017.     GOALS: (Goals have been discussed and agreed upon with patient.)  Short-Term Functional Goals: Time Frame: 4 weeks  1. Patient demonstrates independence with his HEP without verbal cueing from therapist. GOAL MET  2. Patient able to ambulate for 15 minutes without onset of low back pain. GOAL MET  3. Patient able to stand for 20 minute to perform household/daily activities with pain no more than 0-2/10 - GOAL MET  Discharge Goals: Time Frame: 12 weeks  1. Improve LE and core stability strength to 5/5 to perform ADLs - ALMOST MET  2. Patient able to ambulate for 30 minutes without onset of low back pain. - GOAL MET  3. Patient demonstrates ability to perform balance activities for 2 minutes without loss of balance. - ALMOST MET  4. Improve LEFS score from 52/80 to 62/80 to perform ADLs - GOAL MET  Rehabilitation Potential For Stated Goals: Excellent  Regarding Luli Galvez 's therapy, I certify that the treatment plan above will be carried out by a therapist or under their direction. Thank you for this referral,  Milli Mariscal PT                 The information in this section was collected on 3/7/2017 (except where otherwise noted). HISTORY:   History of Present Injury/Illness (Reason for Referral):  Chronic history of low back pain and mid-thoracic spine pain. Notes increased discomfort and tightness through low back, left greater than right and into left hip, especially with weight bearing and walking. Patient notes he has had both of his hips replaced and has had continued challenges with his left hip. He is an avid golfer, however has been limited secondary to his back pain. Patient denies dizziness, drop attacks, numbness/tingling, bowel/bladder dysfunction and/or unexplained weight gain/loss. Past Medical History/Comorbidities:   Mr. Sowmya Bolanos  has a past medical history of Abnormal cardiovascular function study (3/18/2016); Arthritis; Coronary atherosclerosis of native coronary vessel (3/18/2016);  Coronary atherosclerosis of native coronary vessel (3/18/2016); Dyspnea on exertion; HLD (hyperlipidemia) (3/18/2016); Hypertension; Seizures ([address] and Upper respiratory infection. Mr. Sam Mobley  has a past surgical history that includes orthopaedic and other surgical. bilateral total hip replacements. Social History/Living Environment:     Lives at independent living facility [facility], with his wife. No stairs in home. Prior Level of Function/Work/Activity:  Independent with tasks, however challenged with endurance and performance of activities secondary to pain levels. 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 Works out 1-2x a week, attempts to walk 4-5x a week. Dominant Side:         LEFT  Other Clinical Tests:          X-rays- arthritic changes in lumbar spine and hips. Current Medications:       Current Outpatient Prescriptions:     atenolol (TENORMIN) 25 mg tablet, Take 1 Tab by mouth daily. , Disp: 90 Tab, Rfl: 3    atorvastatin (LIPITOR) 40 mg tablet, Take 1 Tab by mouth daily. , Disp: 30 Tab, Rfl: 5    fluticasone (FLONASE) 50 mcg/actuation nasal spray, , Disp: , Rfl:     donepezil (ARICEPT) 5 mg tablet, , Disp: , Rfl:     lisinopril-hydrochlorothiazide (PRINZIDE, ZESTORETIC) 20-12.5 mg per tablet, , Disp: , Rfl:     tamsulosin (FLOMAX) 0.4 mg capsule, , Disp: , Rfl:     traZODone (DESYREL) 100 mg tablet, , Disp: , Rfl:     rosuvastatin (CRESTOR) 20 mg tablet, take 20 mg by mouth daily. , Disp: , Rfl:     olmesartan-hydrochlorothiazide (BENICAR HCT) 40-12.5 mg per tablet, take 1 Tab by mouth two (2) times a day., Disp: , Rfl:     NAPROXEN SODIUM (ALEVE PO), take  by mouth daily as needed. , Disp: , Rfl:    Date Last Reviewed:  5/23/2017     Number of Personal Factors/Comorbidities that affect the Plan of Care: 0: LOW COMPLEXITY   EXAMINATION:   Observation/Orthostatic Postural Assessment:          Patient denies any LE pain, paresthesia or symptoms.  Patient denies any increase of symptoms with cough, sneeze or valsalva. Patient denies any saddle paresthesia or bowel/bladder deficits. Patient exhibits a decreased lumbar lordosis and increased thoracic kyphosis. Lower extremity weight bearing is symmetrical. Shoulder symmetry exhibits right elevated. Observation of gait indicates decreased pelvic mobility (patient is a hip walker versus an efficient trunk walker). Lower quadrant bony landmarks are right iliac crest elevated compared to left, level greater trochanters in standing. Supine left malleolus superior compared to right Leg swing for innominate mobility indicates significant bilateral limitations in extension. Vertical compression test (VCT) for alignment is 2. Elbow flexion test (EFT) for motor activation is 2+. Soft tissue observation indicates restrictions through bilateral iliopsoas, rectus femoris left greater than right, lumbar and thoracic spine paraspinals, left greater than right piriformis and hip rotators. Improving. Restrictions noted in left hip adductors. - improvements noted    Palpation: Myofascial assessment indicates restriction through mid-thoracic to lumbar spine superficial fascia. Muscle length testing in modified Berry position exhibits restricted left greater than right improving. Hamstring flexibility tested supine with straight leg raise (SLR) is restricted bilaterally, left 55, right 60 degrees. Piriformis length is restricted left greater than right. improving Quadriceps flexibility tested prone with heel to buttock is restricted bilaterally. improving Ely test is positive for rectus femoris tightness improving. Gastrocnemius and soleus flexibility are restricted, mild left greater than right. Sacrotuberous ligament is not tested today. Sacrococcygeal junction exhibits not tested today. Body of coccyx is not tested today.     ROM: Passive trunk rotation is 85% available to the R and 85% available to the L. Kinetic testing in standing including forward bend test is positive left. Marcher's test is positive lefy. Pelvic shear test is standing exhibits decreased excursion of bilateral shear with a hard end feel. Single plane active movements through lumbar spine in standing exhibit limited in all motions: flexion to mid-shin, extension limited by 50%. Functional squat for LE clearance is challenged with posterior translation of hip/pelvis. Lumbar positional testing at transverse processes indicates FRS right L4-5 with limitations in extension, rotation and side bending left, moderate restrictions. Improving Sacral positional testing indicates mild right on right forward sacral torsion. Seated forward flexion test is positive left. Prone knee active flexion test is positive left. Spring testing of lumbar spine exhibits decreased p/a springs through L2-5. Spring testing of sacrum exhibits decreased p/a spring of left sacral base. Spring testing of innominates exhibits decreased left innominate spring. improving  AROM (PROM) Right Left   Hip flexion/Innominate flexion 90 90   Hip extension/Innominate extension 5 5   Hip external rotation (ER) 20 20   Hip internal rotation (IR) 15 15   Hip abduction 40 40     Strength: Lumbar protective mechanism (LPM) are sluggish for initiation. LPM Strength */5   R anterior to posterior diagonal 3   L anterior to posterior diagonal 3   R posterior to anterior diagonal 3+   L posterior to anterior diagonal 3+     Manual Muscle Test (*/5) Right Left   Knee extension 4+ 4+   Knee flexion 4+ 4+   Hip flexion 4 4   Hip ER 4 4   Hip IR 4 4   Hip extension 4 4   Hip abduction 4 4   Hip adduction 5 5   Ankle DF 5 5   Ankle PF 5 5   Core stability 4/5     Special Tests:  Beck Dense test is negative. Scours test is negative  Neurological Screen:  Myotomes: Key muscle strength testing through bilateral LE is intact. Dermatomes: Sensation testing through bilateral lower quadrants for light touch is intact.   Reflexes: Patellar (L4) and Achilles (S1) are not tested*. Neural tension tests: Passive straight leg raise (SLR) test is negative. Slump test is negative. Functional Mobility:  Challenged with squatting, prolonged standing, balance and ambulation. Body Structures Involved:  1. Nerves  2. Bones  3. Joints  4. Muscles Body Functions Affected:  1. Sensory/Pain  2. Neuromusculoskeletal  3. Movement Related Activities and Participation Affected:  1. Mobility  2. Community, Social and [place]   Number of elements (examined above) that affect the Plan of Care: 3: MODERATE COMPLEXITY   CLINICAL PRESENTATION:   Presentation: Evolving clinical presentation with changing clinical characteristics: MODERATE COMPLEXITY   CLINICAL DECISION MAKING:   Outcome Measure: Tool Used: Lower Extremity Functional Scale (LEFS)  Score:  Initial: 52/80 Most Recent: 60/80 (Date: 5/2/17 )                        70/80 (Date: 5/23/17)   Interpretation of Score: 20 questions each scored on a 5 point scale with 0 representing \"extreme difficulty or unable to perform\" and 4 representing \"no difficulty\". The lower the score, the greater the functional disability. 80/80 represents no disability. Minimal detectable change is 9 points. Score 80 79-63 62-48 47-32 31-16 15-1 0   Modifier CH CI CJ CK CL CM CN     ? Mobility - Walking and Moving Around:     - CURRENT STATUS: CI - 1%-19% impaired, limited or restricted    - GOAL STATUS: CI - 1%-19% impaired, limited or restricted    - D/C STATUS:  CI - 1%-19% impaired, limited or restricted    Medical Necessity:   · Patient is expected to demonstrate progress in strength, range of motion, balance and functional technique to increase independence with ADLs, prolonged standing and walking techniques. .  Reason for Services/Other Comments:  · Patient continues to require skilled intervention due to challenged with endurance and pain levels in weight bearing positions.    Use of outcome tool(s) and clinical judgement create a POC that gives a: Questionable prediction of patient's progress: MODERATE COMPLEXITY            TREATMENT:   (In addition to Assessment/Re-Assessment sessions the following treatments were rendered)    Pre-treatment Symptoms/Complaints:  Patient notes improvements overall and feels confident to continue to work independently with his . Pain: Initial:   Pain Intensity 1: 1  Pain Location 1: Spine, lumbar  Pain Orientation 1: Posterior  Post Session:  0/10      Therapeutic Exercise: (25 Minutes):  Exercises per grid below to improve mobility, strength, balance and coordination. Required minimal visual and verbal cues to promote proper body alignment, promote proper body posture and promote proper body mechanics. Progressed resistance, range, repetitions and complexity of movement as indicated. Date:  5/23/2017   Activity/Exercise Parameters   Single knee to chest X 5 reps, 10 sec holds   Supine hamstrings stretch X 5 reps, 20 sec holds, BLE   Supine lower trunk rotation X 10 reps BLE  X 10 reps bilaterally with manual resistance and traction for activation of multifidi    Supine abdominal brace 90/90 X 10 reps, 5 sec holds, single leg   Supine hip adductor stretch Review technique   Golf stretches    90/90 hold abdominal bracing Review techniques   Side lying hip abduction Review technique   Standing hip exercises X 10 reps hip flexion, abduction and extension on firm foam   Patient education X 5 minute review of all exercises, print outs. Manual Therapy (    Soft Tissue Mobilization Duration  Duration: 30 Minutes): Manual techniques to facilitate improved motion and decreased pain. · Supine bilateral iliopsoas release with FMP of lower trunk rotation, supine left hip adductor soft tissue mobilization with FMP of knee extension.    · Prone soft tissue mobilization to bilateral thoracic and lumbar spine paraspinals, with strumming techniques and release around bony contours left greater than right of iliac       Crest, sacral sulcus and lower border of rib cage. · Prone bilateral hip on axis mobilization with manual resistance into hip IR. · Prone bilateral knee flexion/rectus femoris stretches, contract/relax techniques. (Used abbreviations: MET - muscle energy technique; PNF - proprioceptive neuromuscular facilitation; NMR - neuromuscular re-education; a/p - anterior to posterior; p/a - posterior to anterior, FMP - functional movement patterns)    Treatment/Session Assessment:    · Response to Treatment:  Improved overall mobility in lumbar spine and pelvis. Decreased soft tissue restrictions in anterior pelvis with improved upright posture at end of session. Demonstrates compliance with his exercises. · Compliance with Program/Exercises: compliant all of the time.   Total Treatment Duration:  PT Patient Time In/Time Out  Time In: 0730  Time Out: Jason Aparicio PT

## 2017-05-31 ENCOUNTER — APPOINTMENT (OUTPATIENT)
Dept: PHYSICAL THERAPY | Age: 82
End: 2017-05-31
Payer: MEDICARE

## 2017-06-06 ENCOUNTER — APPOINTMENT (OUTPATIENT)
Dept: PHYSICAL THERAPY | Age: 82
End: 2017-06-06

## 2017-10-23 ENCOUNTER — APPOINTMENT (RX ONLY)
Dept: URBAN - METROPOLITAN AREA CLINIC 349 | Facility: CLINIC | Age: 82
Setting detail: DERMATOLOGY
End: 2017-10-23

## 2017-10-23 DIAGNOSIS — F42.4 EXCORIATION (SKIN-PICKING) DISORDER: ICD-10-CM

## 2017-10-23 DIAGNOSIS — L85.3 XEROSIS CUTIS: ICD-10-CM

## 2017-10-23 DIAGNOSIS — L57.0 ACTINIC KERATOSIS: ICD-10-CM | Status: RESOLVED

## 2017-10-23 DIAGNOSIS — D22 MELANOCYTIC NEVI: ICD-10-CM | Status: STABLE

## 2017-10-23 DIAGNOSIS — Z85.828 PERSONAL HISTORY OF OTHER MALIGNANT NEOPLASM OF SKIN: ICD-10-CM

## 2017-10-23 PROBLEM — D22.71 MELANOCYTIC NEVI OF RIGHT LOWER LIMB, INCLUDING HIP: Status: ACTIVE | Noted: 2017-10-23

## 2017-10-23 PROBLEM — S50.912A UNSPECIFIED SUPERFICIAL INJURY OF LEFT FOREARM, INITIAL ENCOUNTER: Status: ACTIVE | Noted: 2017-10-23

## 2017-10-23 PROBLEM — D22.5 MELANOCYTIC NEVI OF TRUNK: Status: ACTIVE | Noted: 2017-10-23

## 2017-10-23 PROBLEM — D22.62 MELANOCYTIC NEVI OF LEFT UPPER LIMB, INCLUDING SHOULDER: Status: ACTIVE | Noted: 2017-10-23

## 2017-10-23 PROBLEM — D22.61 MELANOCYTIC NEVI OF RIGHT UPPER LIMB, INCLUDING SHOULDER: Status: ACTIVE | Noted: 2017-10-23

## 2017-10-23 PROBLEM — E78.5 HYPERLIPIDEMIA, UNSPECIFIED: Status: ACTIVE | Noted: 2017-10-23

## 2017-10-23 PROBLEM — D22.72 MELANOCYTIC NEVI OF LEFT LOWER LIMB, INCLUDING HIP: Status: ACTIVE | Noted: 2017-10-23

## 2017-10-23 PROBLEM — M12.9 ARTHROPATHY, UNSPECIFIED: Status: ACTIVE | Noted: 2017-10-23

## 2017-10-23 PROBLEM — I10 ESSENTIAL (PRIMARY) HYPERTENSION: Status: ACTIVE | Noted: 2017-10-23

## 2017-10-23 PROCEDURE — ? OTHER

## 2017-10-23 PROCEDURE — 99214 OFFICE O/P EST MOD 30 MIN: CPT | Mod: 25

## 2017-10-23 PROCEDURE — ? COUNSELING

## 2017-10-23 PROCEDURE — ? TREATMENT REGIMEN

## 2017-10-23 PROCEDURE — ? LIQUID NITROGEN

## 2017-10-23 PROCEDURE — 17004 DESTROY PREMAL LESIONS 15/>: CPT

## 2017-10-23 ASSESSMENT — LOCATION SIMPLE DESCRIPTION DERM
LOCATION SIMPLE: LEFT TEMPLE
LOCATION SIMPLE: RIGHT HAND
LOCATION SIMPLE: LEFT UPPER ARM
LOCATION SIMPLE: RIGHT UPPER ARM
LOCATION SIMPLE: LEFT CALF
LOCATION SIMPLE: LEFT PRETIBIAL REGION
LOCATION SIMPLE: RIGHT EAR
LOCATION SIMPLE: RIGHT FOREARM
LOCATION SIMPLE: RIGHT LOWER BACK
LOCATION SIMPLE: LEFT EAR
LOCATION SIMPLE: LEFT HAND
LOCATION SIMPLE: RIGHT FOREHEAD
LOCATION SIMPLE: LEFT POSTERIOR THIGH
LOCATION SIMPLE: LEFT ELBOW
LOCATION SIMPLE: RIGHT TEMPLE
LOCATION SIMPLE: LEFT ANKLE
LOCATION SIMPLE: LEFT FOREARM
LOCATION SIMPLE: RIGHT ZYGOMA
LOCATION SIMPLE: RIGHT POSTERIOR THIGH
LOCATION SIMPLE: LEFT FOREHEAD
LOCATION SIMPLE: RIGHT PRETIBIAL REGION

## 2017-10-23 ASSESSMENT — LOCATION DETAILED DESCRIPTION DERM
LOCATION DETAILED: RIGHT DISTAL POSTERIOR THIGH
LOCATION DETAILED: LEFT SUPERIOR HELIX
LOCATION DETAILED: LEFT SUPERIOR FOREHEAD
LOCATION DETAILED: RIGHT SUPERIOR HELIX
LOCATION DETAILED: LEFT INFERIOR FOREHEAD
LOCATION DETAILED: RIGHT SUPERIOR FOREHEAD
LOCATION DETAILED: LEFT PROXIMAL DORSAL FOREARM
LOCATION DETAILED: LEFT POSTERIOR ANKLE
LOCATION DETAILED: RIGHT RADIAL DORSAL HAND
LOCATION DETAILED: LEFT PROXIMAL POSTERIOR UPPER ARM
LOCATION DETAILED: LEFT DISTAL CALF
LOCATION DETAILED: LEFT RADIAL DORSAL HAND
LOCATION DETAILED: RIGHT DISTAL PRETIBIAL REGION
LOCATION DETAILED: LEFT DISTAL LATERAL POSTERIOR THIGH
LOCATION DETAILED: RIGHT CENTRAL TEMPLE
LOCATION DETAILED: RIGHT PROXIMAL POSTERIOR UPPER ARM
LOCATION DETAILED: RIGHT MEDIAL ZYGOMA
LOCATION DETAILED: RIGHT DISTAL DORSAL FOREARM
LOCATION DETAILED: LEFT PROXIMAL PRETIBIAL REGION
LOCATION DETAILED: LEFT CENTRAL TEMPLE
LOCATION DETAILED: LEFT DISTAL PRETIBIAL REGION
LOCATION DETAILED: LEFT DISTAL DORSAL FOREARM
LOCATION DETAILED: RIGHT SUPERIOR LATERAL LOWER BACK
LOCATION DETAILED: RIGHT PROXIMAL PRETIBIAL REGION
LOCATION DETAILED: RIGHT PROXIMAL DORSAL FOREARM
LOCATION DETAILED: LEFT ELBOW
LOCATION DETAILED: LEFT INFERIOR HELIX

## 2017-10-23 ASSESSMENT — LOCATION ZONE DERM
LOCATION ZONE: EAR
LOCATION ZONE: TRUNK
LOCATION ZONE: ARM
LOCATION ZONE: HAND
LOCATION ZONE: LEG
LOCATION ZONE: FACE

## 2017-10-23 ASSESSMENT — PAIN INTENSITY VAS: HOW INTENSE IS YOUR PAIN 0 BEING NO PAIN, 10 BEING THE MOST SEVERE PAIN POSSIBLE?: NO PAIN

## 2017-10-23 NOTE — PROCEDURE: OTHER
Note Text (......Xxx Chief Complaint.): This diagnosis correlates with the
Detail Level: Detailed
Other (Free Text): Resolved with cryo from previous visit.

## 2017-10-23 NOTE — PROCEDURE: LIQUID NITROGEN
Detail Level: Detailed
Post-Care Instructions: I reviewed with the patient in detail post-care instructions. Patient is to wear sunprotection, and avoid picking at any of the treated lesions. Pt may apply Vaseline to crusted or scabbing areas.
Number Of Freeze-Thaw Cycles: 2 freeze-thaw cycles
Duration Of Freeze Thaw-Cycle (Seconds): 3
Consent: The patient's consent was obtained including but not limited to risks of crusting, scabbing, blistering, scarring, darker or lighter pigmentary change, recurrence, incomplete removal and infection.
Render Post-Care Instructions In Note?: no

## 2018-02-18 ENCOUNTER — APPOINTMENT (OUTPATIENT)
Dept: GENERAL RADIOLOGY | Age: 83
End: 2018-02-18
Attending: EMERGENCY MEDICINE
Payer: MEDICARE

## 2018-02-18 ENCOUNTER — APPOINTMENT (OUTPATIENT)
Dept: CT IMAGING | Age: 83
End: 2018-02-18
Attending: EMERGENCY MEDICINE
Payer: MEDICARE

## 2018-02-18 ENCOUNTER — HOSPITAL ENCOUNTER (EMERGENCY)
Age: 83
Discharge: HOME | End: 2018-02-18
Attending: EMERGENCY MEDICINE
Payer: MEDICARE

## 2018-02-18 VITALS
OXYGEN SATURATION: 100 % | RESPIRATION RATE: 18 BRPM | BODY MASS INDEX: 29.03 KG/M2 | HEART RATE: 83 BPM | HEIGHT: 67 IN | DIASTOLIC BLOOD PRESSURE: 72 MMHG | WEIGHT: 185 LBS | TEMPERATURE: 98.2 F | SYSTOLIC BLOOD PRESSURE: 113 MMHG

## 2018-02-18 DIAGNOSIS — S09.90XA CLOSED HEAD INJURY, INITIAL ENCOUNTER: Primary | ICD-10-CM

## 2018-02-18 DIAGNOSIS — S00.81XA ABRASION OF FACE, INITIAL ENCOUNTER: ICD-10-CM

## 2018-02-18 DIAGNOSIS — S60.519A ABRASION OF HAND, UNSPECIFIED LATERALITY, INITIAL ENCOUNTER: ICD-10-CM

## 2018-02-18 PROCEDURE — 90471 IMMUNIZATION ADMIN: CPT | Performed by: EMERGENCY MEDICINE

## 2018-02-18 PROCEDURE — 70450 CT HEAD/BRAIN W/O DYE: CPT

## 2018-02-18 PROCEDURE — 90715 TDAP VACCINE 7 YRS/> IM: CPT | Performed by: EMERGENCY MEDICINE

## 2018-02-18 PROCEDURE — 73130 X-RAY EXAM OF HAND: CPT

## 2018-02-18 PROCEDURE — 70486 CT MAXILLOFACIAL W/O DYE: CPT

## 2018-02-18 PROCEDURE — 74011250636 HC RX REV CODE- 250/636: Performed by: EMERGENCY MEDICINE

## 2018-02-18 PROCEDURE — 99281 EMR DPT VST MAYX REQ PHY/QHP: CPT | Performed by: EMERGENCY MEDICINE

## 2018-02-18 RX ADMIN — TETANUS TOXOID, REDUCED DIPHTHERIA TOXOID AND ACELLULAR PERTUSSIS VACCINE, ADSORBED 0.5 ML: 5; 2.5; 8; 8; 2.5 SUSPENSION INTRAMUSCULAR at 22:26

## 2018-02-19 NOTE — ED TRIAGE NOTES
Pt presents to ER after falling at a local grocery store, EMS called out to scene w/ dressing placed to head. Pt has abrasions to R lateral face and knuckles, oozing at this time, laceration to R brow. Pt denies any LOC, non-dizzy, pain minimal per pt, no known blood thinners. Pt resides at the [facility].

## 2018-02-19 NOTE — DISCHARGE INSTRUCTIONS
Scrapes (Abrasions): Care Instructions  Your Care Instructions  Scrapes (abrasions) are wounds where your skin has been rubbed or torn off. Most scrapes do not go deep into the skin, but some may remove several layers of skin. Scrapes usually don't bleed much, but they may ooze pinkish fluid. Scrapes on the head or face may appear worse than they are. They may bleed a lot because of the good blood supply to this area. Most scrapes heal well and may not need a bandage. They usually heal within 3 to 7 days. A large, deep scrape may take 1 to 2 weeks or longer to heal. A scab may form on some scrapes. Follow-up care is a key part of your treatment and safety. Be sure to make and go to all appointments, and call your doctor if you are having problems. It's also a good idea to know your test results and keep a list of the medicines you take. How can you care for yourself at home? · If your doctor told you how to care for your wound, follow your doctor's instructions. If you did not get instructions, follow this general advice:  ¨ Wash the scrape with clean water 2 times a day. Don't use hydrogen peroxide or alcohol, which can slow healing. ¨ You may cover the scrape with a thin layer of petroleum jelly, such as Vaseline, and a nonstick bandage. ¨ Apply more petroleum jelly and replace the bandage as needed. · Prop up the injured area on a pillow anytime you sit or lie down during the next 3 days. Try to keep it above the level of your heart. This will help reduce swelling. · Be safe with medicines. Take pain medicines exactly as directed. ¨ If the doctor gave you a prescription medicine for pain, take it as prescribed. ¨ If you are not taking a prescription pain medicine, ask your doctor if you can take an over-the-counter medicine. When should you call for help?   Call your doctor now or seek immediate medical care if:  ? · You have signs of infection, such as:  ¨ Increased pain, swelling, warmth, or redness around the scrape. ¨ Red streaks leading from the scrape. ¨ Pus draining from the scrape. ¨ A fever. ? · The scrape starts to bleed, and blood soaks through the bandage. Oozing small amounts of blood is normal.   ? Watch closely for changes in your health, and be sure to contact your doctor if the scrape is not getting better each day. Where can you learn more? Go to http://shireen-conor.info/. Enter A374 in the search box to learn more about \"Scrapes (Abrasions): Care Instructions. \"  Current as of: March 20, 2017  Content Version: 11.4  © 5299-1450 [employer]. Care instructions adapted under license by [employer] (which disclaims liability or warranty for this information). If you have questions about a medical condition or this instruction, always ask your healthcare professional. [place] [address] any warranty or liability for your use of this information. Learning About a Closed Head Injury  What is a closed head injury? A closed head injury happens when your head gets hit hard. The strong force of the blow causes your brain to shake in your skull. This movement can cause the brain to bruise, swell, or tear. Sometimes nerves or blood vessels also get damaged. This can cause bleeding in or around the brain. A concussion is a type of closed head injury. What are the symptoms? If you have a mild concussion, you may have a mild headache or feel \"not quite right. \" These symptoms are common. They usually go away over a few days to 4 weeks. But sometimes after a concussion, you feel like you can't function as well as before the injury. And you have new symptoms. This is called postconcussive syndrome. You may:  · Find it harder to solve problems, think, concentrate, or remember. · Have headaches. · Have changes in your sleep patterns, such as not being able to sleep or sleeping all the time.   · Have changes in your personality. · Not be interested in your usual activities. · Feel angry or anxious without a clear reason. · Lose your sense of taste or smell. · Be dizzy, lightheaded, or unsteady. It may be hard to stand or walk. How is a closed head injury treated? Any person who may have a concussion needs to see a doctor. Some people have to stay in the hospital to be watched. Others can go home safely. If you go home, follow your doctor's instructions. He or she will tell you if you need someone to watch you closely for the next 24 hours or longer. Rest is the best treatment. Get plenty of sleep at night. And try to rest during the day. · Avoid activities that are physically or mentally demanding. These include housework, exercise, and schoolwork. And don't play video games, send text messages, or use the computer. You may need to change your school or work schedule to be able to avoid these activities. · Ask your doctor when it's okay to drive, ride a bike, or operate machinery. · Take an over-the-counter pain medicine, such as acetaminophen (Tylenol), ibuprofen (Advil, Motrin), or naproxen (Aleve). Be safe with medicines. Read and follow all instructions on the label. · Check with your doctor before you use any other medicines for pain. · Do not drink alcohol or use illegal drugs. They can slow recovery. They can also increase your risk of getting a second head injury. Follow-up care is a key part of your treatment and safety. Be sure to make and go to all appointments, and call your doctor if you are having problems. It's also a good idea to know your test results and keep a list of the medicines you take. Where can you learn more? Go to http://shireen-conor.info/. Enter E235 in the search box to learn more about \"Learning About a Closed Head Injury. \"  Current as of: October 14, 2016  Content Version: 11.4  © 2289-8540 [employer].  Care instructions adapted under license by [employer] (which disclaims liability or warranty for this information). If you have questions about a medical condition or this instruction, always ask your healthcare professional. [address] any warranty or liability for your use of this information. Head Injury: Care Instructions  Your Care Instructions    Most injuries to the head are minor. Bumps, cuts, and scrapes on the head and face usually heal well and can be treated the same as injuries to other parts of the body. Although it's rare, once in a while a more serious problem shows up after you are home. So it's good to be on the lookout for symptoms for a day or two. Follow-up care is a key part of your treatment and safety. Be sure to make and go to all appointments, and call your doctor if you are having problems. It's also a good idea to know your test results and keep a list of the medicines you take. How can you care for yourself at home? · Follow your doctor's instructions. He or she will tell you if you need someone to watch you closely for the next 24 hours or longer. · Take it easy for the next few days or more if you are not feeling well. · Ask your doctor when it's okay for you to go back to activities like driving a car, riding a bike, or operating machinery. When should you call for help? Call 911 anytime you think you may need emergency care. For example, call if:  ? · You have a seizure. ? · You passed out (lost consciousness). ? · You are confused or can't stay awake. ?Call your doctor now or seek immediate medical care if:  ? · You have new or worse vomiting. ? · You feel less alert. ? · You have new weakness or numbness in any part of your body. ? Watch closely for changes in your health, and be sure to contact your doctor if:  ? · You do not get better as expected. ? · You have new symptoms, such as headaches, trouble concentrating, or changes in mood.    Where can you learn more?  Go to http://shireen-conor.info/. Enter K090 in the search box to learn more about \"Head Injury: Care Instructions. \"  Current as of: October 14, 2016  Content Version: 11.4  © 9051-8434 [employer]. Care instructions adapted under license by [employer] (which disclaims liability or warranty for this information). If you have questions about a medical condition or this instruction, always ask your healthcare professional. [address] any warranty or liability for your use of this information. Skin Tears: Care Instructions  Your Care Instructions  As we get older, our skin gets drier and more fragile. Sometimes this can cause the outer layers of skin to split and tear open. Skin tears are treated in different ways. In some cases, doctors use pieces of tape called Steri-Strips to pull the skin together and help it heal. Other times, it's best to leave the tear open and cover it with a special wound-care bandage. Skin tears are usually not serious. They usually heal in a few weeks. But how long you take to heal depends on your body and the type of tear you have. Sometimes the torn piece of skin is used to protect the wound while it heals. But that piece of skin does not heal. It may fall off on its own. Or the doctor may remove it. As your tear heals, it's important to keep it clean to help prevent infection. The doctor has checked you carefully, but problems can develop later. If you notice any problems or new symptoms, get medical treatment right away. Follow-up care is a key part of your treatment and safety. Be sure to make and go to all appointments, and call your doctor if you are having problems. It's also a good idea to know your test results and keep a list of the medicines you take. How can you care for yourself at home?   · If you have pain, ask your doctor if you can take an over-the-counter pain medicine, such as acetaminophen (Tylenol), ibuprofen (Advil, Motrin), or naproxen (Aleve). Be safe with medicines. Read and follow all instructions on the label. · If you have a bandage, follow your doctor's instructions for changing it. · If you have Steri-Strips, leave them on until they fall off. · Follow your doctor's instructions about bathing. · Gently wash the skin tear with plain water 2 times a day. Do not rub the area. · Let the area air dry. Or you can pat it carefully with a soft towel. When should you call for help? Call your doctor now or seek immediate medical care if:  ? · You have signs of infection, such as:  ¨ Increased pain, swelling, warmth, or redness around the tear. ¨ Red streaks leading from the tear. ¨ Pus draining from the tear. ¨ A fever. ? · The tear starts to bleed a lot. Small amounts of blood are normal.   ? Watch closely for changes in your health, and be sure to contact your doctor if:  ? · You do not get better as expected. Where can you learn more? Go to http://shireen-conor.info/. Enter C116 in the search box to learn more about \"Skin Tears: Care Instructions. \"  Current as of: March 20, 2017  Content Version: 11.4  © 6726-0762 [employer]. Care instructions adapted under license by [employer] (which disclaims liability or warranty for this information). If you have questions about a medical condition or this instruction, always ask your healthcare professional. [place] [address] any warranty or liability for your use of this information.

## 2018-02-19 NOTE — ED PROVIDER NOTES
HPI Comments: 80 yo M presents to ED s/p trip and fall at local grocery store just PTA. Pt with abrasions present to right side of face and right knuckles. Denies + LOC, neck pain, back pain, numbness, tingling, weakness,  SOB, CP, abdominal pain, nausea, vomiting, trouble ambulating, extremity pain. Uncertain if Td UTD. Patient is a 81 y.o. male presenting with fall and head injury. The history is provided by the patient. No  was used. Fall   The accident occurred less than 1 hour ago. The fall occurred while walking. He fell from a height of ground level. He landed on concrete. The volume of blood lost was minimal. The point of impact was the head. The pain is at a severity of 1/10. The pain is mild. He was ambulatory at the scene. There was no entrapment after the fall. There was no drug use involved in the accident. There was no alcohol use involved in the accident. Pertinent negatives include no visual change, no fever, no numbness, no abdominal pain, no bowel incontinence, no nausea, no vomiting, no hematuria, no headaches, no extremity weakness, no hearing loss, no loss of consciousness and no tingling. He has tried nothing for the symptoms. The treatment provided no relief. It is unknown when the patient last had a tetanus shot. Head Injury    Pertinent negatives include no numbness, no vomiting and no weakness.         Past Medical History:   Diagnosis Date    Abnormal cardiovascular function study 3/18/2016    Arthritis     Coronary atherosclerosis of native coronary vessel 3/18/2016    Coronary atherosclerosis of native coronary vessel 3/18/2016    Dyspnea on exertion     HLD (hyperlipidemia) 3/18/2016    Hypertension     Seizures (HCC)     GRAN MAL SEIZURE 19 YEARS AGO    Upper respiratory infection        Past Surgical History:   Procedure Laterality Date    HX ORTHOPAEDIC      LEFT HIP    HX OTHER SURGICAL      AV MALFORMATION REPAIR         No family history on file.    Social History     Social History    Marital status:      Spouse name: N/A    Number of children: N/A    Years of education: N/A     Occupational History    Not on file. Social History Main Topics    Smoking status: Former Smoker    Smokeless tobacco: Not on file      Comment: 1978    Alcohol use 7.0 oz/week     14 Glasses of wine per week    Drug use: No    Sexual activity: No     Other Topics Concern    Not on file     Social History Narrative         ALLERGIES: Review of patient's allergies indicates no known allergies. Review of Systems   Constitutional: Negative for chills and fever. HENT: Negative for congestion, facial swelling and sore throat. Eyes: Negative for visual disturbance. Respiratory: Negative for cough and shortness of breath. Cardiovascular: Negative for chest pain, palpitations and leg swelling. Gastrointestinal: Negative for abdominal pain, bowel incontinence, constipation, nausea and vomiting. Genitourinary: Negative for dysuria and hematuria. Musculoskeletal: Negative for back pain, extremity weakness, joint swelling, myalgias and neck pain. Skin: Positive for wound. Negative for pallor. Neurological: Negative for dizziness, tingling, loss of consciousness, weakness, numbness and headaches. Psychiatric/Behavioral: Negative for confusion. Vitals:    02/18/18 2006   BP: 113/72   Pulse: 83   Resp: 18   Temp: 98.2 °F (36.8 °C)   SpO2: 100%   Weight: 83.9 kg (185 lb)   Height: 5' 7\" (1.702 m)            Physical Exam   Constitutional: He is oriented to person, place, and time. He appears well-developed and well-nourished. HENT:   Head: Normocephalic. Head is with abrasion. Head is without raccoon's eyes, without Hensley's sign, without laceration, without right periorbital erythema and without left periorbital erythema.        Right Ear: External ear normal.   Left Ear: External ear normal.   Mouth/Throat: Oropharynx is clear and moist. No oropharyngeal exudate. Superficial abrasions found to R side of face and upper lip. No evidence of dental trauma. No lacerations present. No evidence of basilar skull fracture   Eyes: Conjunctivae and EOM are normal. Pupils are equal, round, and reactive to light. Neck: Normal range of motion. Neck supple. No JVD present. No tracheal deviation present. No midline C-spine tenderness. FROM   Cardiovascular: Normal rate, regular rhythm, normal heart sounds and intact distal pulses. No murmur heard. Radial pulses 2+ and equal    Pulmonary/Chest: Effort normal and breath sounds normal. No respiratory distress. He has no wheezes. He has no rales. He exhibits no tenderness. Abdominal: Soft. There is no tenderness. There is no rebound and no guarding. Musculoskeletal: Normal range of motion. He exhibits no edema, tenderness or deformity. Right hand: He exhibits normal range of motion, no tenderness, no bony tenderness, normal capillary refill, no deformity and no laceration. Hands:  Abrasions present to R knuckles. FROM of R hand. Radial pulses 2+ and equal bilaterally. Strength 5/5 throughout. No midline T-spine or L-spine tenderness to palpation. No deformity. No step-off. Full range of motion of all extremities   Neurological: He is alert and oriented to person, place, and time. No cranial nerve deficit. Coordination normal.   Strength 5/5 throughout. Normal sensory exam. No saddle anesthesia. Normal gait. Skin: Skin is warm and dry. No rash noted. No erythema. Psychiatric: He has a normal mood and affect. His behavior is normal.   Nursing note and vitals reviewed.        MDM  Number of Diagnoses or Management Options  Abrasion of face, initial encounter: new and requires workup  Abrasion of hand, unspecified laterality, initial encounter: new and requires workup  Closed head injury, initial encounter: new and requires workup  Diagnosis management comments: CT head and max/face with no acute findings. XR bilateral hands negative for fracture or FB. Pt wounds cleaned. None needed suture repair as they were all superficial abrasions. Tetanus updated. Pt well appearing. Neuro intact. Ambulatory without assistance. Discussed thoroughly with family return precautions. Instructed to follow-up with PCP on tomorrow. Amount and/or Complexity of Data Reviewed  Tests in the radiology section of CPT®: ordered and reviewed  Tests in the medicine section of CPT®: ordered and reviewed  Review and summarize past medical records: yes  Independent visualization of images, tracings, or specimens: yes    Risk of Complications, Morbidity, and/or Mortality  Presenting problems: moderate  Diagnostic procedures: low  Management options: low    Patient Progress  Patient progress: stable        ED Course   Comment By Time   CT head IMPRESSION:   1. No acute intracranial abnormality. 2. Mild chronic white matter microangiopathic changes. Wilfrid Sy MD 02/18 2140   CT max/face IMPRESSION: Unremarkable CT of the face. No acute pathology appreciated. There  is slight deviation of the bony nasal septum to the left. Wilfrid Sy MD 02/18 2140   XR R Hand IMPRESSION: Mild OA changes at the first MCP joint and DRUJ. No acute osseous abnormality evident. Elbert Bone MD 02/18 2248   XR LT Hand IMPRESSION: Mild OA at the DRUJ. Otherwise, no acute osseous abnormality.  Elbert Bone MD 02/18 2248       Procedures

## 2018-06-04 ENCOUNTER — HOSPITAL ENCOUNTER (OUTPATIENT)
Dept: PHYSICAL THERAPY | Age: 83
Discharge: HOME | End: 2018-06-04
Payer: MEDICARE

## 2018-06-04 PROCEDURE — G8979 MOBILITY GOAL STATUS: HCPCS

## 2018-06-04 PROCEDURE — 97162 PT EVAL MOD COMPLEX 30 MIN: CPT

## 2018-06-04 PROCEDURE — 97110 THERAPEUTIC EXERCISES: CPT

## 2018-06-04 PROCEDURE — G8978 MOBILITY CURRENT STATUS: HCPCS

## 2018-06-04 NOTE — PROGRESS NOTES
Ambulatory/Rehab Services H2 Model Falls Risk Assessment    Risk Factor Pts. ·   Confusion/Disorientation/Impulsivity  []    4 ·   Symptomatic Depression  []   2 ·   Altered Elimination  []   1 ·   Dizziness/Vertigo  []   1 ·   Gender (Male)  [x]   1 ·   Any administered antiepileptics (anticonvulsants):  []   2 ·   Any administered benzodiazepines:  []   1 ·   Visual Impairment (specify):  []   1 ·   Portable Oxygen Use  []   1 ·   Orthostatic ? BP  []   1 ·   History of Recent Falls (within 3 mos.)  [x]   5     Ability to Rise from Chair (choose one) Pts. ·   Ability to rise in a single movement  []   0 ·   Pushes up, successful in one attempt  [x]   1 ·   Multiple attempts, but successful  []   3 ·   Unable to rise without assistance  []   4   Total: (5 or greater = High Risk) 7     Falls Prevention Plan:   []                Physical Limitations to Exercise (specify):   []                Mobility Assistance Device (type):   [x]                Exercise/Equipment Adaptation (specify):    ©2010 [facility] [address] [place] [address] Patent #[id number].  Federal Law prohibits the replication, distribution or use without written permission from [facility] [employer]

## 2018-06-04 NOTE — THERAPY EVALUATION
Nakul Philip  : 1935  [place]  Secondary: Artur Ward [address] at [address]  [phone]   [phone]          OUTPATIENT PHYSICAL THERAPY:Initial Assessment and Daily Note 2018 1   ICD-10: Treatment Diagnosis: Low back pain (M54.5), Pain in L hip (M25.552), difficulty walking, not elsewhere classified (R26.2)  Precautions/Allergies:   Review of patient's allergies indicates no known allergies. Fall Risk Score: 7 (? 5 = High Risk)  MD Orders: Eval and treat MEDICAL/REFERRING DIAGNOSIS:  Low back pain [M54.5]   DATE OF ONSET: over a year ago  REFERRING PHYSICIAN: Linette Chen MD  RETURN PHYSICIAN APPOINTMENT: End      INITIAL ASSESSMENT:  Mr. Ryan Corral presents with long history of LBP that has increased over the past year. Pt did have PT about 1 year ago, but doesn't remember much due to dementia symptoms. Pt has fallen two times in the past year per daughter, Rachael Thornton, who was present during evaluation. Pt with point specific pain over L SI joint area. Pt also a long time golfer, and reports his x-ray of back \"looked really bad. \" Pt presents with increased pain, decreased strength, ROM, and gait. Pt will benefit from skilled PT to address these deficits. PROBLEM LIST (Impacting functional limitations):  1. Decreased Strength  2. Decreased Balance  3. Increased Pain  4. Decreased Activity Tolerance  5. Decreased Flexibility/Joint Mobility  6. Decreased Knowledge of Precautions  7. Decreased [place] with Home Exercise Program  8. Decreased Cognition INTERVENTIONS PLANNED:  1. Home Exercise Program (HEP)  2. Manual Therapy  3. Neuromuscular Re-education/Strengthening  4. Range of Motion (ROM)  5. Therapeutic Activites  6. Therapeutic Exercise/Strengthening  7. Transcutaneous Electrical Nerve Stimulation (TENS)  8.  Ultrasound (US)   TREATMENT PLAN:  Effective Dates: 6/4/2018 TO 8/3/2018 (60 days). Frequency/Duration: 2 times a week for 60 Days  GOALS: (Goals have been discussed and agreed upon with patient.)  Discharge Goals: Time Frame: 8 weeks  1. Pt independent with final HEP, and he is able to perform without pain or difficulty. 2. Pt will improve trunk extension and L sidebend AROM by 15% to allow for him to bend safely without falling. 3. Pt will improve gross L LE muscle strength by 1/2 grade to allow for improved balance and safety. 4. Pt will decrease pain at worse to 4/10 to allow for improved quality of living. 5. Pt will improve MORALES by 10 points to allow for improved ADLs. Rehabilitation Potential For Stated Goals: Fair              The information in this section was collected on 6/4/18 (except where otherwise noted). HISTORY:   History of Present Injury/Illness (Reason for Referral):  Pt with long history of LBP that has worsened over the past year. Past Medical History/Comorbidities:   Mr. Radha Story  has a past medical history of Abnormal cardiovascular function study (3/18/2016); Arthritis; Coronary atherosclerosis of native coronary vessel (3/18/2016); Coronary atherosclerosis of native coronary vessel (3/18/2016); Dyspnea on exertion; HLD (hyperlipidemia) (3/18/2016); Hypertension; Seizures ([facility] [address] and Upper respiratory infection. Mr. Radha Story  has a past surgical history that includes hx orthopaedic and hx other surgical.  Social History/Living Environment:     Pt lives at the [place] in an apartment with wife. Prior Level of Function/Work/Activity:  Independent with all ADLs and gait. Dominant Side:         LEFT  Current Medications:       Current Outpatient Prescriptions:     celecoxib (CELEBREX) 200 mg capsule, Take  by mouth two (2) times a day., Disp: , Rfl:     tamsulosin (FLOMAX) 0.4 mg capsule, Take 0.4 mg by mouth daily. , Disp: , Rfl:     metoprolol succinate (TOPROL-XL) 25 mg XL tablet, Take 1 Tab by mouth daily. , Disp: 90 Tab, Rfl: 3    atorvastatin (LIPITOR) 40 mg tablet, Take 1 Tab by mouth daily. , Disp: 30 Tab, Rfl: 5    donepezil (ARICEPT) 5 mg tablet, 10 mg., Disp: , Rfl:     lisinopril-hydrochlorothiazide (PRINZIDE, ZESTORETIC) 20-12.5 mg per tablet, , Disp: , Rfl:     traZODone (DESYREL) 100 mg tablet, , Disp: , Rfl:    Date Last Reviewed:  6/4/2018     Number of Personal Factors/Comorbidities that affect the Plan of Care: 1-2: MODERATE COMPLEXITY   EXAMINATION:   Observation/Orthostatic Postural Assessment:          Pt with L posterior pelvic rotation and slight curve in L-spine  Palpation:          Tenderness at L SI joint  ROM:          Trunk extension decreased 75%, Trunk L sidebend decreased 15%, all others WFL. Strength:          L LE grossly 4+/5   Body Structures Involved:  1. Bones  2. Joints  3. Muscles  4. Ligaments Body Functions Affected:  1. Neuromusculoskeletal Activities and Participation Affected:  1. Mobility  2. Self Care  3. Community, Social and [place]   Number of elements (examined above) that affect the Plan of Care: 3: MODERATE COMPLEXITY   CLINICAL PRESENTATION:   Presentation: Evolving clinical presentation with changing clinical characteristics: MODERATE COMPLEXITY   CLINICAL DECISION MAKING:   Outcome Measure: Tool Used: Modified Oswestry Low Back Pain Questionnaire  Score:  Initial: 24/50  Most Recent: X/50 (Date: -- )   Interpretation of Score: Each section is scored on a 0-5 scale, 5 representing the greatest disability. The scores of each section are added together for a total score of 50. Score 0 1-10 11-20 21-30 31-40 41-49 50   Modifier CH CI CJ CK CL CM CN     ?  Mobility - Walking and Moving Around:     - CURRENT STATUS: CK - 40%-59% impaired, limited or restricted    - GOAL STATUS: CJ - 20%-39% impaired, limited or restricted    - D/C STATUS:  ---------------To be determined---------------    Medical Necessity:   · Patient is expected to demonstrate progress in strength, range of motion, balance, coordination and functional technique to improve safety during gait and ADLs. Reason for Services/Other Comments:  · Patient continues to require modification of therapeutic interventions to increase complexity of exercises. Use of outcome tool(s) and clinical judgement create a POC that gives a: Questionable prediction of patient's progress: MODERATE COMPLEXITY            TREATMENT:   (In addition to Assessment/Re-Assessment sessions the following treatments were rendered)  Pre-treatment Symptoms/Complaints:  Pt with decreased pain at end of session  Pain: Initial:   Pain Intensity 1: 8  Pain Location 1: Back  Pain Orientation 1: Left  Pain Intervention(s) 1: Exercise  Post Session:  Pain decreased to 6/10     THERAPEUTIC EXERCISE: (20 minutes):  Exercises per grid below to improve mobility, strength, balance and coordination. Required maximal visual, verbal and manual cues to promote proper body alignment, promote proper body posture and promote proper body mechanics. Progressed complexity of movement as indicated. Date:  6/4/18 (1) Date:   Date:     Activity/Exercise Parameters Parameters Parameters   bridging 2 x 10 reps for improved strength and decreased pain     MET to correct L posterior pelvic rotation 5 x 5 second holds to correct pelvic alignment     Shotgun technique to correct pelvic rotation 5 x 5 second holds to correct pelvic alignment     Supine trunk rotation 5 x 10 second holds for improved flexibility                           [facility] Portal  Treatment/Session Assessment:    · Response to Treatment:  Pt with decreased pain at end of session. · Compliance with Program/Exercises: Will assess as treatment progresses. · Recommendations/Intent for next treatment session: \"Next visit will focus on advancements to more challenging activities\".   Total Treatment Duration: Evaluation: 35 minutes, therex: 20 minutes  PT Patient Time In/Time Out  Time In: 1330  Time Out: [address] [facility],

## 2018-06-20 ENCOUNTER — HOSPITAL ENCOUNTER (OUTPATIENT)
Dept: PHYSICAL THERAPY | Age: 83
Discharge: HOME | End: 2018-06-20
Payer: MEDICARE

## 2018-06-20 NOTE — PROGRESS NOTES
Nathaly Souza  : 1935  [place]  Secondary: Artur Ward [address] at [facility] [address]  [phone]   [phone]        OUTPATIENT DAILY NOTE    NAME/AGE/GENDER: Nathaly Souza is a 81 y.o. male. DATE: 2018    Patient canceled for appointment today due to being sick. Will plan to follow up on next scheduled visit.     Sean Cordero, PT

## 2018-06-22 ENCOUNTER — HOSPITAL ENCOUNTER (OUTPATIENT)
Dept: PHYSICAL THERAPY | Age: 83
Discharge: HOME | End: 2018-06-22
Payer: MEDICARE

## 2018-06-22 PROCEDURE — 97110 THERAPEUTIC EXERCISES: CPT

## 2018-06-22 NOTE — PROGRESS NOTES
Nakul Philip  : 1935  [place]  Secondary: Artur Lewis at [address] [place] [address]  [phone]   [phone]          OUTPATIENT PHYSICAL THERAPY:Daily Note 2018 2   ICD-10: Treatment Diagnosis: Low back pain (M54.5), Pain in L hip (M25.552), difficulty walking, not elsewhere classified (R26.2)  Precautions/Allergies:   Review of patient's allergies indicates no known allergies. Fall Risk Score: 7 (? 5 = High Risk)  MD Orders: Eval and treat MEDICAL/REFERRING DIAGNOSIS:  Low back pain [M54.5]   DATE OF ONSET: over a year ago  REFERRING PHYSICIAN: Linette Chen MD  RETURN PHYSICIAN APPOINTMENT: End      INITIAL ASSESSMENT:  Mr. Ryan Corral presents with long history of LBP that has increased over the past year. Pt did have PT about 1 year ago, but doesn't remember much due to dementia symptoms. Pt has fallen two times in the past year per daughter, Rachael Thornton, who was present during evaluation. Pt with point specific pain over L SI joint area. Pt also a long time golfer, and reports his x-ray of back \"looked really bad. \" Pt presents with increased pain, decreased strength, ROM, and gait. Pt will benefit from skilled PT to address these deficits. PROBLEM LIST (Impacting functional limitations):  1. Decreased Strength  2. Decreased Balance  3. Increased Pain  4. Decreased Activity Tolerance  5. Decreased Flexibility/Joint Mobility  6. Decreased Knowledge of Precautions  7. Decreased [place] with Home Exercise Program  8. Decreased Cognition INTERVENTIONS PLANNED:  1. Home Exercise Program (HEP)  2. Manual Therapy  3. Neuromuscular Re-education/Strengthening  4. Range of Motion (ROM)  5. Therapeutic Activites  6. Therapeutic Exercise/Strengthening  7. Transcutaneous Electrical Nerve Stimulation (TENS)  8.  Ultrasound (US)   TREATMENT PLAN:  Effective Dates: 2018 TO 8/3/2018 (60 days). Frequency/Duration: 2 times a week for 60 Days  GOALS: (Goals have been discussed and agreed upon with patient.)  Discharge Goals: Time Frame: 8 weeks  1. Pt independent with final HEP, and he is able to perform without pain or difficulty. 2. Pt will improve trunk extension and L sidebend AROM by 15% to allow for him to bend safely without falling. 3. Pt will improve gross L LE muscle strength by 1/2 grade to allow for improved balance and safety. 4. Pt will decrease pain at worse to 4/10 to allow for improved quality of living. 5. Pt will improve MORALES by 10 points to allow for improved ADLs. Rehabilitation Potential For Stated Goals: Fair              The information in this section was collected on 6/4/18 (except where otherwise noted). HISTORY:   History of Present Injury/Illness (Reason for Referral):  Pt with long history of LBP that has worsened over the past year. Past Medical History/Comorbidities:   Mr. Jeffry Head  has a past medical history of Abnormal cardiovascular function study (3/18/2016); Arthritis; Coronary atherosclerosis of native coronary vessel (3/18/2016); Coronary atherosclerosis of native coronary vessel (3/18/2016); Dyspnea on exertion; HLD (hyperlipidemia) (3/18/2016); Hypertension; Seizures ([address] and Upper respiratory infection. Mr. Jeffry Head  has a past surgical history that includes hx orthopaedic and hx other surgical.  Social History/Living Environment:     Pt lives at the [place] in an apartment with wife. Prior Level of Function/Work/Activity:  Independent with all ADLs and gait. Dominant Side:         LEFT  Current Medications:       Current Outpatient Prescriptions:     celecoxib (CELEBREX) 200 mg capsule, Take  by mouth two (2) times a day., Disp: , Rfl:     tamsulosin (FLOMAX) 0.4 mg capsule, Take 0.4 mg by mouth daily. , Disp: , Rfl:     metoprolol succinate (TOPROL-XL) 25 mg XL tablet, Take 1 Tab by mouth daily. , Disp: 90 Tab, Rfl: 3   atorvastatin (LIPITOR) 40 mg tablet, Take 1 Tab by mouth daily. , Disp: 30 Tab, Rfl: 5    donepezil (ARICEPT) 5 mg tablet, 10 mg., Disp: , Rfl:     lisinopril-hydrochlorothiazide (PRINZIDE, ZESTORETIC) 20-12.5 mg per tablet, , Disp: , Rfl:     traZODone (DESYREL) 100 mg tablet, , Disp: , Rfl:    Date Last Reviewed:  6/22/2018     Number of Personal Factors/Comorbidities that affect the Plan of Care: 1-2: MODERATE COMPLEXITY   EXAMINATION:   Observation/Orthostatic Postural Assessment:          Pt with L posterior pelvic rotation and slight curve in L-spine  Palpation:          Tenderness at L SI joint  ROM:          Trunk extension decreased 75%, Trunk L sidebend decreased 15%, all others WFL. Strength:          L LE grossly 4+/5   Body Structures Involved:  1. Bones  2. Joints  3. Muscles  4. Ligaments Body Functions Affected:  1. Neuromusculoskeletal Activities and Participation Affected:  1. Mobility  2. Self Care  3. Community, Social and [place]   Number of elements (examined above) that affect the Plan of Care: 3: MODERATE COMPLEXITY   CLINICAL PRESENTATION:   Presentation: Evolving clinical presentation with changing clinical characteristics: MODERATE COMPLEXITY   CLINICAL DECISION MAKING:   Outcome Measure: Tool Used: Modified Oswestry Low Back Pain Questionnaire  Score:  Initial: 24/50  Most Recent: X/50 (Date: -- )   Interpretation of Score: Each section is scored on a 0-5 scale, 5 representing the greatest disability. The scores of each section are added together for a total score of 50. Score 0 1-10 11-20 21-30 31-40 41-49 50   Modifier CH CI CJ CK CL CM CN     ?  Mobility - Walking and Moving Around:     - CURRENT STATUS: CK - 40%-59% impaired, limited or restricted    - GOAL STATUS: CJ - 20%-39% impaired, limited or restricted    - D/C STATUS:  ---------------To be determined---------------    Medical Necessity:   · Patient is expected to demonstrate progress in strength, range of motion, balance, coordination and functional technique to improve safety during gait and ADLs. Reason for Services/Other Comments:  · Patient continues to require modification of therapeutic interventions to increase complexity of exercises. Use of outcome tool(s) and clinical judgement create a POC that gives a: Questionable prediction of patient's progress: MODERATE COMPLEXITY            TREATMENT:   (In addition to Assessment/Re-Assessment sessions the following treatments were rendered)  Pre-treatment Symptoms/Complaints:  Pt reports he is feeling better since doing HEP. His daughter and granddaughter have been making sure he has been doing it. Pain: Initial:   Pain Intensity 1: 4  Pain Location 1: Back  Pain Orientation 1: Left  Pain Intervention(s) 1: Exercise  Post Session:  Pain decreased to 2/10     THERAPEUTIC EXERCISE: (55 minutes):  Exercises per grid below to improve mobility, strength, balance and coordination. Required maximal visual, verbal and manual cues to promote proper body alignment, promote proper body posture and promote proper body mechanics. Progressed complexity of movement as indicated.      Date:  6/4/18 (1) Date:  6/22/18 (2) Date:     Activity/Exercise Parameters Parameters Parameters   bridging 2 x 10 reps for improved strength and decreased pain 2 x 10 reps for improved strength and decreased pain    MET to correct L posterior pelvic rotation 5 x 5 second holds to correct pelvic alignment     Shotgun technique to correct pelvic rotation 5 x 5 second holds to correct pelvic alignment     Supine trunk rotation 5 x 10 second holds for improved flexibility B 5 x 15 second holds for improved flexibility    Knee to chest stretch  B 5 x 15 second holds for improved flexibility    Supine Hamstring stretch  B 5 x 15 second holds for improved flexibility    Butterfly stretch supine  5 x 15 second holds for improved flexibility    Supine SLR  2 x 10 reps for improved strength and decreased pain    Standing Romberg stance  3 x 15 seconds for improved balance    Modified tandem stance  3 x 15 seconds for improved balance    Heel raises  2 x 10 reps for improved strength    Toes raises  2 x 10 reps for improved strength        [facility] Portal  Treatment/Session Assessment:    · Response to Treatment:  Pt with decreased pain at end of session and improved flexibility. Pt did have some difficulty with balance exercises. · Compliance with Program/Exercises: Will assess as treatment progresses. · Recommendations/Intent for next treatment session: \"Next visit will focus on advancements to more challenging activities\". Continue to progress with core strength and balance as tolerated.   Total Treatment Duration:  55 minutes  PT Patient Time In/Time Out  Time In: 1320  Time Out: [address], PT

## 2018-06-25 ENCOUNTER — HOSPITAL ENCOUNTER (OUTPATIENT)
Dept: PHYSICAL THERAPY | Age: 83
Discharge: HOME | End: 2018-06-25
Payer: MEDICARE

## 2018-06-25 PROCEDURE — 97110 THERAPEUTIC EXERCISES: CPT

## 2018-06-25 NOTE — PROGRESS NOTES
Kory Jackson  : 1935  [place]  Secondary: Artur Lewis at [address] [place] [address]  [phone]   [phone]          OUTPATIENT PHYSICAL THERAPY:Daily Note 2018 3   ICD-10: Treatment Diagnosis: Low back pain (M54.5), Pain in L hip (M25.552), difficulty walking, not elsewhere classified (R26.2)  Precautions/Allergies:   Review of patient's allergies indicates no known allergies. Fall Risk Score: 7 (? 5 = High Risk)  MD Orders: Eval and treat MEDICAL/REFERRING DIAGNOSIS:  Low back pain [M54.5]   DATE OF ONSET: over a year ago  REFERRING PHYSICIAN: Karla Deras MD  RETURN PHYSICIAN APPOINTMENT: End      INITIAL ASSESSMENT:  Mr. Bard Martinez presents with long history of LBP that has increased over the past year. Pt did have PT about 1 year ago, but doesn't remember much due to dementia symptoms. Pt has fallen two times in the past year per daughter, Herminia Bryan, who was present during evaluation. Pt with point specific pain over L SI joint area. Pt also a long time golfer, and reports his x-ray of back \"looked really bad. \" Pt presents with increased pain, decreased strength, ROM, and gait. Pt will benefit from skilled PT to address these deficits. PROBLEM LIST (Impacting functional limitations):  1. Decreased Strength  2. Decreased Balance  3. Increased Pain  4. Decreased Activity Tolerance  5. Decreased Flexibility/Joint Mobility  6. Decreased Knowledge of Precautions  7. Decreased [place] with Home Exercise Program  8. Decreased Cognition INTERVENTIONS PLANNED:  1. Home Exercise Program (HEP)  2. Manual Therapy  3. Neuromuscular Re-education/Strengthening  4. Range of Motion (ROM)  5. Therapeutic Activites  6. Therapeutic Exercise/Strengthening  7. Transcutaneous Electrical Nerve Stimulation (TENS)  8.  Ultrasound (US)   TREATMENT PLAN:  Effective Dates: 2018 TO 8/3/2018 (60 days). Frequency/Duration: 2 times a week for 60 Days  GOALS: (Goals have been discussed and agreed upon with patient.)  Discharge Goals: Time Frame: 8 weeks  1. Pt independent with final HEP, and he is able to perform without pain or difficulty. 2. Pt will improve trunk extension and L sidebend AROM by 15% to allow for him to bend safely without falling. 3. Pt will improve gross L LE muscle strength by 1/2 grade to allow for improved balance and safety. 4. Pt will decrease pain at worse to 4/10 to allow for improved quality of living. 5. Pt will improve MORALES by 10 points to allow for improved ADLs. Rehabilitation Potential For Stated Goals: Fair              The information in this section was collected on 6/4/18 (except where otherwise noted). HISTORY:   History of Present Injury/Illness (Reason for Referral):  Pt with long history of LBP that has worsened over the past year. Past Medical History/Comorbidities:   Mr. Ryan Corral  has a past medical history of Abnormal cardiovascular function study (3/18/2016); Arthritis; Coronary atherosclerosis of native coronary vessel (3/18/2016); Coronary atherosclerosis of native coronary vessel (3/18/2016); Dyspnea on exertion; HLD (hyperlipidemia) (3/18/2016); Hypertension; Seizures ([facility] [address] and Upper respiratory infection. Mr. Ryan Corral  has a past surgical history that includes hx orthopaedic and hx other surgical.  Social History/Living Environment:     Pt lives at the [facility] in an apartment with wife. Prior Level of Function/Work/Activity:  Independent with all ADLs and gait. Dominant Side:         LEFT  Current Medications:       Current Outpatient Prescriptions:     celecoxib (CELEBREX) 200 mg capsule, Take  by mouth two (2) times a day., Disp: , Rfl:     tamsulosin (FLOMAX) 0.4 mg capsule, Take 0.4 mg by mouth daily. , Disp: , Rfl:     metoprolol succinate (TOPROL-XL) 25 mg XL tablet, Take 1 Tab by mouth daily. , Disp: 90 Tab, Rfl: 3   atorvastatin (LIPITOR) 40 mg tablet, Take 1 Tab by mouth daily. , Disp: 30 Tab, Rfl: 5    donepezil (ARICEPT) 5 mg tablet, 10 mg., Disp: , Rfl:     lisinopril-hydrochlorothiazide (PRINZIDE, ZESTORETIC) 20-12.5 mg per tablet, , Disp: , Rfl:     traZODone (DESYREL) 100 mg tablet, , Disp: , Rfl:    Date Last Reviewed:  6/25/2018     Number of Personal Factors/Comorbidities that affect the Plan of Care: 1-2: MODERATE COMPLEXITY   EXAMINATION:   Observation/Orthostatic Postural Assessment:          Pt with L posterior pelvic rotation and slight curve in L-spine  Palpation:          Tenderness at L SI joint  ROM:          Trunk extension decreased 75%, Trunk L sidebend decreased 15%, all others WFL. Strength:          L LE grossly 4+/5   Body Structures Involved:  1. Bones  2. Joints  3. Muscles  4. Ligaments Body Functions Affected:  1. Neuromusculoskeletal Activities and Participation Affected:  1. Mobility  2. Self Care  3. Community, Social and [place]   Number of elements (examined above) that affect the Plan of Care: 3: MODERATE COMPLEXITY   CLINICAL PRESENTATION:   Presentation: Evolving clinical presentation with changing clinical characteristics: MODERATE COMPLEXITY   CLINICAL DECISION MAKING:   Outcome Measure: Tool Used: Modified Oswestry Low Back Pain Questionnaire  Score:  Initial: 24/50  Most Recent: X/50 (Date: -- )   Interpretation of Score: Each section is scored on a 0-5 scale, 5 representing the greatest disability. The scores of each section are added together for a total score of 50. Score 0 1-10 11-20 21-30 31-40 41-49 50   Modifier CH CI CJ CK CL CM CN     ?  Mobility - Walking and Moving Around:     - CURRENT STATUS: CK - 40%-59% impaired, limited or restricted    - GOAL STATUS: CJ - 20%-39% impaired, limited or restricted    - D/C STATUS:  ---------------To be determined---------------    Medical Necessity:   · Patient is expected to demonstrate progress in strength, range of motion, balance, coordination and functional technique to improve safety during gait and ADLs. Reason for Services/Other Comments:  · Patient continues to require modification of therapeutic interventions to increase complexity of exercises. Use of outcome tool(s) and clinical judgement create a POC that gives a: Questionable prediction of patient's progress: MODERATE COMPLEXITY            TREATMENT:   (In addition to Assessment/Re-Assessment sessions the following treatments were rendered)  Pre-treatment Symptoms/Complaints:  Pt reports he continues to feel a little better every time he does his exercises. Pain: Initial:   Pain Intensity 1: 3  Pain Location 1: Back  Pain Orientation 1: Left  Pain Intervention(s) 1: Exercise  Post Session:  Pain decreased to 2/10     THERAPEUTIC EXERCISE: (55 minutes):  Exercises per grid below to improve mobility, strength, balance and coordination. Required maximal visual, verbal and manual cues to promote proper body alignment, promote proper body posture and promote proper body mechanics. Progressed complexity of movement as indicated.      Date:  6/4/18 (1) Date:  6/22/18 (2) Date:  6/25/18 (3)   Activity/Exercise Parameters Parameters Parameters   bridging 2 x 10 reps for improved strength and decreased pain 2 x 10 reps for improved strength and decreased pain 2 x 10 reps for improved strength and decreased pain   MET to correct L posterior pelvic rotation 5 x 5 second holds to correct pelvic alignment     Shotgun technique to correct pelvic rotation 5 x 5 second holds to correct pelvic alignment     Supine trunk rotation 5 x 10 second holds for improved flexibility B 5 x 15 second holds for improved flexibility B 5 x 15 second holds for improved flexibility   Knee to chest stretch  B 5 x 15 second holds for improved flexibility B 5 x 15 second holds for improved flexibility   Supine Hamstring stretch  B 5 x 15 second holds for improved flexibility B 5 x 15 second holds for improved flexibility   Butterfly stretch supine  5 x 15 second holds for improved flexibility B 5 x 15 second holds for improved flexibility   Supine SLR  2 x 10 reps for improved strength and decreased pain B 5 x 15 second holds for improved flexibility   Standing Romberg stance  3 x 15 seconds for improved balance    Modified tandem stance  3 x 15 seconds for improved balance    Heel raises  2 x 10 reps for improved strength    Toes raises  2 x 10 reps for improved strength    Seated hip flexion with band   B 2 x 5 reps with red band for improved strength   Seated hip step outs with band   B 2 x 5 reps with red band for improved strength   hooklying hip abduction with band   B 2 x 5 reps with red band for improved strength       [facility] Portal  Treatment/Session Assessment:    · Response to Treatment:  Pt with decreased pain at end of session and improved flexibility. Did have some difficulty with eccentric movement in seated position with LEs. · Compliance with Program/Exercises: Will assess as treatment progresses. · Recommendations/Intent for next treatment session: \"Next visit will focus on advancements to more challenging activities\". Continue to progress with core strength and balance as tolerated.   Total Treatment Duration:  55 minutes  PT Patient Time In/Time Out  Time In: 1325  Time Out: Dutch 258, PT

## 2018-06-29 ENCOUNTER — HOSPITAL ENCOUNTER (OUTPATIENT)
Dept: PHYSICAL THERAPY | Age: 83
Discharge: HOME | End: 2018-06-29
Payer: MEDICARE

## 2018-06-29 PROCEDURE — 97110 THERAPEUTIC EXERCISES: CPT

## 2018-06-29 NOTE — PROGRESS NOTES
Fede Marilee  : 1935  [place]  Secondary: Artur Lewis at [address]  [phone]   [phone]          OUTPATIENT PHYSICAL THERAPY:Daily Note 2018 4   ICD-10: Treatment Diagnosis: Low back pain (M54.5), Pain in L hip (M25.552), difficulty walking, not elsewhere classified (R26.2)  Precautions/Allergies:   Review of patient's allergies indicates no known allergies. Fall Risk Score: 7 (? 5 = High Risk)  MD Orders: Eval and treat MEDICAL/REFERRING DIAGNOSIS:  Low back pain [M54.5]   DATE OF ONSET: over a year ago  REFERRING PHYSICIAN: nAg Lu MD  RETURN PHYSICIAN APPOINTMENT: End      INITIAL ASSESSMENT:  Mr. Michael Pizarro presents with long history of LBP that has increased over the past year. Pt did have PT about 1 year ago, but doesn't remember much due to dementia symptoms. Pt has fallen two times in the past year per daughter, Kristina Galeano, who was present during evaluation. Pt with point specific pain over L SI joint area. Pt also a long time golfer, and reports his x-ray of back \"looked really bad. \" Pt presents with increased pain, decreased strength, ROM, and gait. Pt will benefit from skilled PT to address these deficits. PROBLEM LIST (Impacting functional limitations):  1. Decreased Strength  2. Decreased Balance  3. Increased Pain  4. Decreased Activity Tolerance  5. Decreased Flexibility/Joint Mobility  6. Decreased Knowledge of Precautions  7. Decreased [place] with Home Exercise Program  8. Decreased Cognition INTERVENTIONS PLANNED:  1. Home Exercise Program (HEP)  2. Manual Therapy  3. Neuromuscular Re-education/Strengthening  4. Range of Motion (ROM)  5. Therapeutic Activites  6. Therapeutic Exercise/Strengthening  7. Transcutaneous Electrical Nerve Stimulation (TENS)  8.  Ultrasound (US)   TREATMENT PLAN:  Effective Dates: 2018 TO 8/3/2018 (60 days). Frequency/Duration: 2 times a week for 60 Days  GOALS: (Goals have been discussed and agreed upon with patient.)  Discharge Goals: Time Frame: 8 weeks  1. Pt independent with final HEP, and he is able to perform without pain or difficulty. 2. Pt will improve trunk extension and L sidebend AROM by 15% to allow for him to bend safely without falling. 3. Pt will improve gross L LE muscle strength by 1/2 grade to allow for improved balance and safety. 4. Pt will decrease pain at worse to 4/10 to allow for improved quality of living. 5. Pt will improve MORALES by 10 points to allow for improved ADLs. Rehabilitation Potential For Stated Goals: Fair              The information in this section was collected on 6/4/18 (except where otherwise noted). HISTORY:   History of Present Injury/Illness (Reason for Referral):  Pt with long history of LBP that has worsened over the past year. Past Medical History/Comorbidities:   Mr. Shaina Hanna  has a past medical history of Abnormal cardiovascular function study (3/18/2016); Arthritis; Coronary atherosclerosis of native coronary vessel (3/18/2016); Coronary atherosclerosis of native coronary vessel (3/18/2016); Dyspnea on exertion; HLD (hyperlipidemia) (3/18/2016); Hypertension; Seizures ([address] and Upper respiratory infection. Mr. Shaina Hanna  has a past surgical history that includes hx orthopaedic and hx other surgical.  Social History/Living Environment:     Pt lives at the [facility] in an apartment with wife. Prior Level of Function/Work/Activity:  Independent with all ADLs and gait. Dominant Side:         LEFT  Current Medications:       Current Outpatient Prescriptions:     celecoxib (CELEBREX) 200 mg capsule, Take  by mouth two (2) times a day., Disp: , Rfl:     tamsulosin (FLOMAX) 0.4 mg capsule, Take 0.4 mg by mouth daily. , Disp: , Rfl:     metoprolol succinate (TOPROL-XL) 25 mg XL tablet, Take 1 Tab by mouth daily. , Disp: 90 Tab, Rfl: 3   atorvastatin (LIPITOR) 40 mg tablet, Take 1 Tab by mouth daily. , Disp: 30 Tab, Rfl: 5    donepezil (ARICEPT) 5 mg tablet, 10 mg., Disp: , Rfl:     lisinopril-hydrochlorothiazide (PRINZIDE, ZESTORETIC) 20-12.5 mg per tablet, , Disp: , Rfl:     traZODone (DESYREL) 100 mg tablet, , Disp: , Rfl:    Date Last Reviewed:  6/29/2018     Number of Personal Factors/Comorbidities that affect the Plan of Care: 1-2: MODERATE COMPLEXITY   EXAMINATION:   Observation/Orthostatic Postural Assessment:          Pt with L posterior pelvic rotation and slight curve in L-spine  Palpation:          Tenderness at L SI joint  ROM:          Trunk extension decreased 75%, Trunk L sidebend decreased 15%, all others WFL. Strength:          L LE grossly 4+/5   Body Structures Involved:  1. Bones  2. Joints  3. Muscles  4. Ligaments Body Functions Affected:  1. Neuromusculoskeletal Activities and Participation Affected:  1. Mobility  2. Self Care  3. Community, Social and [place]   Number of elements (examined above) that affect the Plan of Care: 3: MODERATE COMPLEXITY   CLINICAL PRESENTATION:   Presentation: Evolving clinical presentation with changing clinical characteristics: MODERATE COMPLEXITY   CLINICAL DECISION MAKING:   Outcome Measure: Tool Used: Modified Oswestry Low Back Pain Questionnaire  Score:  Initial: 24/50  Most Recent: X/50 (Date: -- )   Interpretation of Score: Each section is scored on a 0-5 scale, 5 representing the greatest disability. The scores of each section are added together for a total score of 50. Score 0 1-10 11-20 21-30 31-40 41-49 50   Modifier CH CI CJ CK CL CM CN     ?  Mobility - Walking and Moving Around:     - CURRENT STATUS: CK - 40%-59% impaired, limited or restricted    - GOAL STATUS: CJ - 20%-39% impaired, limited or restricted    - D/C STATUS:  ---------------To be determined---------------    Medical Necessity:   · Patient is expected to demonstrate progress in strength, range of motion, balance, coordination and functional technique to improve safety during gait and ADLs. Reason for Services/Other Comments:  · Patient continues to require modification of therapeutic interventions to increase complexity of exercises. Use of outcome tool(s) and clinical judgement create a POC that gives a: Questionable prediction of patient's progress: MODERATE COMPLEXITY            TREATMENT:   (In addition to Assessment/Re-Assessment sessions the following treatments were rendered)  Pre-treatment Symptoms/Complaints:  Pt reports he still is feeling a little better, but L low back still point tender. Pain: Initial:   Pain Intensity 1: 3  Pain Location 1: Back  Pain Orientation 1: Left  Pain Intervention(s) 1: Exercise  Post Session:  Pain decreased to 2/10     THERAPEUTIC EXERCISE: (55 minutes):  Exercises per grid below to improve mobility, strength, balance and coordination. Required maximal visual, verbal and manual cues to promote proper body alignment, promote proper body posture and promote proper body mechanics. Progressed complexity of movement as indicated.      Date:  6/22/18 (2) Date:  6/25/18 (3) Date:  6/29/18 (4)   Activity/Exercise Parameters Parameters    bridging 2 x 10 reps for improved strength and decreased pain 2 x 10 reps for improved strength and decreased pain 2 x 10 reps for improved strength and decreased pain   MET to correct L posterior pelvic rotation      Shotgun technique to correct pelvic rotation      Supine trunk rotation B 5 x 15 second holds for improved flexibility B 5 x 15 second holds for improved flexibility B 5 x 15 second holds for improved flexibility   Knee to chest stretch B 5 x 15 second holds for improved flexibility B 5 x 15 second holds for improved flexibility B 5 x 15 second holds for improved flexibility   Supine Hamstring stretch B 5 x 15 second holds for improved flexibility B 5 x 15 second holds for improved flexibility B 5 x 15 second holds for improved flexibility   Butterfly stretch supine 5 x 15 second holds for improved flexibility B 5 x 15 second holds for improved flexibility B 5 x 15 second holds for improved flexibility   Supine SLR 2 x 10 reps for improved strength and decreased pain B 5 x 15 second holds for improved flexibility B 5 x 15 second holds for improved flexibility   Standing Romberg stance 3 x 15 seconds for improved balance     Modified tandem stance 3 x 15 seconds for improved balance     Heel raises 2 x 10 reps for improved strength     Toes raises 2 x 10 reps for improved strength     Seated hip flexion with band  B 2 x 5 reps with red band for improved strength B 2 x 5 reps with red band for improved strength   Seated hip step outs with band  B 2 x 5 reps with red band for improved strength B 2 x 5 reps with red band for improved strength   hooklying hip abduction with band  B 2 x 5 reps with red band for improved strength B 2 x 5 reps with red band for improved strength       [facility] Portal  Treatment/Session Assessment:    · Response to Treatment:  Pt with decreased pain at end of session and improved flexibility. Pt continues to have some difficulty with eccentric movement in seated position with LEs. · Compliance with Program/Exercises: Will assess as treatment progresses. · Recommendations/Intent for next treatment session: \"Next visit will focus on advancements to more challenging activities\". Continue to progress with core strength and balance as tolerated.   Total Treatment Duration:  55 minutes  PT Patient Time In/Time Out  Time In: 6967  Time Out: Alonso Hair PT

## 2018-07-02 ENCOUNTER — HOSPITAL ENCOUNTER (OUTPATIENT)
Dept: PHYSICAL THERAPY | Age: 83
Discharge: HOME | End: 2018-07-02
Payer: MEDICARE

## 2018-07-02 PROCEDURE — 97110 THERAPEUTIC EXERCISES: CPT

## 2018-07-02 NOTE — PROGRESS NOTES
Yara Lira  : 1935  [place]  Secondary: Artur Lewis at [address]  [phone]   [phone]          OUTPATIENT PHYSICAL THERAPY:Daily Note 2018 5   ICD-10: Treatment Diagnosis: Low back pain (M54.5), Pain in L hip (M25.552), difficulty walking, not elsewhere classified (R26.2)  Precautions/Allergies:   Review of patient's allergies indicates no known allergies. Fall Risk Score: 7 (? 5 = High Risk)  MD Orders: Eval and treat MEDICAL/REFERRING DIAGNOSIS:  Low back pain [M54.5]   DATE OF ONSET: over a year ago  REFERRING PHYSICIAN: Too Goss MD  RETURN PHYSICIAN APPOINTMENT: End      INITIAL ASSESSMENT:  Mr. Álvaro Kwong presents with long history of LBP that has increased over the past year. Pt did have PT about 1 year ago, but doesn't remember much due to dementia symptoms. Pt has fallen two times in the past year per daughter, Margarito Ozuna, who was present during evaluation. Pt with point specific pain over L SI joint area. Pt also a long time golfer, and reports his x-ray of back \"looked really bad. \" Pt presents with increased pain, decreased strength, ROM, and gait. Pt will benefit from skilled PT to address these deficits. PROBLEM LIST (Impacting functional limitations):  1. Decreased Strength  2. Decreased Balance  3. Increased Pain  4. Decreased Activity Tolerance  5. Decreased Flexibility/Joint Mobility  6. Decreased Knowledge of Precautions  7. Decreased [place] with Home Exercise Program  8. Decreased Cognition INTERVENTIONS PLANNED:  1. Home Exercise Program (HEP)  2. Manual Therapy  3. Neuromuscular Re-education/Strengthening  4. Range of Motion (ROM)  5. Therapeutic Activites  6. Therapeutic Exercise/Strengthening  7. Transcutaneous Electrical Nerve Stimulation (TENS)  8.  Ultrasound (US)   TREATMENT PLAN:  Effective Dates: 2018 TO 8/3/2018 (60 days). Frequency/Duration: 2 times a week for 60 Days  GOALS: (Goals have been discussed and agreed upon with patient.)  Discharge Goals: Time Frame: 8 weeks  1. Pt independent with final HEP, and he is able to perform without pain or difficulty. 2. Pt will improve trunk extension and L sidebend AROM by 15% to allow for him to bend safely without falling. 3. Pt will improve gross L LE muscle strength by 1/2 grade to allow for improved balance and safety. 4. Pt will decrease pain at worse to 4/10 to allow for improved quality of living. 5. Pt will improve MORALES by 10 points to allow for improved ADLs. Rehabilitation Potential For Stated Goals: Fair              The information in this section was collected on 6/4/18 (except where otherwise noted). HISTORY:   History of Present Injury/Illness (Reason for Referral):  Pt with long history of LBP that has worsened over the past year. Past Medical History/Comorbidities:   Mr. Shaina Hanna  has a past medical history of Abnormal cardiovascular function study (3/18/2016); Arthritis; Coronary atherosclerosis of native coronary vessel (3/18/2016); Coronary atherosclerosis of native coronary vessel (3/18/2016); Dyspnea on exertion; HLD (hyperlipidemia) (3/18/2016); Hypertension; Seizures ([address] and Upper respiratory infection. Mr. Shaina Hanna  has a past surgical history that includes hx orthopaedic and hx other surgical.  Social History/Living Environment:     Pt lives at the [facility] in an apartment with wife. Prior Level of Function/Work/Activity:  Independent with all ADLs and gait. Dominant Side:         LEFT  Current Medications:       Current Outpatient Prescriptions:     celecoxib (CELEBREX) 200 mg capsule, Take  by mouth two (2) times a day., Disp: , Rfl:     tamsulosin (FLOMAX) 0.4 mg capsule, Take 0.4 mg by mouth daily. , Disp: , Rfl:     metoprolol succinate (TOPROL-XL) 25 mg XL tablet, Take 1 Tab by mouth daily. , Disp: 90 Tab, Rfl: 3   atorvastatin (LIPITOR) 40 mg tablet, Take 1 Tab by mouth daily. , Disp: 30 Tab, Rfl: 5    donepezil (ARICEPT) 5 mg tablet, 10 mg., Disp: , Rfl:     lisinopril-hydrochlorothiazide (PRINZIDE, ZESTORETIC) 20-12.5 mg per tablet, , Disp: , Rfl:     traZODone (DESYREL) 100 mg tablet, , Disp: , Rfl:    Date Last Reviewed:  7/2/2018     Number of Personal Factors/Comorbidities that affect the Plan of Care: 1-2: MODERATE COMPLEXITY   EXAMINATION:   Observation/Orthostatic Postural Assessment:          Pt with L posterior pelvic rotation and slight curve in L-spine  Palpation:          Tenderness at L SI joint  ROM:          Trunk extension decreased 75%, Trunk L sidebend decreased 15%, all others WFL. Strength:          L LE grossly 4+/5   Body Structures Involved:  1. Bones  2. Joints  3. Muscles  4. Ligaments Body Functions Affected:  1. Neuromusculoskeletal Activities and Participation Affected:  1. Mobility  2. Self Care  3. Community, Social and [place]   Number of elements (examined above) that affect the Plan of Care: 3: MODERATE COMPLEXITY   CLINICAL PRESENTATION:   Presentation: Evolving clinical presentation with changing clinical characteristics: MODERATE COMPLEXITY   CLINICAL DECISION MAKING:   Outcome Measure: Tool Used: Modified Oswestry Low Back Pain Questionnaire  Score:  Initial: 24/50  Most Recent: X/50 (Date: -- )   Interpretation of Score: Each section is scored on a 0-5 scale, 5 representing the greatest disability. The scores of each section are added together for a total score of 50. Score 0 1-10 11-20 21-30 31-40 41-49 50   Modifier CH CI CJ CK CL CM CN     ?  Mobility - Walking and Moving Around:     - CURRENT STATUS: CK - 40%-59% impaired, limited or restricted    - GOAL STATUS: CJ - 20%-39% impaired, limited or restricted    - D/C STATUS:  ---------------To be determined---------------    Medical Necessity:   · Patient is expected to demonstrate progress in strength, range of motion, balance, coordination and functional technique to improve safety during gait and ADLs. Reason for Services/Other Comments:  · Patient continues to require modification of therapeutic interventions to increase complexity of exercises. Use of outcome tool(s) and clinical judgement create a POC that gives a: Questionable prediction of patient's progress: MODERATE COMPLEXITY            TREATMENT:   (In addition to Assessment/Re-Assessment sessions the following treatments were rendered)  Pre-treatment Symptoms/Complaints:  Pt reports he continues to feel better, and the Biofreeze really seems to help. Pain: Initial:   Pain Intensity 1: 2  Pain Location 1: Back  Pain Orientation 1: Left  Pain Intervention(s) 1: Exercise  Post Session:  Pain decreased to 1/10     THERAPEUTIC EXERCISE: (55 minutes):  Exercises per grid below to improve mobility, strength, balance and coordination. Required maximal visual, verbal and manual cues to promote proper body alignment, promote proper body posture and promote proper body mechanics. Progressed complexity of movement as indicated.      Date:  6/25/18 (3) Date:  6/29/18 (4) Date:  7/2/18 (5)   Activity/Exercise Parameters     bridging 2 x 10 reps for improved strength and decreased pain 2 x 10 reps for improved strength and decreased pain 2 x 10 reps for improved strength and decreased pain   Supine trunk rotation B 5 x 15 second holds for improved flexibility B 5 x 15 second holds for improved flexibility B 5 x 15 second holds for improved flexibility   Knee to chest stretch B 5 x 15 second holds for improved flexibility B 5 x 15 second holds for improved flexibility B 5 x 15 second holds for improved flexibility   Supine Hamstring stretch B 5 x 15 second holds for improved flexibility B 5 x 15 second holds for improved flexibility B 4 x 20 second holds for improved flexibility   Butterfly stretch supine B 5 x 15 second holds for improved flexibility B 5 x 15 second holds for improved flexibility B 4 x 20 second holds for improved flexibility   Supine SLR B 5 x 15 second holds for improved flexibility B 5 x 15 second holds for improved flexibility B 2 x 5 reps for improved strength   Seated hip flexion with band B 2 x 5 reps with red band for improved strength B 2 x 5 reps with red band for improved strength B 2 x 5 reps with red band for improved strength   Seated hip step outs with band B 2 x 5 reps with red band for improved strength B 2 x 5 reps with red band for improved strength B 2 x 5 reps with red band for improved strength   hooklying hip abduction with band B 2 x 5 reps with red band for improved strength B 2 x 5 reps with red band for improved strength B 2 x 5 reps with red band for improved strength   Supine knee to chest exercise   B 2 x 5 reps  for improved strength       MedBridge Portal  Treatment/Session Assessment:    · Response to Treatment:  Pt continues to make good progress towards goals with improved flexibility and strength. Pt still fatigues quickly with band work. · Compliance with Program/Exercises: Will assess as treatment progresses. · Recommendations/Intent for next treatment session: \"Next visit will focus on advancements to more challenging activities\". Plan to try balance work again next session.   Total Treatment Duration:  55 minutes  PT Patient Time In/Time Out  Time In: 0850  Time Out: Daphney GARCIA Eastman [address], PT

## 2018-07-06 ENCOUNTER — HOSPITAL ENCOUNTER (OUTPATIENT)
Dept: PHYSICAL THERAPY | Age: 83
Discharge: HOME | End: 2018-07-06
Payer: MEDICARE

## 2018-07-06 PROCEDURE — 97110 THERAPEUTIC EXERCISES: CPT

## 2018-07-06 NOTE — PROGRESS NOTES
Chu Mode  : 1935  [place]  Secondary: Artur Lewis at [address]  [phone]   [phone]          OUTPATIENT PHYSICAL THERAPY:Daily Note 2018 6   ICD-10: Treatment Diagnosis: Low back pain (M54.5), Pain in L hip (M25.552), difficulty walking, not elsewhere classified (R26.2)  Precautions/Allergies:   Review of patient's allergies indicates no known allergies. Fall Risk Score: 7 (? 5 = High Risk)  MD Orders: Eval and treat MEDICAL/REFERRING DIAGNOSIS:  Low back pain [M54.5]   DATE OF ONSET: over a year ago  REFERRING PHYSICIAN: Adelita Randolph MD  RETURN PHYSICIAN APPOINTMENT: End      INITIAL ASSESSMENT:  Mr. Oleg Ortiz presents with long history of LBP that has increased over the past year. Pt did have PT about 1 year ago, but doesn't remember much due to dementia symptoms. Pt has fallen two times in the past year per daughter, Roberto Johnston, who was present during evaluation. Pt with point specific pain over L SI joint area. Pt also a long time golfer, and reports his x-ray of back \"looked really bad. \" Pt presents with increased pain, decreased strength, ROM, and gait. Pt will benefit from skilled PT to address these deficits. PROBLEM LIST (Impacting functional limitations):  1. Decreased Strength  2. Decreased Balance  3. Increased Pain  4. Decreased Activity Tolerance  5. Decreased Flexibility/Joint Mobility  6. Decreased Knowledge of Precautions  7. Decreased [place] with Home Exercise Program  8. Decreased Cognition INTERVENTIONS PLANNED:  1. Home Exercise Program (HEP)  2. Manual Therapy  3. Neuromuscular Re-education/Strengthening  4. Range of Motion (ROM)  5. Therapeutic Activites  6. Therapeutic Exercise/Strengthening  7. Transcutaneous Electrical Nerve Stimulation (TENS)  8.  Ultrasound (US)   TREATMENT PLAN:  Effective Dates: 2018 TO 8/3/2018 (60 days). Frequency/Duration: 2 times a week for 60 Days  GOALS: (Goals have been discussed and agreed upon with patient.)  Discharge Goals: Time Frame: 8 weeks  1. Pt independent with final HEP, and he is able to perform without pain or difficulty. 2. Pt will improve trunk extension and L sidebend AROM by 15% to allow for him to bend safely without falling. 3. Pt will improve gross L LE muscle strength by 1/2 grade to allow for improved balance and safety. 4. Pt will decrease pain at worse to 4/10 to allow for improved quality of living. 5. Pt will improve MORALES by 10 points to allow for improved ADLs. Rehabilitation Potential For Stated Goals: Fair              The information in this section was collected on 6/4/18 (except where otherwise noted). HISTORY:   History of Present Injury/Illness (Reason for Referral):  Pt with long history of LBP that has worsened over the past year. Past Medical History/Comorbidities:   Mr. Valerie Escobar  has a past medical history of Abnormal cardiovascular function study (3/18/2016); Arthritis; Coronary atherosclerosis of native coronary vessel (3/18/2016); Coronary atherosclerosis of native coronary vessel (3/18/2016); Dyspnea on exertion; HLD (hyperlipidemia) (3/18/2016); Hypertension; Seizures ([address] and Upper respiratory infection. Mr. Valerie Escobar  has a past surgical history that includes hx orthopaedic and hx other surgical.  Social History/Living Environment:     Pt lives at the [facility] in an apartment with wife. Prior Level of Function/Work/Activity:  Independent with all ADLs and gait. Dominant Side:         LEFT  Current Medications:       Current Outpatient Prescriptions:     celecoxib (CELEBREX) 200 mg capsule, Take  by mouth two (2) times a day., Disp: , Rfl:     tamsulosin (FLOMAX) 0.4 mg capsule, Take 0.4 mg by mouth daily. , Disp: , Rfl:     metoprolol succinate (TOPROL-XL) 25 mg XL tablet, Take 1 Tab by mouth daily. , Disp: 90 Tab, Rfl: 3   atorvastatin (LIPITOR) 40 mg tablet, Take 1 Tab by mouth daily. , Disp: 30 Tab, Rfl: 5    donepezil (ARICEPT) 5 mg tablet, 10 mg., Disp: , Rfl:     lisinopril-hydrochlorothiazide (PRINZIDE, ZESTORETIC) 20-12.5 mg per tablet, , Disp: , Rfl:     traZODone (DESYREL) 100 mg tablet, , Disp: , Rfl:    Date Last Reviewed:  7/6/2018     Number of Personal Factors/Comorbidities that affect the Plan of Care: 1-2: MODERATE COMPLEXITY   EXAMINATION:   Observation/Orthostatic Postural Assessment:          Pt with L posterior pelvic rotation and slight curve in L-spine  Palpation:          Tenderness at L SI joint  ROM:          Trunk extension decreased 75%, Trunk L sidebend decreased 15%, all others WFL. Strength:          L LE grossly 4+/5   Body Structures Involved:  1. Bones  2. Joints  3. Muscles  4. Ligaments Body Functions Affected:  1. Neuromusculoskeletal Activities and Participation Affected:  1. Mobility  2. Self Care  3. Community, Social and [place]   Number of elements (examined above) that affect the Plan of Care: 3: MODERATE COMPLEXITY   CLINICAL PRESENTATION:   Presentation: Evolving clinical presentation with changing clinical characteristics: MODERATE COMPLEXITY   CLINICAL DECISION MAKING:   Outcome Measure: Tool Used: Modified Oswestry Low Back Pain Questionnaire  Score:  Initial: 24/50  Most Recent: X/50 (Date: -- )   Interpretation of Score: Each section is scored on a 0-5 scale, 5 representing the greatest disability. The scores of each section are added together for a total score of 50. Score 0 1-10 11-20 21-30 31-40 41-49 50   Modifier CH CI CJ CK CL CM CN     ?  Mobility - Walking and Moving Around:     - CURRENT STATUS: CK - 40%-59% impaired, limited or restricted    - GOAL STATUS: CJ - 20%-39% impaired, limited or restricted    - D/C STATUS:  ---------------To be determined---------------    Medical Necessity:   · Patient is expected to demonstrate progress in strength, range of motion, balance, coordination and functional technique to improve safety during gait and ADLs. Reason for Services/Other Comments:  · Patient continues to require modification of therapeutic interventions to increase complexity of exercises. Use of outcome tool(s) and clinical judgement create a POC that gives a: Questionable prediction of patient's progress: MODERATE COMPLEXITY            TREATMENT:   (In addition to Assessment/Re-Assessment sessions the following treatments were rendered)  Pre-treatment Symptoms/Complaints:  Pt reports he can really tell a difference with less pain in low back. Pain: Initial:   Pain Intensity 1: 1  Pain Location 1: Back  Pain Orientation 1: Left  Pain Intervention(s) 1: Exercise  Post Session:  Pain decreased to 1/10     THERAPEUTIC EXERCISE: (55 minutes):  Exercises per grid below to improve mobility, strength, balance and coordination. Required maximal visual, verbal and manual cues to promote proper body alignment, promote proper body posture and promote proper body mechanics. Progressed complexity of movement as indicated.      Date:  6/29/18 (4) Date:  7/2/18 (5) Date:  7/6/18 (6)   Activity/Exercise      bridging 2 x 10 reps for improved strength and decreased pain 2 x 10 reps for improved strength and decreased pain 2 x 15 reps for improved strength and decreased pain   Supine trunk rotation B 5 x 15 second holds for improved flexibility B 5 x 15 second holds for improved flexibility B 5 x 20 second holds for improved flexibility   Knee to chest stretch B 5 x 15 second holds for improved flexibility B 5 x 15 second holds for improved flexibility B 5 x 20 second holds for improved flexibility   Supine Hamstring stretch B 5 x 15 second holds for improved flexibility B 4 x 20 second holds for improved flexibility B 5 x 20 second holds for improved flexibility   Butterfly stretch supine B 5 x 15 second holds for improved flexibility B 4 x 20 second holds for improved flexibility B 5 x 20 second holds for improved flexibility   Supine SLR B 5 x 15 second holds for improved flexibility B 2 x 5 reps for improved strength    Seated hip flexion with band B 2 x 5 reps with red band for improved strength B 2 x 5 reps with red band for improved strength B 2 x 8 reps with red band for improved strength   Seated hip step outs with band B 2 x 5 reps with red band for improved strength B 2 x 5 reps with red band for improved strength B 2 x 8 reps with red band for improved strength   hooklying hip abduction with band B 2 x 5 reps with red band for improved strength B 2 x 5 reps with red band for improved strength B 2 x 8 reps with red band for improved strength   Supine knee to chest exercise  B 2 x 5 reps  for improved strength B 2 x 8 reps for improved strength       MedBridge Portal  Treatment/Session Assessment:    · Response to Treatment:  Pt continues to make good progress towards goals with improved flexibility and strength. Pt also improving slightly with endurance, and is able to do more reps and holds on stretches. · Compliance with Program/Exercises: Yes. · Recommendations/Intent for next treatment session: \"Next visit will focus on advancements to more challenging activities\". Plan to try balance work again next session since not done this session.   Total Treatment Duration:  55 minutes  PT Patient Time In/Time Out  Time In: 0850  Time Out: Daphney GARCIA Eastman [address], PT

## 2018-07-13 ENCOUNTER — HOSPITAL ENCOUNTER (OUTPATIENT)
Dept: PHYSICAL THERAPY | Age: 83
Discharge: HOME | End: 2018-07-13
Payer: MEDICARE

## 2018-07-13 PROCEDURE — 97110 THERAPEUTIC EXERCISES: CPT

## 2018-07-13 NOTE — PROGRESS NOTES
Brandon Lewis  : 1935  [place]  Secondary: Artur Lewis at [address] [place] [address]  [phone]   [phone]          OUTPATIENT PHYSICAL THERAPY:Daily Note 2018 7   ICD-10: Treatment Diagnosis: Low back pain (M54.5), Pain in L hip (M25.552), difficulty walking, not elsewhere classified (R26.2)  Precautions/Allergies:   Review of patient's allergies indicates no known allergies. Fall Risk Score: 7 (? 5 = High Risk)  MD Orders: Eval and treat MEDICAL/REFERRING DIAGNOSIS:  Low back pain [M54.5]   DATE OF ONSET: over a year ago  REFERRING PHYSICIAN: Priscila De La O MD  RETURN PHYSICIAN APPOINTMENT: End      INITIAL ASSESSMENT:  Mr. Mattie Diamond presents with long history of LBP that has increased over the past year. Pt did have PT about 1 year ago, but doesn't remember much due to dementia symptoms. Pt has fallen two times in the past year per daughter, Enrico Hernandez, who was present during evaluation. Pt with point specific pain over L SI joint area. Pt also a long time golfer, and reports his x-ray of back \"looked really bad. \" Pt presents with increased pain, decreased strength, ROM, and gait. Pt will benefit from skilled PT to address these deficits. PROBLEM LIST (Impacting functional limitations):  1. Decreased Strength  2. Decreased Balance  3. Increased Pain  4. Decreased Activity Tolerance  5. Decreased Flexibility/Joint Mobility  6. Decreased Knowledge of Precautions  7. Decreased [place] with Home Exercise Program  8. Decreased Cognition INTERVENTIONS PLANNED:  1. Home Exercise Program (HEP)  2. Manual Therapy  3. Neuromuscular Re-education/Strengthening  4. Range of Motion (ROM)  5. Therapeutic Activites  6. Therapeutic Exercise/Strengthening  7. Transcutaneous Electrical Nerve Stimulation (TENS)  8.  Ultrasound (US)   TREATMENT PLAN:  Effective Dates: 2018 TO 8/3/2018 (60 days). Frequency/Duration: 2 times a week for 60 Days  GOALS: (Goals have been discussed and agreed upon with patient.)  Discharge Goals: Time Frame: 8 weeks  1. Pt independent with final HEP, and he is able to perform without pain or difficulty. 2. Pt will improve trunk extension and L sidebend AROM by 15% to allow for him to bend safely without falling. 3. Pt will improve gross L LE muscle strength by 1/2 grade to allow for improved balance and safety. 4. Pt will decrease pain at worse to 4/10 to allow for improved quality of living. 5. Pt will improve MORALES by 10 points to allow for improved ADLs. Rehabilitation Potential For Stated Goals: Fair              The information in this section was collected on 6/4/18 (except where otherwise noted). HISTORY:   History of Present Injury/Illness (Reason for Referral):  Pt with long history of LBP that has worsened over the past year. Past Medical History/Comorbidities:   Mr. Riddhi Chang  has a past medical history of Abnormal cardiovascular function study (3/18/2016); Arthritis; Coronary atherosclerosis of native coronary vessel (3/18/2016); Coronary atherosclerosis of native coronary vessel (3/18/2016); Dyspnea on exertion; HLD (hyperlipidemia) (3/18/2016); Hypertension; Seizures ([address] and Upper respiratory infection. Mr. Riddhi Chang  has a past surgical history that includes hx orthopaedic and hx other surgical.  Social History/Living Environment:     Pt lives at the [facility] [place] in an apartment with wife. Prior Level of Function/Work/Activity:  Independent with all ADLs and gait. Dominant Side:         LEFT  Current Medications:       Current Outpatient Prescriptions:     celecoxib (CELEBREX) 200 mg capsule, Take  by mouth two (2) times a day., Disp: , Rfl:     tamsulosin (FLOMAX) 0.4 mg capsule, Take 0.4 mg by mouth daily. , Disp: , Rfl:     metoprolol succinate (TOPROL-XL) 25 mg XL tablet, Take 1 Tab by mouth daily. , Disp: 90 Tab, Rfl: 3   atorvastatin (LIPITOR) 40 mg tablet, Take 1 Tab by mouth daily. , Disp: 30 Tab, Rfl: 5    donepezil (ARICEPT) 5 mg tablet, 10 mg., Disp: , Rfl:     lisinopril-hydrochlorothiazide (PRINZIDE, ZESTORETIC) 20-12.5 mg per tablet, , Disp: , Rfl:     traZODone (DESYREL) 100 mg tablet, , Disp: , Rfl:    Date Last Reviewed:  7/13/2018     Number of Personal Factors/Comorbidities that affect the Plan of Care: 1-2: MODERATE COMPLEXITY   EXAMINATION:   Observation/Orthostatic Postural Assessment:          Pt with L posterior pelvic rotation and slight curve in L-spine  Palpation:          Tenderness at L SI joint  ROM:          Trunk extension decreased 75%, Trunk L sidebend decreased 15%, all others WFL. Strength:          L LE grossly 4+/5   Body Structures Involved:  1. Bones  2. Joints  3. Muscles  4. Ligaments Body Functions Affected:  1. Neuromusculoskeletal Activities and Participation Affected:  1. Mobility  2. Self Care  3. Community, Social and [place]   Number of elements (examined above) that affect the Plan of Care: 3: MODERATE COMPLEXITY   CLINICAL PRESENTATION:   Presentation: Evolving clinical presentation with changing clinical characteristics: MODERATE COMPLEXITY   CLINICAL DECISION MAKING:   Outcome Measure: Tool Used: Modified Oswestry Low Back Pain Questionnaire  Score:  Initial: 24/50  Most Recent: X/50 (Date: -- )   Interpretation of Score: Each section is scored on a 0-5 scale, 5 representing the greatest disability. The scores of each section are added together for a total score of 50. Score 0 1-10 11-20 21-30 31-40 41-49 50   Modifier CH CI CJ CK CL CM CN     ?  Mobility - Walking and Moving Around:     - CURRENT STATUS: CK - 40%-59% impaired, limited or restricted    - GOAL STATUS: CJ - 20%-39% impaired, limited or restricted    - D/C STATUS:  ---------------To be determined---------------    Medical Necessity:   · Patient is expected to demonstrate progress in strength, range of motion, balance, coordination and functional technique to improve safety during gait and ADLs. Reason for Services/Other Comments:  · Patient continues to require modification of therapeutic interventions to increase complexity of exercises. Use of outcome tool(s) and clinical judgement create a POC that gives a: Questionable prediction of patient's progress: MODERATE COMPLEXITY            TREATMENT:   (In addition to Assessment/Re-Assessment sessions the following treatments were rendered)  Pre-treatment Symptoms/Complaints:  Pt reports he can really tell a difference with less pain in low back. Pain: Initial:   Pain Intensity 1: 1  Pain Location 1: Back  Pain Orientation 1: Left  Pain Intervention(s) 1: Exercise  Post Session:  Pain decreased to 1/10     THERAPEUTIC EXERCISE: (55 minutes):  Exercises per grid below to improve mobility, strength, balance and coordination. Required maximal visual, verbal and manual cues to promote proper body alignment, promote proper body posture and promote proper body mechanics. Progressed complexity of movement as indicated.      Date:  7/2/18 (5) Date:  7/6/18 (6) Date:  7/13/18 (7)   Activity/Exercise      bridging 2 x 10 reps for improved strength and decreased pain 2 x 15 reps for improved strength and decreased pain 2 x 15 reps for improved strength and decreased pain   Supine trunk rotation B 5 x 15 second holds for improved flexibility B 5 x 20 second holds for improved flexibility B 5 x 20 second holds for improved flexibility   Knee to chest stretch B 5 x 15 second holds for improved flexibility B 5 x 20 second holds for improved flexibility B 5 x 20 second holds for improved flexibility   Supine Hamstring stretch B 4 x 20 second holds for improved flexibility B 5 x 20 second holds for improved flexibility    Butterfly stretch supine B 4 x 20 second holds for improved flexibility B 5 x 20 second holds for improved flexibility    Supine SLR B 2 x 5 reps for improved strength     Seated hip flexion with band B 2 x 5 reps with red band for improved strength B 2 x 8 reps with red band for improved strength B 2 x 8 reps with red band for improved strength   Seated hip step outs with band B 2 x 5 reps with red band for improved strength B 2 x 8 reps with red band for improved strength B 2 x 8 reps with red band for improved strength   hooklying hip abduction with band B 2 x 5 reps with red band for improved strength B 2 x 8 reps with red band for improved strength    Supine knee to chest exercise B 2 x 5 reps  for improved strength B 2 x 8 reps for improved strength    Romberg balance with and without head movements   2 x 20 seconds each for improved balance and decreased risk of falls   Modified tandem stance   2 x 20 seconds each for improved balance and decreased risk of falls   Single leg stance   2 x 20 seconds each for improved balance and decreased risk of falls   squats   B 2 x 10 reps for improved strength and balance   4 way hip standing   B 2 x 10 reps for improved strength and balance       [facility]  Treatment/Session Assessment:    · Response to Treatment:  Pt continues to make good progress towards goals with improved flexibility and strength. Pt tolerated balance exercises well today, but he did have to use both hands for balance. · Compliance with Program/Exercises: Yes. · Recommendations/Intent for next treatment session: \"Next visit will focus on advancements to more challenging activities\". Continue with overall strengthening and balance exercises as tolerated.   Total Treatment Duration:  55 minutes  PT Patient Time In/Time Out  Time In: 1255  Time Out: [place], PT

## 2018-07-18 ENCOUNTER — HOSPITAL ENCOUNTER (OUTPATIENT)
Dept: PHYSICAL THERAPY | Age: 83
Discharge: HOME | End: 2018-07-18
Payer: MEDICARE

## 2018-07-18 PROCEDURE — 97110 THERAPEUTIC EXERCISES: CPT

## 2018-07-18 NOTE — PROGRESS NOTES
Adam Lucio  : 1935  [place]  Secondary: Artur Lewis at [address]  [phone]   [phone]          OUTPATIENT PHYSICAL THERAPY:Daily Note 2018 8   ICD-10: Treatment Diagnosis: Low back pain (M54.5), Pain in L hip (M25.552), difficulty walking, not elsewhere classified (R26.2)  Precautions/Allergies:   Review of patient's allergies indicates no known allergies. Fall Risk Score: 7 (? 5 = High Risk)  MD Orders: Eval and treat MEDICAL/REFERRING DIAGNOSIS:  Low back pain [M54.5]   DATE OF ONSET: over a year ago  REFERRING PHYSICIAN: Elisa Ramirez MD  RETURN PHYSICIAN APPOINTMENT: End      INITIAL ASSESSMENT:  Mr. Shaina Hanna presents with long history of LBP that has increased over the past year. Pt did have PT about 1 year ago, but doesn't remember much due to dementia symptoms. Pt has fallen two times in the past year per daughter, Jacoby Ferris, who was present during evaluation. Pt with point specific pain over L SI joint area. Pt also a long time golfer, and reports his x-ray of back \"looked really bad. \" Pt presents with increased pain, decreased strength, ROM, and gait. Pt will benefit from skilled PT to address these deficits. PROBLEM LIST (Impacting functional limitations):  1. Decreased Strength  2. Decreased Balance  3. Increased Pain  4. Decreased Activity Tolerance  5. Decreased Flexibility/Joint Mobility  6. Decreased Knowledge of Precautions  7. Decreased [place] with Home Exercise Program  8. Decreased Cognition INTERVENTIONS PLANNED:  1. Home Exercise Program (HEP)  2. Manual Therapy  3. Neuromuscular Re-education/Strengthening  4. Range of Motion (ROM)  5. Therapeutic Activites  6. Therapeutic Exercise/Strengthening  7. Transcutaneous Electrical Nerve Stimulation (TENS)  8.  Ultrasound (US)   TREATMENT PLAN:  Effective Dates: 2018 TO 8/3/2018 (60 days). Frequency/Duration: 2 times a week for 60 Days  GOALS: (Goals have been discussed and agreed upon with patient.)  Discharge Goals: Time Frame: 8 weeks  1. Pt independent with final HEP, and he is able to perform without pain or difficulty. 2. Pt will improve trunk extension and L sidebend AROM by 15% to allow for him to bend safely without falling. 3. Pt will improve gross L LE muscle strength by 1/2 grade to allow for improved balance and safety. 4. Pt will decrease pain at worse to 4/10 to allow for improved quality of living. 5. Pt will improve MORALES by 10 points to allow for improved ADLs. Rehabilitation Potential For Stated Goals: Fair              The information in this section was collected on 6/4/18 (except where otherwise noted). HISTORY:   History of Present Injury/Illness (Reason for Referral):  Pt with long history of LBP that has worsened over the past year. Past Medical History/Comorbidities:   Mr. Shirley Vance  has a past medical history of Abnormal cardiovascular function study (3/18/2016); Arthritis; Coronary atherosclerosis of native coronary vessel (3/18/2016); Coronary atherosclerosis of native coronary vessel (3/18/2016); Dyspnea on exertion; HLD (hyperlipidemia) (3/18/2016); Hypertension; Seizures ([address] and Upper respiratory infection. Mr. Shirley Vance  has a past surgical history that includes hx orthopaedic and hx other surgical.  Social History/Living Environment:     Pt lives at the [place] in an apartment with wife. Prior Level of Function/Work/Activity:  Independent with all ADLs and gait. Dominant Side:         LEFT  Current Medications:       Current Outpatient Prescriptions:     celecoxib (CELEBREX) 200 mg capsule, Take  by mouth two (2) times a day., Disp: , Rfl:     tamsulosin (FLOMAX) 0.4 mg capsule, Take 0.4 mg by mouth daily. , Disp: , Rfl:     metoprolol succinate (TOPROL-XL) 25 mg XL tablet, Take 1 Tab by mouth daily. , Disp: 90 Tab, Rfl: 3   atorvastatin (LIPITOR) 40 mg tablet, Take 1 Tab by mouth daily. , Disp: 30 Tab, Rfl: 5    donepezil (ARICEPT) 5 mg tablet, 10 mg., Disp: , Rfl:     lisinopril-hydrochlorothiazide (PRINZIDE, ZESTORETIC) 20-12.5 mg per tablet, , Disp: , Rfl:     traZODone (DESYREL) 100 mg tablet, , Disp: , Rfl:    Date Last Reviewed:  7/18/2018     Number of Personal Factors/Comorbidities that affect the Plan of Care: 1-2: MODERATE COMPLEXITY   EXAMINATION:   Observation/Orthostatic Postural Assessment:          Pt with L posterior pelvic rotation and slight curve in L-spine  Palpation:          Tenderness at L SI joint  ROM:          Trunk extension decreased 75%, Trunk L sidebend decreased 15%, all others WFL. Strength:          L LE grossly 4+/5   Body Structures Involved:  1. Bones  2. Joints  3. Muscles  4. Ligaments Body Functions Affected:  1. Neuromusculoskeletal Activities and Participation Affected:  1. Mobility  2. Self Care  3. Community, Social and [place]   Number of elements (examined above) that affect the Plan of Care: 3: MODERATE COMPLEXITY   CLINICAL PRESENTATION:   Presentation: Evolving clinical presentation with changing clinical characteristics: MODERATE COMPLEXITY   CLINICAL DECISION MAKING:   Outcome Measure: Tool Used: Modified Oswestry Low Back Pain Questionnaire  Score:  Initial: 24/50  Most Recent: X/50 (Date: -- )   Interpretation of Score: Each section is scored on a 0-5 scale, 5 representing the greatest disability. The scores of each section are added together for a total score of 50. Score 0 1-10 11-20 21-30 31-40 41-49 50   Modifier CH CI CJ CK CL CM CN     ?  Mobility - Walking and Moving Around:     - CURRENT STATUS: CK - 40%-59% impaired, limited or restricted    - GOAL STATUS: CJ - 20%-39% impaired, limited or restricted    - D/C STATUS:  ---------------To be determined---------------    Medical Necessity:   · Patient is expected to demonstrate progress in strength, range of motion, balance, coordination and functional technique to improve safety during gait and ADLs. Reason for Services/Other Comments:  · Patient continues to require modification of therapeutic interventions to increase complexity of exercises. Use of outcome tool(s) and clinical judgement create a POC that gives a: Questionable prediction of patient's progress: MODERATE COMPLEXITY            TREATMENT:   (In addition to Assessment/Re-Assessment sessions the following treatments were rendered)  Pre-treatment Symptoms/Complaints:  Pt reports he can really tell a difference with less pain in low back, but the left side still hurts some. Pain: Initial:   Pain Intensity 1: 2  Pain Location 1: Back  Pain Orientation 1: Left  Pain Intervention(s) 1: Exercise  Post Session:  Pain decreased to 1/10     THERAPEUTIC EXERCISE: (55 minutes):  Exercises per grid below to improve mobility, strength, balance and coordination. Required maximal visual, verbal and manual cues to promote proper body alignment, promote proper body posture and promote proper body mechanics. Progressed complexity of movement as indicated.      Date:  7/6/18 (6) Date:  7/13/18 (7) Date:  7/18/18 (8)   Activity/Exercise      bridging 2 x 15 reps for improved strength and decreased pain 2 x 15 reps for improved strength and decreased pain 2 x 15 reps for improved strength and decreased pain   Supine trunk rotation B 5 x 20 second holds for improved flexibility B 5 x 20 second holds for improved flexibility B 5 x 20 second holds for improved flexibility   Knee to chest stretch B 5 x 20 second holds for improved flexibility B 5 x 20 second holds for improved flexibility B 5 x 20 second holds for improved flexibility   Supine Hamstring stretch B 5 x 20 second holds for improved flexibility     Butterfly stretch supine B 5 x 20 second holds for improved flexibility     Supine SLR      Seated hip flexion with band B 2 x 8 reps with red band for improved strength B 2 x 8 reps with red band for improved strength B 2 x 10 reps with red band for improved strength   Seated hip step outs with band B 2 x 8 reps with red band for improved strength B 2 x 8 reps with red band for improved strength B 2 x 10 reps with red band for improved strength   hooklying hip abduction with band B 2 x 8 reps with red band for improved strength     Supine knee to chest exercise B 2 x 8 reps for improved strength     Romberg balance with and without head movements  2 x 20 seconds each for improved balance and decreased risk of falls 2 x 20 seconds each for improved balance and decreased risk of falls   Modified tandem stance  2 x 20 seconds each for improved balance and decreased risk of falls 2 x 20 seconds each for improved balance and decreased risk of falls   Single leg stance  2 x 20 seconds each for improved balance and decreased risk of falls 2 x 20 seconds each for improved balance and decreased risk of falls   squats  B 2 x 10 reps for improved strength and balance B 2 x 10 reps for improved strength and balance   4 way hip standing  B 2 x 10 reps for improved strength and balance B 2 x 10 reps for improved strength and balance       [facility]  Treatment/Session Assessment:    · Response to Treatment:  Pt continues to make good progress towards goals with improved flexibility and strength. Pt tolerated balance exercises well today, but continues to have to use both hands for balance. · Compliance with Program/Exercises: Yes. · Recommendations/Intent for next treatment session: \"Next visit will focus on advancements to more challenging activities\". Continue with overall strengthening and core exercises as tolerated.   Total Treatment Duration:  55 minutes  PT Patient Time In/Time Out  Time In: 0900  Time Out: [facility] [place], [place]

## 2018-07-20 ENCOUNTER — HOSPITAL ENCOUNTER (OUTPATIENT)
Dept: PHYSICAL THERAPY | Age: 83
Discharge: HOME | End: 2018-07-20
Payer: MEDICARE

## 2018-07-20 PROCEDURE — 97110 THERAPEUTIC EXERCISES: CPT

## 2018-07-20 PROCEDURE — G8979 MOBILITY GOAL STATUS: HCPCS

## 2018-07-20 PROCEDURE — G8978 MOBILITY CURRENT STATUS: HCPCS

## 2018-07-20 NOTE — THERAPY RECERTIFICATION
Primitivo Yoo  : 1935  [place]  Secondary: Artur Lewis at [address]  [phone]   [phone]          OUTPATIENT PHYSICAL THERAPY:Daily Note, Progress Report and Recertification 3/14/3211 9   ICD-10: Treatment Diagnosis: Low back pain (M54.5), Pain in L hip (M25.552), difficulty walking, not elsewhere classified (R26.2)  Precautions/Allergies:   Review of patient's allergies indicates no known allergies. Fall Risk Score: 7 (? 5 = High Risk)  MD Orders: Eval and treat MEDICAL/REFERRING DIAGNOSIS:  Low back pain [M54.5]   DATE OF ONSET: over a year ago  REFERRING PHYSICIAN: Radhames Rivera MD  RETURN PHYSICIAN APPOINTMENT: End of      INITIAL ASSESSMENT:  Mr. Amina Serrato presents with long history of LBP that has increased over the past year. Pt did have PT about 1 year ago, but doesn't remember much due to dementia symptoms. Pt has fallen two times in the past year per daughter, Nikki Guzman, who was present during evaluation. Pt with point specific pain over L SI joint area. Pt also a long time golfer, and reports his x-ray of back \"looked really bad. \" Pt presents with increased pain, decreased strength, ROM, and gait. Pt will benefit from skilled PT to address these deficits. PROBLEM LIST (Impacting functional limitations):  1. Decreased Strength  2. Decreased Balance  3. Increased Pain  4. Decreased Activity Tolerance  5. Decreased Flexibility/Joint Mobility  6. Decreased Knowledge of Precautions  7. Decreased [place] with Home Exercise Program  8. Decreased Cognition INTERVENTIONS PLANNED:  1. Home Exercise Program (HEP)  2. Manual Therapy  3. Neuromuscular Re-education/Strengthening  4. Range of Motion (ROM)  5. Therapeutic Activites  6. Therapeutic Exercise/Strengthening  7. Transcutaneous Electrical Nerve Stimulation (TENS)  8.  Ultrasound (US) TREATMENT PLAN:  Effective Dates: 7/20/2018 TO 9/3/2018 (60 days). Frequency/Duration: 1 - 2 times a week for 45 Days Updated: 7/20/18  GOALS: (Goals have been discussed and agreed upon with patient.)  Discharge Goals: Time Frame: 8 weeks   1. Pt independent with final HEP, and he is able to perform without pain or difficulty. (Progressing towards 7/20/18)  2. Pt will improve trunk extension and L sidebend AROM by 15% to allow for him to bend safely without falling. (Progressing towards 7/20/18)  3. Pt will improve gross L LE muscle strength by 1/2 grade to allow for improved balance and safety. (Met 7/20/18)  4. Pt will decrease pain at worse to 4/10 to allow for improved quality of living. (Met 7/20/18)  5. Pt will improve MORALES by 10 points to allow for improved ADLs. (Met 7/20/28)  Rehabilitation Potential For Stated Goals: Fair    Progress Note/Recertification 9/27/00: Pt has made great overall progress meeting 3 of his LTGs, and progressing towards the others. Pt improved his MORALES by 10 points, and he has been able to increase his activities with little pain. Pt still is having \"bad days\" where pain in L LB increases, but the pain intensity is less. Pt would benefit from continued PT to meet final goals. The information in this section was collected on 6/4/18 (except where otherwise noted). HISTORY:   History of Present Injury/Illness (Reason for Referral):  Pt with long history of LBP that has worsened over the past year. Past Medical History/Comorbidities:   Mr. Maddie Russell  has a past medical history of Abnormal cardiovascular function study (3/18/2016); Arthritis; Coronary atherosclerosis of native coronary vessel (3/18/2016); Coronary atherosclerosis of native coronary vessel (3/18/2016); Dyspnea on exertion; HLD (hyperlipidemia) (3/18/2016); Hypertension; Seizures ([facility] [address] and Upper respiratory infection.   Mr. Maddie Russell  has a past surgical history that includes hx orthopaedic and hx other surgical.  Social History/Living Environment:     Pt lives at the [facility] in an apartment with wife. Prior Level of Function/Work/Activity:  Independent with all ADLs and gait. Dominant Side:         LEFT  Current Medications:       Current Outpatient Prescriptions:     celecoxib (CELEBREX) 200 mg capsule, Take  by mouth two (2) times a day., Disp: , Rfl:     tamsulosin (FLOMAX) 0.4 mg capsule, Take 0.4 mg by mouth daily. , Disp: , Rfl:     metoprolol succinate (TOPROL-XL) 25 mg XL tablet, Take 1 Tab by mouth daily. , Disp: 90 Tab, Rfl: 3    atorvastatin (LIPITOR) 40 mg tablet, Take 1 Tab by mouth daily. , Disp: 30 Tab, Rfl: 5    donepezil (ARICEPT) 5 mg tablet, 10 mg., Disp: , Rfl:     lisinopril-hydrochlorothiazide (PRINZIDE, ZESTORETIC) 20-12.5 mg per tablet, , Disp: , Rfl:     traZODone (DESYREL) 100 mg tablet, , Disp: , Rfl:    Date Last Reviewed:  7/20/2018     Number of Personal Factors/Comorbidities that affect the Plan of Care: 1-2: MODERATE COMPLEXITY   EXAMINATION:   Observation/Orthostatic Postural Assessment:          Pt with L posterior pelvic rotation and slight curve in L-spine 7/20/18: Same  Palpation:          Tenderness at L SI joint 7/20/18: Improved. ROM:          Trunk extension decreased 75%, Trunk L sidebend decreased 15%, all others WFL. 7/20/18: Trunk extention decreased 50%, L sidebend decreased 10%  Strength:          L LE grossly 4+/5 7/20/18: grossly 5/5   Body Structures Involved:  1. Bones  2. Joints  3. Muscles  4. Ligaments Body Functions Affected:  1. Neuromusculoskeletal Activities and Participation Affected:  1. Mobility  2. Self Care  3. Community, Social and [place]   Number of elements (examined above) that affect the Plan of Care: 3: MODERATE COMPLEXITY   CLINICAL PRESENTATION:   Presentation: Evolving clinical presentation with changing clinical characteristics: MODERATE COMPLEXITY   CLINICAL DECISION MAKING:   Outcome Measure:    Tool Used: Modified Oswestry Low Back Pain Questionnaire  Score:  Initial: 24/50  Most Recent: 14/50 (Date: 7/20/18 )   Interpretation of Score: Each section is scored on a 0-5 scale, 5 representing the greatest disability. The scores of each section are added together for a total score of 50. Score 0 1-10 11-20 21-30 31-40 41-49 50   Modifier CH CI CJ CK CL CM CN     ? Mobility - Walking and Moving Around:     - CURRENT STATUS: CJ - 20%-39% impaired, limited or restricted    - GOAL STATUS: CJ - 20%-39% impaired, limited or restricted    - D/C STATUS:  ---------------To be determined---------------    Medical Necessity:   · Patient is expected to demonstrate progress in strength, range of motion, balance, coordination and functional technique to improve safety during gait and ADLs. Reason for Services/Other Comments:  · Patient continues to require modification of therapeutic interventions to increase complexity of exercises. Use of outcome tool(s) and clinical judgement create a POC that gives a: Questionable prediction of patient's progress: MODERATE COMPLEXITY            TREATMENT:   (In addition to Assessment/Re-Assessment sessions the following treatments were rendered)  Pre-treatment Symptoms/Complaints:  Pt reports he feels like he is able to do much more activities with less pain, and sometimes he has no pain at all. Pain: Initial:   Pain Intensity 1: 1  Pain Location 1: Back  Pain Orientation 1: Left  Pain Intervention(s) 1: Exercise  Post Session:  Pain decreased to 1/10     THERAPEUTIC EXERCISE: (55 minutes):  Exercises per grid below to improve mobility, strength, balance and coordination. Required maximal visual, verbal and manual cues to promote proper body alignment, promote proper body posture and promote proper body mechanics. Progressed complexity of movement as indicated.      Date:  7/13/18 (7) Date:  7/18/18 (8) Date:  7/20/18 (9)   Activity/Exercise      bridging 2 x 15 reps for improved strength and decreased pain 2 x 15 reps for improved strength and decreased pain 2 x 15 reps for improved strength and decreased pain   Supine trunk rotation B 5 x 20 second holds for improved flexibility B 5 x 20 second holds for improved flexibility B 5 x 20 second holds for improved flexibility   Knee to chest stretch B 5 x 20 second holds for improved flexibility B 5 x 20 second holds for improved flexibility B 5 x 20 second holds for improved flexibility   Supine Hamstring stretch      Butterfly stretch supine      Supine SLR      Seated hip flexion with band B 2 x 8 reps with red band for improved strength B 2 x 10 reps with red band for improved strength B 2 x 10 reps with red band for improved strength   Seated hip step outs with band B 2 x 8 reps with red band for improved strength B 2 x 10 reps with red band for improved strength B 2 x 10 reps with red band for improved strength   hooklying hip abduction with band      Supine knee to chest exercise      Romberg balance with and without head movements 2 x 20 seconds each for improved balance and decreased risk of falls 2 x 20 seconds each for improved balance and decreased risk of falls 2 x 20 seconds each for improved balance and decreased risk of falls   Modified tandem stance 2 x 20 seconds each for improved balance and decreased risk of falls 2 x 20 seconds each for improved balance and decreased risk of falls 2 x 20 seconds each for improved balance and decreased risk of falls   Single leg stance 2 x 20 seconds each for improved balance and decreased risk of falls 2 x 20 seconds each for improved balance and decreased risk of falls 2 x 20 seconds each for improved balance and decreased risk of falls   squats B 2 x 10 reps for improved strength and balance B 2 x 10 reps for improved strength and balance B 2 x 10 reps for improved strength and balance   4 way hip standing B 2 x 10 reps for improved strength and balance B 2 x 10 reps for improved strength and balance Standing heel/toe raises   B 2 x 10 reps for improved strength and balance       [facility] Portal  Treatment/Session Assessment:    · Response to Treatment:  Pt has made great overall progress meeting 3 of his LTGs, and progressing towards the others. Pt improved his MORALES by 10 points, and he has been able to increase his activities with little pain. Pt still is having \"bad days\" where pain in L LB increases, but the pain intensity is less. Pt would benefit from continued PT to meet final goals. · Compliance with Program/Exercises: Yes. · Recommendations/Intent for next treatment session: \"Next visit will focus on advancements to more challenging activities\". Continue with overall strengthening and core exercises as tolerated.   Total Treatment Duration:  55 minutes  PT Patient Time In/Time Out  Time In: 0905  Time Out: [facility],

## 2018-07-25 ENCOUNTER — APPOINTMENT (OUTPATIENT)
Dept: PHYSICAL THERAPY | Age: 83
End: 2018-07-25
Payer: MEDICARE

## 2018-07-27 ENCOUNTER — APPOINTMENT (OUTPATIENT)
Dept: PHYSICAL THERAPY | Age: 83
End: 2018-07-27
Payer: MEDICARE

## 2018-07-31 ENCOUNTER — APPOINTMENT (RX ONLY)
Dept: URBAN - METROPOLITAN AREA CLINIC 349 | Facility: CLINIC | Age: 83
Setting detail: DERMATOLOGY
End: 2018-07-31

## 2018-07-31 DIAGNOSIS — D22 MELANOCYTIC NEVI: ICD-10-CM | Status: STABLE

## 2018-07-31 DIAGNOSIS — Z85.828 PERSONAL HISTORY OF OTHER MALIGNANT NEOPLASM OF SKIN: ICD-10-CM

## 2018-07-31 DIAGNOSIS — L57.0 ACTINIC KERATOSIS: ICD-10-CM

## 2018-07-31 PROBLEM — L85.3 XEROSIS CUTIS: Status: ACTIVE | Noted: 2018-07-31

## 2018-07-31 PROBLEM — D22.62 MELANOCYTIC NEVI OF LEFT UPPER LIMB, INCLUDING SHOULDER: Status: ACTIVE | Noted: 2018-07-31

## 2018-07-31 PROBLEM — D22.71 MELANOCYTIC NEVI OF RIGHT LOWER LIMB, INCLUDING HIP: Status: ACTIVE | Noted: 2018-07-31

## 2018-07-31 PROBLEM — D22.72 MELANOCYTIC NEVI OF LEFT LOWER LIMB, INCLUDING HIP: Status: ACTIVE | Noted: 2018-07-31

## 2018-07-31 PROBLEM — D22.61 MELANOCYTIC NEVI OF RIGHT UPPER LIMB, INCLUDING SHOULDER: Status: ACTIVE | Noted: 2018-07-31

## 2018-07-31 PROBLEM — D22.39 MELANOCYTIC NEVI OF OTHER PARTS OF FACE: Status: ACTIVE | Noted: 2018-07-31

## 2018-07-31 PROBLEM — D22.5 MELANOCYTIC NEVI OF TRUNK: Status: ACTIVE | Noted: 2018-07-31

## 2018-07-31 PROBLEM — E78.5 HYPERLIPIDEMIA, UNSPECIFIED: Status: ACTIVE | Noted: 2018-07-31

## 2018-07-31 PROCEDURE — ? LIQUID NITROGEN

## 2018-07-31 PROCEDURE — 99213 OFFICE O/P EST LOW 20 MIN: CPT | Mod: 25

## 2018-07-31 PROCEDURE — ? COUNSELING

## 2018-07-31 PROCEDURE — ? BODY PHOTOGRAPHY

## 2018-07-31 PROCEDURE — 17004 DESTROY PREMAL LESIONS 15/>: CPT

## 2018-07-31 ASSESSMENT — LOCATION DETAILED DESCRIPTION DERM
LOCATION DETAILED: LEFT ULNAR DORSAL HAND
LOCATION DETAILED: RIGHT INFERIOR HELIX
LOCATION DETAILED: RIGHT SUPERIOR HELIX
LOCATION DETAILED: RIGHT ANTERIOR EARLOBE
LOCATION DETAILED: LEFT SUPERIOR PARIETAL SCALP
LOCATION DETAILED: LEFT INFERIOR LATERAL MALAR CHEEK
LOCATION DETAILED: LEFT MEDIAL FRONTAL SCALP
LOCATION DETAILED: LEFT CENTRAL TEMPLE
LOCATION DETAILED: LEFT SUPERIOR FOREHEAD
LOCATION DETAILED: LEFT DISTAL DORSAL FOREARM
LOCATION DETAILED: RIGHT DISTAL POSTERIOR THIGH
LOCATION DETAILED: LEFT DISTAL RADIAL DORSAL FOREARM
LOCATION DETAILED: LEFT DISTAL LATERAL POSTERIOR THIGH
LOCATION DETAILED: LEFT FOREHEAD
LOCATION DETAILED: RIGHT SUPERIOR LATERAL LOWER BACK
LOCATION DETAILED: POSTERIOR MID-PARIETAL SCALP
LOCATION DETAILED: LEFT PROXIMAL POSTERIOR UPPER ARM
LOCATION DETAILED: RIGHT PROXIMAL POSTERIOR UPPER ARM
LOCATION DETAILED: LEFT SUPERIOR MEDIAL FOREHEAD
LOCATION DETAILED: RIGHT SUPERIOR PARIETAL SCALP
LOCATION DETAILED: LEFT SUPERIOR PREAURICULAR CHEEK

## 2018-07-31 ASSESSMENT — LOCATION SIMPLE DESCRIPTION DERM
LOCATION SIMPLE: POSTERIOR SCALP
LOCATION SIMPLE: LEFT HAND
LOCATION SIMPLE: LEFT FOREARM
LOCATION SIMPLE: LEFT POSTERIOR THIGH
LOCATION SIMPLE: LEFT FOREHEAD
LOCATION SIMPLE: LEFT UPPER ARM
LOCATION SIMPLE: RIGHT UPPER ARM
LOCATION SIMPLE: LEFT CHEEK
LOCATION SIMPLE: LEFT TEMPLE
LOCATION SIMPLE: LEFT SCALP
LOCATION SIMPLE: RIGHT LOWER BACK
LOCATION SIMPLE: SCALP
LOCATION SIMPLE: RIGHT POSTERIOR THIGH
LOCATION SIMPLE: RIGHT EAR

## 2018-07-31 ASSESSMENT — LOCATION ZONE DERM
LOCATION ZONE: HAND
LOCATION ZONE: EAR
LOCATION ZONE: TRUNK
LOCATION ZONE: ARM
LOCATION ZONE: LEG
LOCATION ZONE: SCALP
LOCATION ZONE: FACE

## 2018-07-31 NOTE — PROCEDURE: BODY PHOTOGRAPHY
Detail Level: Detailed
Reason For Photography: The patient is obtaining body photography to observe existing suspicious moles and or monitor for the appearance of any new lesions.
Whole Body Statement: The whole body was photographed today.
Was The Entire Body Photographed (Cannot Bill Unless Entire Body Photographed)?: No
Number Of Photographs (Optional- Will Not Render If 0): 1
Consent: Written consent obtained, risks reviewed for whole body photography. Patient understands that photograph costs may not be covered by insurance, and patient is ultimately responsible for payment.

## 2018-07-31 NOTE — PROCEDURE: LIQUID NITROGEN
Detail Level: Detailed
Post-Care Instructions: I reviewed with the patient in detail post-care instructions. Patient is to wear sunprotection, and avoid picking at any of the treated lesions. Pt may apply Vaseline to crusted or scabbing areas.
Consent: The patient's consent was obtained including but not limited to risks of crusting, scabbing, blistering, scarring, darker or lighter pigmentary change, recurrence, incomplete removal and infection.
Number Of Freeze-Thaw Cycles: 2 freeze-thaw cycles
Render Post-Care Instructions In Note?: no
Duration Of Freeze Thaw-Cycle (Seconds): 3

## 2018-08-01 ENCOUNTER — APPOINTMENT (OUTPATIENT)
Dept: PHYSICAL THERAPY | Age: 83
End: 2018-08-01
Payer: MEDICARE

## 2018-08-06 ENCOUNTER — HOSPITAL ENCOUNTER (OUTPATIENT)
Dept: PHYSICAL THERAPY | Age: 83
Discharge: HOME | End: 2018-08-06
Payer: MEDICARE

## 2018-08-06 PROCEDURE — 97110 THERAPEUTIC EXERCISES: CPT

## 2018-08-06 NOTE — PROGRESS NOTES
Domonique Baldwin  : 1935  [place]  Secondary: Artur Lewis at [address] [place] [address]  [phone]   [phone]          OUTPATIENT PHYSICAL THERAPY:Daily Note 2018 10   ICD-10: Treatment Diagnosis: Low back pain (M54.5), Pain in L hip (M25.552), difficulty walking, not elsewhere classified (R26.2)  Precautions/Allergies:   Review of patient's allergies indicates no known allergies. Fall Risk Score: 7 (? 5 = High Risk)  MD Orders: Eval and treat MEDICAL/REFERRING DIAGNOSIS:  Low back pain [M54.5]   DATE OF ONSET: over a year ago  REFERRING PHYSICIAN: Jhon Banks MD  RETURN PHYSICIAN APPOINTMENT: End      INITIAL ASSESSMENT:  Mr. Sapphire Mix presents with long history of LBP that has increased over the past year. Pt did have PT about 1 year ago, but doesn't remember much due to dementia symptoms. Pt has fallen two times in the past year per daughter, Teri Diego, who was present during evaluation. Pt with point specific pain over L SI joint area. Pt also a long time golfer, and reports his x-ray of back \"looked really bad. \" Pt presents with increased pain, decreased strength, ROM, and gait. Pt will benefit from skilled PT to address these deficits. PROBLEM LIST (Impacting functional limitations):  1. Decreased Strength  2. Decreased Balance  3. Increased Pain  4. Decreased Activity Tolerance  5. Decreased Flexibility/Joint Mobility  6. Decreased Knowledge of Precautions  7. Decreased [place] with Home Exercise Program  8. Decreased Cognition INTERVENTIONS PLANNED:  1. Home Exercise Program (HEP)  2. Manual Therapy  3. Neuromuscular Re-education/Strengthening  4. Range of Motion (ROM)  5. Therapeutic Activites  6. Therapeutic Exercise/Strengthening  7. Transcutaneous Electrical Nerve Stimulation (TENS)  8.  Ultrasound (US)   TREATMENT PLAN:  Effective Dates: 2018 TO 9/3/2018 (60 days). Frequency/Duration: 1 - 2 times a week for 45 Days Updated: 7/20/18  GOALS: (Goals have been discussed and agreed upon with patient.)  Discharge Goals: Time Frame: 8 weeks   1. Pt independent with final HEP, and he is able to perform without pain or difficulty. (Progressing towards 7/20/18)  2. Pt will improve trunk extension and L sidebend AROM by 15% to allow for him to bend safely without falling. (Progressing towards 7/20/18)  3. Pt will improve gross L LE muscle strength by 1/2 grade to allow for improved balance and safety. (Met 7/20/18)  4. Pt will decrease pain at worse to 4/10 to allow for improved quality of living. (Met 7/20/18)  5. Pt will improve MORALES by 10 points to allow for improved ADLs. (Met 7/20/28)  Rehabilitation Potential For Stated Goals: Fair    Progress Note/Recertification 3/71/87: Pt has made great overall progress meeting 3 of his LTGs, and progressing towards the others. Pt improved his MORALES by 10 points, and he has been able to increase his activities with little pain. Pt still is having \"bad days\" where pain in L LB increases, but the pain intensity is less. Pt would benefit from continued PT to meet final goals. The information in this section was collected on 6/4/18 (except where otherwise noted). HISTORY:   History of Present Injury/Illness (Reason for Referral):  Pt with long history of LBP that has worsened over the past year. Past Medical History/Comorbidities:   Mr. Osiris Florence  has a past medical history of Abnormal cardiovascular function study (3/18/2016); Arthritis; Coronary atherosclerosis of native coronary vessel (3/18/2016); Coronary atherosclerosis of native coronary vessel (3/18/2016); Dyspnea on exertion; HLD (hyperlipidemia) (3/18/2016); Hypertension; Seizures ([facility] [address] and Upper respiratory infection.   Mr. Osiris Florence  has a past surgical history that includes hx orthopaedic and hx other surgical.  Social History/Living Environment: Pt lives at the [facility] in an apartment with wife. Prior Level of Function/Work/Activity:  Independent with all ADLs and gait. Dominant Side:         LEFT  Current Medications:       Current Outpatient Prescriptions:     celecoxib (CELEBREX) 200 mg capsule, Take  by mouth two (2) times a day., Disp: , Rfl:     tamsulosin (FLOMAX) 0.4 mg capsule, Take 0.4 mg by mouth daily. , Disp: , Rfl:     metoprolol succinate (TOPROL-XL) 25 mg XL tablet, Take 1 Tab by mouth daily. , Disp: 90 Tab, Rfl: 3    atorvastatin (LIPITOR) 40 mg tablet, Take 1 Tab by mouth daily. , Disp: 30 Tab, Rfl: 5    donepezil (ARICEPT) 5 mg tablet, 10 mg., Disp: , Rfl:     lisinopril-hydrochlorothiazide (PRINZIDE, ZESTORETIC) 20-12.5 mg per tablet, , Disp: , Rfl:     traZODone (DESYREL) 100 mg tablet, , Disp: , Rfl:    Date Last Reviewed:  8/6/2018     Number of Personal Factors/Comorbidities that affect the Plan of Care: 1-2: MODERATE COMPLEXITY   EXAMINATION:   Observation/Orthostatic Postural Assessment:          Pt with L posterior pelvic rotation and slight curve in L-spine 7/20/18: Same  Palpation:          Tenderness at L SI joint 7/20/18: Improved. ROM:          Trunk extension decreased 75%, Trunk L sidebend decreased 15%, all others WFL. 7/20/18: Trunk extention decreased 50%, L sidebend decreased 10%  Strength:          L LE grossly 4+/5 7/20/18: grossly 5/5   Body Structures Involved:  1. Bones  2. Joints  3. Muscles  4. Ligaments Body Functions Affected:  1. Neuromusculoskeletal Activities and Participation Affected:  1. Mobility  2. Self Care  3. Community, Social and [place]   Number of elements (examined above) that affect the Plan of Care: 3: MODERATE COMPLEXITY   CLINICAL PRESENTATION:   Presentation: Evolving clinical presentation with changing clinical characteristics: MODERATE COMPLEXITY   CLINICAL DECISION MAKING:   Outcome Measure:    Tool Used: Modified Oswestry Low Back Pain Questionnaire  Score:  Initial: 24/50 Most Recent: 14/50 (Date: 7/20/18 )   Interpretation of Score: Each section is scored on a 0-5 scale, 5 representing the greatest disability. The scores of each section are added together for a total score of 50. Score 0 1-10 11-20 21-30 31-40 41-49 50   Modifier CH CI CJ CK CL CM CN     ? Mobility - Walking and Moving Around:     - CURRENT STATUS: CJ - 20%-39% impaired, limited or restricted    - GOAL STATUS: CJ - 20%-39% impaired, limited or restricted    - D/C STATUS:  ---------------To be determined---------------    Medical Necessity:   · Patient is expected to demonstrate progress in strength, range of motion, balance, coordination and functional technique to improve safety during gait and ADLs. Reason for Services/Other Comments:  · Patient continues to require modification of therapeutic interventions to increase complexity of exercises. Use of outcome tool(s) and clinical judgement create a POC that gives a: Questionable prediction of patient's progress: MODERATE COMPLEXITY            TREATMENT:   (In addition to Assessment/Re-Assessment sessions the following treatments were rendered)  Pre-treatment Symptoms/Complaints:  Pt reports he feels pretty good overall. He still has some pain, but not nearly as bad. Pain: Initial:   Pain Intensity 1: 1  Pain Location 1: Back  Pain Orientation 1: Left  Pain Intervention(s) 1: Exercise  Post Session:  Pain decreased to 1/10     THERAPEUTIC EXERCISE: (45 minutes):  Exercises per grid below to improve mobility, strength, balance and coordination. Required maximal visual, verbal and manual cues to promote proper body alignment, promote proper body posture and promote proper body mechanics. Progressed complexity of movement as indicated.      Date:  7/18/18 (8) Date:  7/20/18 (9) Date:  8/6/18 (10)   Activity/Exercise      bridging 2 x 15 reps for improved strength and decreased pain 2 x 15 reps for improved strength and decreased pain 2 x 15 reps for improved strength and decreased pain   Supine trunk rotation B 5 x 20 second holds for improved flexibility B 5 x 20 second holds for improved flexibility B 5 x 20 second holds for improved flexibility   Knee to chest stretch B 5 x 20 second holds for improved flexibility B 5 x 20 second holds for improved flexibility B 5 x 20 second holds for improved flexibility   Seated hip flexion with band B 2 x 10 reps with red band for improved strength B 2 x 10 reps with red band for improved strength B 2 x 10 reps with red band for improved strength   Seated hip step outs with band B 2 x 10 reps with red band for improved strength B 2 x 10 reps with red band for improved strength B 2 x 10 reps with red band for improved strength   Supine knee to chest exercise   2 x 15 reps for improved strength and decreased pain   Romberg balance with and without head movements 2 x 20 seconds each for improved balance and decreased risk of falls 2 x 20 seconds each for improved balance and decreased risk of falls    Modified tandem stance 2 x 20 seconds each for improved balance and decreased risk of falls 2 x 20 seconds each for improved balance and decreased risk of falls    Single leg stance 2 x 20 seconds each for improved balance and decreased risk of falls 2 x 20 seconds each for improved balance and decreased risk of falls    squats B 2 x 10 reps for improved strength and balance B 2 x 10 reps for improved strength and balance B 2 x 10 reps for improved strength and balance   4 way hip standing B 2 x 10 reps for improved strength and balance     Standing heel/toe raises  B 2 x 10 reps for improved strength and balance B 2 x 10 reps for improved strength and balance       [facility] Portal  Treatment/Session Assessment:    · Response to Treatment:  Pt has significant increased swelling/fluid in L LE. He is taking a \"water pill\" for it, but has not seen his medical doctor yet. Pt also brought in braces sent to his house. Donned/doffed braces for pt, and educated on use. Pt with some increased cognitive issues today with memory recall. · Compliance with Program/Exercises: Yes. · Recommendations/Intent for next treatment session: \"Next visit will focus on advancements to more challenging activities\". Continue with overall strengthening and core exercises as tolerated 1 time a week for 4 more weeks.   Total Treatment Duration:  45 minutes  PT Patient Time In/Time Out  Time In: 1000  Time Out: [place] [id number], PT

## 2018-08-13 ENCOUNTER — HOSPITAL ENCOUNTER (OUTPATIENT)
Dept: PHYSICAL THERAPY | Age: 83
Discharge: HOME | End: 2018-08-13
Payer: MEDICARE

## 2018-08-13 NOTE — PROGRESS NOTES
Beatris Jenkins  : 1935  [place]  Secondary: Artur [address] at [facility]  [address] [place] [address]  [phone]   [phone]        OUTPATIENT DAILY NOTE    NAME/AGE/GENDER: Beatris Jenkins is a 81 y.o. male. DATE: 2018    Patient cancelled for appointment today due to pt fell and not feeling well. Will plan to follow up on next scheduled visit.     Arlen Pedro, PT

## 2018-08-20 ENCOUNTER — HOSPITAL ENCOUNTER (OUTPATIENT)
Dept: PHYSICAL THERAPY | Age: 83
Discharge: HOME | End: 2018-08-20
Payer: MEDICARE

## 2018-08-20 PROCEDURE — 97110 THERAPEUTIC EXERCISES: CPT

## 2018-08-20 NOTE — PROGRESS NOTES
Richard   : 1935  [place]  Secondary: Artur Lewis at [address]  [phone]   [phone]          OUTPATIENT PHYSICAL THERAPY:Daily Note 2018 11   ICD-10: Treatment Diagnosis: Low back pain (M54.5), Pain in L hip (M25.552), difficulty walking, not elsewhere classified (R26.2)  Precautions/Allergies:   Review of patient's allergies indicates no known allergies. Fall Risk Score: 7 (? 5 = High Risk)  MD Orders: Eval and treat MEDICAL/REFERRING DIAGNOSIS:  Low back pain [M54.5]   DATE OF ONSET: over a year ago  REFERRING PHYSICIAN: Dylan Coombs MD  RETURN PHYSICIAN APPOINTMENT: End      INITIAL ASSESSMENT:  Mr. Hood Dietrich presents with long history of LBP that has increased over the past year. Pt did have PT about 1 year ago, but doesn't remember much due to dementia symptoms. Pt has fallen two times in the past year per daughter, Adams Chanel, who was present during evaluation. Pt with point specific pain over L SI joint area. Pt also a long time golfer, and reports his x-ray of back \"looked really bad. \" Pt presents with increased pain, decreased strength, ROM, and gait. Pt will benefit from skilled PT to address these deficits. PROBLEM LIST (Impacting functional limitations):  1. Decreased Strength  2. Decreased Balance  3. Increased Pain  4. Decreased Activity Tolerance  5. Decreased Flexibility/Joint Mobility  6. Decreased Knowledge of Precautions  7. Decreased [place] with Home Exercise Program  8. Decreased Cognition INTERVENTIONS PLANNED:  1. Home Exercise Program (HEP)  2. Manual Therapy  3. Neuromuscular Re-education/Strengthening  4. Range of Motion (ROM)  5. Therapeutic Activites  6. Therapeutic Exercise/Strengthening  7. Transcutaneous Electrical Nerve Stimulation (TENS)  8.  Ultrasound (US)   TREATMENT PLAN:  Effective Dates: 7/20/2018 TO 9/3/2018 (60 days). Frequency/Duration: 1 - 2 times a week for 45 Days Updated: 7/20/18  GOALS: (Goals have been discussed and agreed upon with patient.)  Discharge Goals: Time Frame: 8 weeks   1. Pt independent with final HEP, and he is able to perform without pain or difficulty. (Progressing towards 7/20/18)  2. Pt will improve trunk extension and L sidebend AROM by 15% to allow for him to bend safely without falling. (Progressing towards 7/20/18)  3. Pt will improve gross L LE muscle strength by 1/2 grade to allow for improved balance and safety. (Met 7/20/18)  4. Pt will decrease pain at worse to 4/10 to allow for improved quality of living. (Met 7/20/18)  5. Pt will improve MORALES by 10 points to allow for improved ADLs. (Met 7/20/28)  Rehabilitation Potential For Stated Goals: Fair    Progress Note/Recertification 6/16/22: Pt has made great overall progress meeting 3 of his LTGs, and progressing towards the others. Pt improved his MORALES by 10 points, and he has been able to increase his activities with little pain. Pt still is having \"bad days\" where pain in L LB increases, but the pain intensity is less. Pt would benefit from continued PT to meet final goals. The information in this section was collected on 6/4/18 (except where otherwise noted). HISTORY:   History of Present Injury/Illness (Reason for Referral):  Pt with long history of LBP that has worsened over the past year. Past Medical History/Comorbidities:   Mr. Riddhi Chang  has a past medical history of Abnormal cardiovascular function study (3/18/2016); Arthritis; Coronary atherosclerosis of native coronary vessel (3/18/2016); Coronary atherosclerosis of native coronary vessel (3/18/2016); Dyspnea on exertion; HLD (hyperlipidemia) (3/18/2016); Hypertension; Seizures ([address] and Upper respiratory infection.   Mr. Riddhi Chang  has a past surgical history that includes hx orthopaedic and hx other surgical.  Social History/Living Environment:     Pt lives at the [place] in an apartment with wife. Prior Level of Function/Work/Activity:  Independent with all ADLs and gait. Dominant Side:         LEFT  Current Medications:       Current Outpatient Prescriptions:     celecoxib (CELEBREX) 200 mg capsule, Take  by mouth two (2) times a day., Disp: , Rfl:     tamsulosin (FLOMAX) 0.4 mg capsule, Take 0.4 mg by mouth daily. , Disp: , Rfl:     metoprolol succinate (TOPROL-XL) 25 mg XL tablet, Take 1 Tab by mouth daily. , Disp: 90 Tab, Rfl: 3    atorvastatin (LIPITOR) 40 mg tablet, Take 1 Tab by mouth daily. , Disp: 30 Tab, Rfl: 5    donepezil (ARICEPT) 5 mg tablet, 10 mg., Disp: , Rfl:     lisinopril-hydrochlorothiazide (PRINZIDE, ZESTORETIC) 20-12.5 mg per tablet, , Disp: , Rfl:     traZODone (DESYREL) 100 mg tablet, , Disp: , Rfl:    Date Last Reviewed:  8/20/2018     Number of Personal Factors/Comorbidities that affect the Plan of Care: 1-2: MODERATE COMPLEXITY   EXAMINATION:   Observation/Orthostatic Postural Assessment:          Pt with L posterior pelvic rotation and slight curve in L-spine 7/20/18: Same  Palpation:          Tenderness at L SI joint 7/20/18: Improved. ROM:          Trunk extension decreased 75%, Trunk L sidebend decreased 15%, all others WFL. 7/20/18: Trunk extention decreased 50%, L sidebend decreased 10%  Strength:          L LE grossly 4+/5 7/20/18: grossly 5/5   Body Structures Involved:  1. Bones  2. Joints  3. Muscles  4. Ligaments Body Functions Affected:  1. Neuromusculoskeletal Activities and Participation Affected:  1. Mobility  2. Self Care  3. Community, Social and [place]   Number of elements (examined above) that affect the Plan of Care: 3: MODERATE COMPLEXITY   CLINICAL PRESENTATION:   Presentation: Evolving clinical presentation with changing clinical characteristics: MODERATE COMPLEXITY   CLINICAL DECISION MAKING:   Outcome Measure:    Tool Used: Modified Oswestry Low Back Pain Questionnaire  Score: Initial: 24/50  Most Recent: 14/50 (Date: 7/20/18 )   Interpretation of Score: Each section is scored on a 0-5 scale, 5 representing the greatest disability. The scores of each section are added together for a total score of 50. Score 0 1-10 11-20 21-30 31-40 41-49 50   Modifier CH CI CJ CK CL CM CN     ? Mobility - Walking and Moving Around:     - CURRENT STATUS: CJ - 20%-39% impaired, limited or restricted    - GOAL STATUS: CJ - 20%-39% impaired, limited or restricted    - D/C STATUS:  ---------------To be determined---------------    Medical Necessity:   · Patient is expected to demonstrate progress in strength, range of motion, balance, coordination and functional technique to improve safety during gait and ADLs. Reason for Services/Other Comments:  · Patient continues to require modification of therapeutic interventions to increase complexity of exercises. Use of outcome tool(s) and clinical judgement create a POC that gives a: Questionable prediction of patient's progress: MODERATE COMPLEXITY            TREATMENT:   (In addition to Assessment/Re-Assessment sessions the following treatments were rendered)  Pre-treatment Symptoms/Complaints:  Pt reports he fell the night before last session, and that's why he didn't come. Feeling better, but still sore from it. Pain: Initial:   Pain Intensity 1: 2  Pain Location 1: Back  Pain Orientation 1: Left  Pain Intervention(s) 1: Exercise  Post Session:  Pain decreased to 1/10     THERAPEUTIC EXERCISE: (45 minutes):  Exercises per grid below to improve mobility, strength, balance and coordination. Required maximal visual, verbal and manual cues to promote proper body alignment, promote proper body posture and promote proper body mechanics. Progressed complexity of movement as indicated.      Date:  7/20/18 (9) Date:  8/6/18 (10) Date:  8/20/18 (11)   Activity/Exercise      bridging 2 x 15 reps for improved strength and decreased pain 2 x 15 reps for improved strength and decreased pain 2 x 15 reps for improved strength and decreased pain   Supine trunk rotation B 5 x 20 second holds for improved flexibility B 5 x 20 second holds for improved flexibility B 5 x 20 second holds for improved flexibility   Knee to chest stretch B 5 x 20 second holds for improved flexibility B 5 x 20 second holds for improved flexibility B 5 x 20 second holds for improved flexibility   Seated hip flexion with band B 2 x 10 reps with red band for improved strength B 2 x 10 reps with red band for improved strength B 2 x 10 reps with red band for improved strength   Seated hip step outs with band B 2 x 10 reps with red band for improved strength B 2 x 10 reps with red band for improved strength B 2 x 10 reps with red band for improved strength   Supine knee to chest exercise  2 x 15 reps for improved strength and decreased pain 2 x 15 reps for improved strength and decreased pain   Romberg balance with and without head movements 2 x 20 seconds each for improved balance and decreased risk of falls     Modified tandem stance 2 x 20 seconds each for improved balance and decreased risk of falls     Single leg stance 2 x 20 seconds each for improved balance and decreased risk of falls     squats B 2 x 10 reps for improved strength and balance B 2 x 10 reps for improved strength and balance    4 way hip standing      Standing heel/toe raises B 2 x 10 reps for improved strength and balance B 2 x 10 reps for improved strength and balance B 2 x 10 reps for improved strength and balance   Standing hamstring curls   B 2 x 10 reps for improved strength and balance       [facility] Portal  Treatment/Session Assessment:    · Response to Treatment:  Pt has continued significant increased swelling/fluid in L LE. Pt with wraps around both LE today, and reports he is going back to vein doctor tomorrow for follow up. Pt with continued increased cognitive issues today with memory recall.   · Compliance with Program/Exercises: Yes. · Recommendations/Intent for next treatment session: \"Next visit will focus on advancements to more challenging activities\". Continue with overall strengthening and core exercises as tolerated.   Total Treatment Duration:  45 minutes  PT Patient Time In/Time Out  Time In: 1055  Time Out: [address] [place], VICTOR MANUEL

## 2018-08-27 ENCOUNTER — HOSPITAL ENCOUNTER (OUTPATIENT)
Dept: PHYSICAL THERAPY | Age: 83
Discharge: HOME | End: 2018-08-27
Payer: MEDICARE

## 2018-08-27 PROCEDURE — G8978 MOBILITY CURRENT STATUS: HCPCS

## 2018-08-27 PROCEDURE — G8979 MOBILITY GOAL STATUS: HCPCS

## 2018-08-27 PROCEDURE — 97110 THERAPEUTIC EXERCISES: CPT

## 2018-08-27 NOTE — PROGRESS NOTES
Adam Lucio  : 1935  [place]  Secondary: Artur Lewis at [address]  [phone]   [phone]          OUTPATIENT PHYSICAL THERAPY:Daily Note and Progress Report 2018 12   ICD-10: Treatment Diagnosis: Low back pain (M54.5), Pain in L hip (M25.552), difficulty walking, not elsewhere classified (R26.2)  Precautions/Allergies:   Review of patient's allergies indicates no known allergies. Fall Risk Score: 7 (? 5 = High Risk)  MD Orders: Eval and treat MEDICAL/REFERRING DIAGNOSIS:  Low back pain [M54.5]   DATE OF ONSET: over a year ago  REFERRING PHYSICIAN: Elisa Ramirez MD  RETURN PHYSICIAN APPOINTMENT: End      INITIAL ASSESSMENT:  Mr. Shaina Hanna presents with long history of LBP that has increased over the past year. Pt did have PT about 1 year ago, but doesn't remember much due to dementia symptoms. Pt has fallen two times in the past year per daughter, Jacoby Ferris, who was present during evaluation. Pt with point specific pain over L SI joint area. Pt also a long time golfer, and reports his x-ray of back \"looked really bad. \" Pt presents with increased pain, decreased strength, ROM, and gait. Pt will benefit from skilled PT to address these deficits. PROBLEM LIST (Impacting functional limitations):  1. Decreased Strength  2. Decreased Balance  3. Increased Pain  4. Decreased Activity Tolerance  5. Decreased Flexibility/Joint Mobility  6. Decreased Knowledge of Precautions  7. Decreased [place] with Home Exercise Program  8. Decreased Cognition INTERVENTIONS PLANNED:  1. Home Exercise Program (HEP)  2. Manual Therapy  3. Neuromuscular Re-education/Strengthening  4. Range of Motion (ROM)  5. Therapeutic Activites  6. Therapeutic Exercise/Strengthening  7. Transcutaneous Electrical Nerve Stimulation (TENS)  8.  Ultrasound (US)   TREATMENT PLAN:  Effective Dates: 7/20/2018 TO 9/3/2018 (60 days). Frequency/Duration: 1 - 2 times a week for 45 Days Updated: 7/20/18  GOALS: (Goals have been discussed and agreed upon with patient.)  Discharge Goals: Time Frame: 8 weeks   1. Pt independent with final HEP, and he is able to perform without pain or difficulty. (met 8/27/18)  2. Pt will improve trunk extension and L sidebend AROM by 15% to allow for him to bend safely without falling.(met 8/27/18)  3. Pt will improve gross L LE muscle strength by 1/2 grade to allow for improved balance and safety. (Met 7/20/18)  4. Pt will decrease pain at worse to 4/10 to allow for improved quality of living. (Met 7/20/18)  5. Pt will improve MORALES by 10 points to allow for improved ADLs. (Met 7/20/28)  Rehabilitation Potential For Stated Goals: Fair    Progress Note/Recertification 7/77/96: Pt has made great overall progress meeting 3 of his LTGs, and progressing towards the others. Pt improved his MORALES by 10 points, and he has been able to increase his activities with little pain. Pt still is having \"bad days\" where pain in L LB increases, but the pain intensity is less. Pt would benefit from continued PT to meet final goals. The information in this section was collected on 6/4/18 (except where otherwise noted). HISTORY:   History of Present Injury/Illness (Reason for Referral):  Pt with long history of LBP that has worsened over the past year. Past Medical History/Comorbidities:   Mr. Elidia Morrell  has a past medical history of Abnormal cardiovascular function study (3/18/2016); Arthritis; Coronary atherosclerosis of native coronary vessel (3/18/2016); Coronary atherosclerosis of native coronary vessel (3/18/2016); Dyspnea on exertion; HLD (hyperlipidemia) (3/18/2016); Hypertension; Seizures ([facility] [address] and Upper respiratory infection.   Mr. Elidia Morrell  has a past surgical history that includes hx orthopaedic and hx other surgical.  Social History/Living Environment: Pt lives at the [facility] in an apartment with wife. Prior Level of Function/Work/Activity:  Independent with all ADLs and gait. Dominant Side:         LEFT  Current Medications:       Current Outpatient Prescriptions:     celecoxib (CELEBREX) 200 mg capsule, Take  by mouth two (2) times a day., Disp: , Rfl:     tamsulosin (FLOMAX) 0.4 mg capsule, Take 0.4 mg by mouth daily. , Disp: , Rfl:     metoprolol succinate (TOPROL-XL) 25 mg XL tablet, Take 1 Tab by mouth daily. , Disp: 90 Tab, Rfl: 3    atorvastatin (LIPITOR) 40 mg tablet, Take 1 Tab by mouth daily. , Disp: 30 Tab, Rfl: 5    donepezil (ARICEPT) 5 mg tablet, 10 mg., Disp: , Rfl:     lisinopril-hydrochlorothiazide (PRINZIDE, ZESTORETIC) 20-12.5 mg per tablet, , Disp: , Rfl:     traZODone (DESYREL) 100 mg tablet, , Disp: , Rfl:    Date Last Reviewed:  8/27/2018     Number of Personal Factors/Comorbidities that affect the Plan of Care: 1-2: MODERATE COMPLEXITY   EXAMINATION:   Observation/Orthostatic Postural Assessment:          Pt with L posterior pelvic rotation and slight curve in L-spine 7/20/18: Same  Palpation:          Tenderness at L SI joint 7/20/18: Improved. ROM:          Trunk extension decreased 75%, Trunk L sidebend decreased 15%, all others WFL. 7/20/18: Trunk extention decreased 50%, L sidebend decreased 10% 8/27/18: WFL  Strength:          L LE grossly 4+/5 7/20/18: grossly 5/5   Body Structures Involved:  1. Bones  2. Joints  3. Muscles  4. Ligaments Body Functions Affected:  1. Neuromusculoskeletal Activities and Participation Affected:  1. Mobility  2. Self Care  3. Community, Social and [place]   Number of elements (examined above) that affect the Plan of Care: 3: MODERATE COMPLEXITY   CLINICAL PRESENTATION:   Presentation: Evolving clinical presentation with changing clinical characteristics: MODERATE COMPLEXITY   CLINICAL DECISION MAKING:   Outcome Measure:    Tool Used: Modified Oswestry Low Back Pain Questionnaire  Score: Initial: 24/50  Most Recent: 12/50 (Date: 8/27/18 )   Interpretation of Score: Each section is scored on a 0-5 scale, 5 representing the greatest disability. The scores of each section are added together for a total score of 50. Score 0 1-10 11-20 21-30 31-40 41-49 50   Modifier CH CI CJ CK CL CM CN     ? Mobility - Walking and Moving Around:     - CURRENT STATUS: CJ - 20%-39% impaired, limited or restricted    - GOAL STATUS: CJ - 20%-39% impaired, limited or restricted    - D/C STATUS:  ---------------To be determined---------------    Medical Necessity:   · Patient is expected to demonstrate progress in strength, range of motion, balance, coordination and functional technique to improve safety during gait and ADLs. Reason for Services/Other Comments:  · Patient continues to require modification of therapeutic interventions to increase complexity of exercises. Use of outcome tool(s) and clinical judgement create a POC that gives a: Questionable prediction of patient's progress: MODERATE COMPLEXITY            TREATMENT:   (In addition to Assessment/Re-Assessment sessions the following treatments were rendered)  Pre-treatment Symptoms/Complaints:  Pt reports he is doing much better overall, and he feels like he does pretty well most of the time. Pain: Initial:   Pain Intensity 1: 1  Pain Location 1: Back  Pain Orientation 1: Left  Pain Intervention(s) 1: Exercise  Post Session:  Pain decreased to 1/10     THERAPEUTIC EXERCISE: (45 minutes):  Exercises per grid below to improve mobility, strength, balance and coordination. Required maximal visual, verbal and manual cues to promote proper body alignment, promote proper body posture and promote proper body mechanics. Progressed complexity of movement as indicated.      Date:  8/27/18 (12)   Activity/Exercise    bridging 2 x 15 reps for improved strength and decreased pain   Supine trunk rotation B 5 x 20 second holds for improved flexibility Knee to chest stretch B 5 x 20 second holds for improved flexibility   Seated hip flexion with band B 2 x 10 reps with red band for improved strength   Seated hip step outs with band B 2 x 10 reps with red band for improved strength   Supine knee to chest exercise 2 x 15 reps for improved strength and decreased pain   Romberg balance with and without head movements 2 x 15 seconds on each side for improved balance   Modified tandem stance 2 x 15 seconds on each side for improved balance   Single leg stance 2 x 15 seconds on each side for improved balance   squats B 2 x 10 reps for improved strength and balance       [facility] Portal  Treatment/Session Assessment:    · Response to Treatment:  Pt has continued significant increased swelling/fluid in L LE, but it has slightly decreased since last session. Pt is doing well with all exercises, and he is able to recall about 80% on his own. Pt has met all his LTGs, and he is only having minimal back pain. · Compliance with Program/Exercises: Yes. · Recommendations/Intent for next treatment session: \"Next visit will focus on advancements to more challenging activities\". Hold PT for a few weeks to allow pt to try HEP independently.   Total Treatment Duration:  45 minutes  PT Patient Time In/Time Out  Time In: 3266  Time Out: [facility], PT

## 2018-08-29 NOTE — PROGRESS NOTES
I am accessing Mr. Go's chart as a part of our department's internal chart auditing process. I certify that Mr. Michael Pizarro is, or was, a patient in our department.   Thank you,  William Carballo, PT  8/29/2018

## 2018-10-08 NOTE — THERAPY DISCHARGE
Thiago Montero  : 1935  [place]  Secondary: Artur Lewis at [facility] [address]  [phone]   [phone]          OUTPATIENT PHYSICAL THERAPY:Discharge 10/8/2018   ICD-10: Treatment Diagnosis: Low back pain (M54.5), Pain in L hip (M25.552), difficulty walking, not elsewhere classified (R26.2)  Precautions/Allergies:   Review of patient's allergies indicates no known allergies. Fall Risk Score: 7 (? 5 = High Risk)  MD Orders: Eval and treat MEDICAL/REFERRING DIAGNOSIS:  Low back pain [M54.5]   DATE OF ONSET: over a year ago  REFERRING PHYSICIAN: Konrad Ojeda MD  RETURN PHYSICIAN APPOINTMENT: End      INITIAL ASSESSMENT:  Mr. Lyle Coombs presents with long history of LBP that has increased over the past year. Pt did have PT about 1 year ago, but doesn't remember much due to dementia symptoms. Pt has fallen two times in the past year per daughter, Fariba Soto, who was present during evaluation. Pt with point specific pain over L SI joint area. Pt also a long time golfer, and reports his x-ray of back \"looked really bad. \" Pt presents with increased pain, decreased strength, ROM, and gait. Pt will benefit from skilled PT to address these deficits. PROBLEM LIST (Impacting functional limitations):  1. Decreased Strength  2. Decreased Balance  3. Increased Pain  4. Decreased Activity Tolerance  5. Decreased Flexibility/Joint Mobility  6. Decreased Knowledge of Precautions  7. Decreased [place] with Home Exercise Program  8. Decreased Cognition INTERVENTIONS PLANNED:  1. Home Exercise Program (HEP)  2. Manual Therapy  3. Neuromuscular Re-education/Strengthening  4. Range of Motion (ROM)  5. Therapeutic Activites  6. Therapeutic Exercise/Strengthening  7. Transcutaneous Electrical Nerve Stimulation (TENS)  8.  Ultrasound (US)   TREATMENT PLAN:    GOALS: (Goals have been discussed and agreed upon with patient.)  Discharge Goals: Time Frame: 8 weeks   1. Pt independent with final HEP, and he is able to perform without pain or difficulty. (met 8/27/18)  2. Pt will improve trunk extension and L sidebend AROM by 15% to allow for him to bend safely without falling.(met 8/27/18)  3. Pt will improve gross L LE muscle strength by 1/2 grade to allow for improved balance and safety. (Met 7/20/18)  4. Pt will decrease pain at worse to 4/10 to allow for improved quality of living. (Met 7/20/18)  5. Pt will improve MORALES by 10 points to allow for improved ADLs. (Met 7/20/28)  Rehabilitation Potential For Stated Goals: Fair    Progress Note/Recertification 0/03/60: Pt has made great overall progress meeting 3 of his LTGs, and progressing towards the others. Pt improved his MORALES by 10 points, and he has been able to increase his activities with little pain. Pt still is having \"bad days\" where pain in L LB increases, but the pain intensity is less. Pt would benefit from continued PT to meet final goals. Discharge Summary: 10/5/18 Chu Tim has been seen in physical therapy from 6/4/18 to 8/27/18 for 12 visits. Pt was making good progress even though he did have some set backs. He met 2 of 5 LTGs, and was progressing towards the others. Pt has  to continue program independently. Will D/C pt from PT at this time. Thank you for this referral.       Kate Munoz, PT                The information in this section was collected on 6/4/18 (except where otherwise noted). HISTORY:   History of Present Injury/Illness (Reason for Referral):  Pt with long history of LBP that has worsened over the past year. Past Medical History/Comorbidities:   Mr. Oleg Ortiz  has a past medical history of Abnormal cardiovascular function study (3/18/2016); Arthritis; Coronary atherosclerosis of native coronary vessel (3/18/2016);  Coronary atherosclerosis of native coronary vessel (3/18/2016); Dyspnea on exertion; HLD (hyperlipidemia) (3/18/2016); Hypertension; Seizures ([address] and Upper respiratory infection. Mr. Elidia Morrell  has a past surgical history that includes hx orthopaedic and hx other surgical.  Social History/Living Environment:     Pt lives at the [place] in an apartment with wife. Prior Level of Function/Work/Activity:  Independent with all ADLs and gait. Dominant Side:         LEFT  Current Medications:       Current Outpatient Prescriptions:     celecoxib (CELEBREX) 200 mg capsule, Take  by mouth two (2) times a day., Disp: , Rfl:     tamsulosin (FLOMAX) 0.4 mg capsule, Take 0.4 mg by mouth daily. , Disp: , Rfl:     metoprolol succinate (TOPROL-XL) 25 mg XL tablet, Take 1 Tab by mouth daily. , Disp: 90 Tab, Rfl: 3    atorvastatin (LIPITOR) 40 mg tablet, Take 1 Tab by mouth daily. , Disp: 30 Tab, Rfl: 5    donepezil (ARICEPT) 5 mg tablet, 10 mg., Disp: , Rfl:     lisinopril-hydrochlorothiazide (PRINZIDE, ZESTORETIC) 20-12.5 mg per tablet, , Disp: , Rfl:     traZODone (DESYREL) 100 mg tablet, , Disp: , Rfl:    Date Last Reviewed:  10/8/2018     Number of Personal Factors/Comorbidities that affect the Plan of Care: 1-2: MODERATE COMPLEXITY   EXAMINATION:   Observation/Orthostatic Postural Assessment:          Pt with L posterior pelvic rotation and slight curve in L-spine 7/20/18: Same  Palpation:          Tenderness at L SI joint 7/20/18: Improved. ROM:          Trunk extension decreased 75%, Trunk L sidebend decreased 15%, all others WFL. 7/20/18: Trunk extention decreased 50%, L sidebend decreased 10% 8/27/18: WFL  Strength:          L LE grossly 4+/5 7/20/18: grossly 5/5   Body Structures Involved:  1. Bones  2. Joints  3. Muscles  4. Ligaments Body Functions Affected:  1. Neuromusculoskeletal Activities and Participation Affected:  1. Mobility  2. Self Care  3.  Community, Social and [facility] [place]   Number of elements (examined above) that affect the Plan of Care: 3: MODERATE COMPLEXITY   CLINICAL PRESENTATION:   Presentation: Evolving clinical presentation with changing clinical characteristics: MODERATE COMPLEXITY   CLINICAL DECISION MAKING:   Outcome Measure: Tool Used: Modified Oswestry Low Back Pain Questionnaire  Score:  Initial: 24/50  Most Recent: 12/50 (Date: 8/27/18 )   Interpretation of Score: Each section is scored on a 0-5 scale, 5 representing the greatest disability. The scores of each section are added together for a total score of 50. Score 0 1-10 11-20 21-30 31-40 41-49 50   Modifier CH CI CJ CK CL CM CN     ? Mobility - Walking and Moving Around:     - CURRENT STATUS: CJ - 20%-39% impaired, limited or restricted    - GOAL STATUS: CJ - 20%-39% impaired, limited or restricted    - D/C STATUS:  ---------------To be determined---------------    Medical Necessity:   · Patient is expected to demonstrate progress in strength, range of motion, balance, coordination and functional technique to improve safety during gait and ADLs. Reason for Services/Other Comments:  · Patient continues to require modification of therapeutic interventions to increase complexity of exercises.    Use of outcome tool(s) and clinical judgement create a POC that gives a: Questionable prediction of patient's progress: MODERATE COMPLEXITY

## 2018-10-15 PROBLEM — I82.519 CHRONIC DEEP VEIN THROMBOSIS (DVT) OF FEMORAL VEIN (HCC): Chronic | Status: ACTIVE | Noted: 2018-10-15

## 2018-11-14 PROBLEM — E78.2 MIXED HYPERLIPIDEMIA: Status: ACTIVE | Noted: 2018-11-14

## 2018-11-14 PROBLEM — I10 ESSENTIAL HYPERTENSION WITH GOAL BLOOD PRESSURE LESS THAN 130/85: Status: ACTIVE | Noted: 2018-11-14

## 2019-01-10 ENCOUNTER — APPOINTMENT (RX ONLY)
Dept: URBAN - METROPOLITAN AREA CLINIC 349 | Facility: CLINIC | Age: 84
Setting detail: DERMATOLOGY
End: 2019-01-10

## 2019-01-10 DIAGNOSIS — L57.0 ACTINIC KERATOSIS: ICD-10-CM

## 2019-01-10 PROBLEM — D04.4 CARCINOMA IN SITU OF SKIN OF SCALP AND NECK: Status: ACTIVE | Noted: 2019-01-10

## 2019-01-10 PROBLEM — D48.5 NEOPLASM OF UNCERTAIN BEHAVIOR OF SKIN: Status: ACTIVE | Noted: 2019-01-10

## 2019-01-10 PROCEDURE — ? BIOPSY BY SHAVE METHOD

## 2019-01-10 PROCEDURE — 88305 TISSUE EXAM BY PATHOLOGIST: CPT

## 2019-01-10 PROCEDURE — 17003 DESTRUCT PREMALG LES 2-14: CPT

## 2019-01-10 PROCEDURE — ? PATHOLOGY BILLING

## 2019-01-10 PROCEDURE — 17000 DESTRUCT PREMALG LESION: CPT | Mod: 59

## 2019-01-10 PROCEDURE — A4550 SURGICAL TRAYS: HCPCS

## 2019-01-10 PROCEDURE — ? COUNSELING

## 2019-01-10 PROCEDURE — ? LIQUID NITROGEN

## 2019-01-10 PROCEDURE — 11102 TANGNTL BX SKIN SINGLE LES: CPT

## 2019-01-10 ASSESSMENT — LOCATION DETAILED DESCRIPTION DERM
LOCATION DETAILED: LEFT DISTAL DORSAL FOREARM
LOCATION DETAILED: LEFT SUPERIOR CENTRAL MALAR CHEEK
LOCATION DETAILED: LEFT PROXIMAL RADIAL DORSAL FOREARM

## 2019-01-10 ASSESSMENT — LOCATION ZONE DERM
LOCATION ZONE: FACE
LOCATION ZONE: ARM

## 2019-01-10 ASSESSMENT — LOCATION SIMPLE DESCRIPTION DERM
LOCATION SIMPLE: LEFT FOREARM
LOCATION SIMPLE: LEFT CHEEK

## 2019-01-10 ASSESSMENT — PAIN INTENSITY VAS: HOW INTENSE IS YOUR PAIN 0 BEING NO PAIN, 10 BEING THE MOST SEVERE PAIN POSSIBLE?: 1/10 PAIN

## 2019-01-10 NOTE — PROCEDURE: BIOPSY BY SHAVE METHOD
Detail Level: Detailed
Additional Anesthesia Volume In Cc (Will Not Render If 0): 1.5
X Size Of Lesion In Cm: 0
Bill For Surgical Tray: yes
Silver Nitrate Text: The wound bed was treated with silver nitrate after the biopsy was performed.
Biopsy Method: Personna blade
Notification Instructions: Patient will be notified of biopsy results. However, patient instructed to call the office if not contacted within 2 weeks. After the procedure, the patient was oriented to person, place, and time. Patient denied feeling dizzy, queasy, and and declined further observation after initial 5 minute observation time.
Biopsy Type: H and E
Electrodesiccation And Curettage Text: The wound bed was treated with electrodesiccation and curettage after the biopsy was performed.
Electrodesiccation Text: The wound bed was treated with electrodesiccation after the biopsy was performed.
Wound Care: Vaseline
Consent: Written consent was obtained and risks were reviewed including but not limited to scarring, infection, bleeding, scabbing, incomplete removal, nerve damage and allergy to anesthesia.
Cryotherapy Text: The wound bed was treated with cryotherapy after the biopsy was performed.
Bill 09412 For Specimen Handling/Conveyance To Laboratory?: no
Anesthesia Volume In Cc (Will Not Render If 0): 1
Billing Type: Third-Party Bill
Post-Care Instructions: I reviewed with the patient in detail post-care instructions. Patient is to keep the biopsy site dry overnight, and then apply Vaseline  daily until healed. Patient may apply hydrogen peroxide soaks to remove any crusting. After the procedure, the patient was oriented to person, place, and time. Patient denied feeling dizzy, queasy, and and declined further observation after initial 5 minute observation time.
Depth Of Biopsy: dermis
Curettage Text: The wound bed was treated with curettage after the biopsy was performed.
Dressing: bandage
Hemostasis: Aluminum Chloride
Anesthesia Type: 1% lidocaine with 1:100,000 epinephrine and a 1:10 solution of 8.4% sodium bicarbonate
Type Of Destruction Used: Curettage

## 2019-01-10 NOTE — PROCEDURE: PATHOLOGY BILLING
Immunohistochemistry ([id number] and [id number]) billing is not performed here. Please use the Immunohistochemistry Stain Billing plan to accomplish this. Immunohistochemistry ([id number] and [id number]) billing is not performed here. Please use the Immunohistochemistry Stain Billing plan to accomplish this.

## 2019-02-04 ENCOUNTER — APPOINTMENT (RX ONLY)
Dept: URBAN - METROPOLITAN AREA CLINIC 349 | Facility: CLINIC | Age: 84
Setting detail: DERMATOLOGY
End: 2019-02-04

## 2019-02-04 PROBLEM — I10 ESSENTIAL (PRIMARY) HYPERTENSION: Status: ACTIVE | Noted: 2019-02-04

## 2019-02-04 PROBLEM — D04.4 CARCINOMA IN SITU OF SKIN OF SCALP AND NECK: Status: ACTIVE | Noted: 2019-02-04

## 2019-02-04 PROCEDURE — 17273 DSTR MAL LES S/N/H/F/G 2.1-3: CPT

## 2019-02-04 PROCEDURE — ? COUNSELING

## 2019-02-04 PROCEDURE — ? CURETTAGE AND DESTRUCTION

## 2019-02-04 PROCEDURE — A4550 SURGICAL TRAYS: HCPCS

## 2019-02-04 NOTE — PROCEDURE: CURETTAGE AND DESTRUCTION
Size Of Lesion After Curettage: 2.1
Additional Information: (Optional): The wound was cleaned, and a pressure dressing was applied.  The patient received detailed post-op instructions.
Post-Care Instructions: I reviewed with the patient in detail post-care instructions. Patient is to keep the area dry for 48 hours, and not to engage in any swimming until the area is healed. Should the patient develop any fevers, chills, bleeding, severe pain patient will contact the office immediately.
Render Post-Care Instructions In Note?: no
Anesthesia Volume In Cc: 3
Bill For Surgical Tray: yes
Total Volume (Ccs): 1
Cautery Type: electrodesiccation
Bill As A Line Item Or As Units: Line Item
Anesthesia Type: 1% lidocaine with epinephrine and a 1:10 solution of 8.4% sodium bicarbonate
Detail Level: Detailed
What Was Performed First?: Curettage
Consent was obtained from the patient. The risks, benefits and alternatives to therapy were discussed in detail. Specifically, the risks of infection, scarring, bleeding, prolonged wound healing, nerve injury, incomplete removal, allergy to anesthesia and recurrence were addressed. Alternatives to ED&C, such as: surgical removal and XRT were also discussed.  Prior to the procedure, the treatment site was clearly identified and confirmed by the patient. All components of Universal Protocol/PAUSE Rule completed.

## 2019-03-19 ENCOUNTER — APPOINTMENT (RX ONLY)
Dept: URBAN - METROPOLITAN AREA CLINIC 349 | Facility: CLINIC | Age: 84
Setting detail: DERMATOLOGY
End: 2019-03-19

## 2019-03-19 DIAGNOSIS — D485 NEOPLASM OF UNCERTAIN BEHAVIOR OF SKIN: ICD-10-CM

## 2019-03-19 DIAGNOSIS — L20.89 OTHER ATOPIC DERMATITIS: ICD-10-CM

## 2019-03-19 DIAGNOSIS — D22 MELANOCYTIC NEVI: ICD-10-CM | Status: STABLE

## 2019-03-19 DIAGNOSIS — Z85.828 PERSONAL HISTORY OF OTHER MALIGNANT NEOPLASM OF SKIN: ICD-10-CM

## 2019-03-19 DIAGNOSIS — D18.0 HEMANGIOMA: ICD-10-CM

## 2019-03-19 DIAGNOSIS — L57.0 ACTINIC KERATOSIS: ICD-10-CM

## 2019-03-19 PROBLEM — E78.5 HYPERLIPIDEMIA, UNSPECIFIED: Status: ACTIVE | Noted: 2019-03-19

## 2019-03-19 PROBLEM — D22.39 MELANOCYTIC NEVI OF OTHER PARTS OF FACE: Status: ACTIVE | Noted: 2019-03-19

## 2019-03-19 PROBLEM — I10 ESSENTIAL (PRIMARY) HYPERTENSION: Status: ACTIVE | Noted: 2019-03-19

## 2019-03-19 PROBLEM — D22.5 MELANOCYTIC NEVI OF TRUNK: Status: ACTIVE | Noted: 2019-03-19

## 2019-03-19 PROBLEM — D18.01 HEMANGIOMA OF SKIN AND SUBCUTANEOUS TISSUE: Status: ACTIVE | Noted: 2019-03-19

## 2019-03-19 PROBLEM — L20.84 INTRINSIC (ALLERGIC) ECZEMA: Status: ACTIVE | Noted: 2019-03-19

## 2019-03-19 PROBLEM — D48.5 NEOPLASM OF UNCERTAIN BEHAVIOR OF SKIN: Status: ACTIVE | Noted: 2019-03-19

## 2019-03-19 PROCEDURE — 17003 DESTRUCT PREMALG LES 2-14: CPT

## 2019-03-19 PROCEDURE — ? LIQUID NITROGEN

## 2019-03-19 PROCEDURE — 17000 DESTRUCT PREMALG LESION: CPT

## 2019-03-19 PROCEDURE — ? OBSERVATION

## 2019-03-19 PROCEDURE — 99214 OFFICE O/P EST MOD 30 MIN: CPT | Mod: 25

## 2019-03-19 PROCEDURE — ? TREATMENT REGIMEN

## 2019-03-19 PROCEDURE — ? MEDICAL PHOTOGRAPHY REVIEW

## 2019-03-19 PROCEDURE — ? COUNSELING

## 2019-03-19 ASSESSMENT — LOCATION DETAILED DESCRIPTION DERM
LOCATION DETAILED: RIGHT ANTITRAGUS
LOCATION DETAILED: RIGHT INFERIOR MEDIAL MALAR CHEEK
LOCATION DETAILED: SUPERIOR MID FOREHEAD
LOCATION DETAILED: LEFT DORSAL WRIST
LOCATION DETAILED: LEFT LATERAL MALAR CHEEK
LOCATION DETAILED: LEFT MEDIAL INFERIOR CHEST
LOCATION DETAILED: LEFT DISTAL RADIAL DORSAL FOREARM
LOCATION DETAILED: LEFT SUPERIOR FOREHEAD
LOCATION DETAILED: RIGHT SUPERIOR LATERAL LOWER BACK
LOCATION DETAILED: RIGHT SUPERIOR POSTERIOR HELIX
LOCATION DETAILED: LEFT DORSAL MIDDLE METACARPOPHALANGEAL JOINT
LOCATION DETAILED: RIGHT CENTRAL ZYGOMA
LOCATION DETAILED: RIGHT VENTRAL LATERAL DISTAL FOREARM
LOCATION DETAILED: LEFT SUPERIOR PREAURICULAR CHEEK
LOCATION DETAILED: LEFT MEDIAL ZYGOMA
LOCATION DETAILED: RIGHT SUPERIOR FRONTAL SCALP

## 2019-03-19 ASSESSMENT — LOCATION ZONE DERM
LOCATION ZONE: SCALP
LOCATION ZONE: ARM
LOCATION ZONE: TRUNK
LOCATION ZONE: EAR
LOCATION ZONE: HAND
LOCATION ZONE: FACE

## 2019-03-19 ASSESSMENT — LOCATION SIMPLE DESCRIPTION DERM
LOCATION SIMPLE: LEFT WRIST
LOCATION SIMPLE: LEFT FOREARM
LOCATION SIMPLE: LEFT ZYGOMA
LOCATION SIMPLE: LEFT CHEEK
LOCATION SIMPLE: RIGHT FOREARM
LOCATION SIMPLE: CHEST
LOCATION SIMPLE: RIGHT LOWER BACK
LOCATION SIMPLE: RIGHT ZYGOMA
LOCATION SIMPLE: LEFT HAND
LOCATION SIMPLE: RIGHT EAR
LOCATION SIMPLE: SCALP
LOCATION SIMPLE: RIGHT CHEEK
LOCATION SIMPLE: SUPERIOR FOREHEAD
LOCATION SIMPLE: LEFT FOREHEAD

## 2019-03-19 ASSESSMENT — PAIN INTENSITY VAS: HOW INTENSE IS YOUR PAIN 0 BEING NO PAIN, 10 BEING THE MOST SEVERE PAIN POSSIBLE?: 1/10 PAIN

## 2019-03-19 ASSESSMENT — SEVERITY ASSESSMENT: SEVERITY: MILD

## 2019-03-19 NOTE — PROCEDURE: LIQUID NITROGEN
Post-Care Instructions: I reviewed with the patient in detail post-care instructions. Patient is to wear sunprotection, and avoid picking at any of the treated lesions. Pt may apply Vaseline to crusted or scabbing areas.
Duration Of Freeze Thaw-Cycle (Seconds): 3
Consent: The patient's consent was obtained including but not limited to risks of crusting, scabbing, blistering, scarring, darker or lighter pigmentary change, recurrence, incomplete removal and infection.
Number Of Freeze-Thaw Cycles: 2 freeze-thaw cycles
Detail Level: Detailed
Render Post-Care Instructions In Note?: no

## 2019-03-19 NOTE — PROCEDURE: TREATMENT REGIMEN
Initiate Treatment: Otc 1% hydrocortisone cream apply to affected area twice daily x 2 weeks as needed for flares
Detail Level: Detailed

## 2019-05-08 ENCOUNTER — APPOINTMENT (RX ONLY)
Dept: URBAN - METROPOLITAN AREA CLINIC 349 | Facility: CLINIC | Age: 84
Setting detail: DERMATOLOGY
End: 2019-05-08

## 2019-05-08 DIAGNOSIS — L90.5 SCAR CONDITIONS AND FIBROSIS OF SKIN: ICD-10-CM

## 2019-05-08 PROBLEM — D48.5 NEOPLASM OF UNCERTAIN BEHAVIOR OF SKIN: Status: ACTIVE | Noted: 2019-05-08

## 2019-05-08 PROBLEM — C44.612 BASAL CELL CARCINOMA OF SKIN OF RIGHT UPPER LIMB, INCLUDING SHOULDER: Status: ACTIVE | Noted: 2019-05-08

## 2019-05-08 PROCEDURE — 88305 TISSUE EXAM BY PATHOLOGIST: CPT

## 2019-05-08 PROCEDURE — ? PATHOLOGY BILLING

## 2019-05-08 PROCEDURE — 11102 TANGNTL BX SKIN SINGLE LES: CPT

## 2019-05-08 PROCEDURE — 99213 OFFICE O/P EST LOW 20 MIN: CPT | Mod: 25

## 2019-05-08 PROCEDURE — ? COUNSELING

## 2019-05-08 PROCEDURE — A4550 SURGICAL TRAYS: HCPCS

## 2019-05-08 PROCEDURE — ? BIOPSY BY SHAVE METHOD

## 2019-05-08 ASSESSMENT — LOCATION ZONE DERM: LOCATION ZONE: FACE

## 2019-05-08 ASSESSMENT — LOCATION DETAILED DESCRIPTION DERM: LOCATION DETAILED: RIGHT SUPERIOR FOREHEAD

## 2019-05-08 ASSESSMENT — LOCATION SIMPLE DESCRIPTION DERM: LOCATION SIMPLE: RIGHT FOREHEAD

## 2019-05-08 NOTE — PROCEDURE: BIOPSY BY SHAVE METHOD
Bill For Surgical Tray: yes
Biopsy Method: Personna blade
Biopsy Type: H and E
Size Of Lesion In Cm: 0
Silver Nitrate Text: The wound bed was treated with silver nitrate after the biopsy was performed.
Render In Bullet Format When Appropriate: No
Cryotherapy Text: The wound bed was treated with cryotherapy after the biopsy was performed.
Anesthesia Volume In Cc (Will Not Render If 0): 1
Dressing: bandage
Accession #: TC ONLY
Hemostasis: Aluminum Chloride
Consent: Written consent was obtained and risks were reviewed including but not limited to scarring, infection, bleeding, scabbing, incomplete removal, nerve damage and allergy to anesthesia.
Electrodesiccation And Curettage Text: The wound bed was treated with electrodesiccation and curettage after the biopsy was performed.
Curettage Text: The wound bed was treated with curettage after the biopsy was performed.
Additional Anesthesia Volume In Cc (Will Not Render If 0): 1.5
Wound Care: Vaseline
Anesthesia Type: 1% lidocaine with 1:100,000 epinephrine and a 1:10 solution of 8.4% sodium bicarbonate
Type Of Destruction Used: Curettage
Detail Level: Detailed
Notification Instructions: Patient will be notified of biopsy results. However, patient instructed to call the office if not contacted within 2 weeks. After the procedure, the patient was oriented to person, place, and time. Patient denied feeling dizzy, queasy, and and declined further observation after initial 5 minute observation time.
Depth Of Biopsy: dermis
Electrodesiccation Text: The wound bed was treated with electrodesiccation after the biopsy was performed.
Billing Type: Third-Party Bill
Post-Care Instructions: I reviewed with the patient in detail post-care instructions. Patient is to keep the biopsy site dry overnight, and then apply Vaseline  daily until healed. Patient may apply hydrogen peroxide soaks to remove any crusting. After the procedure, the patient was oriented to person, place, and time. Patient denied feeling dizzy, queasy, and and declined further observation after initial 5 minute observation time.

## 2019-05-08 NOTE — PROCEDURE: PATHOLOGY BILLING
Immunohistochemistry ([id number] and [id number]) billing is not performed here. Please use the Immunohistochemistry Stain Billing plan to accomplish this. Immunohistochemistry ([id number] and [id number]) billing is not performed here. Please use the Immunohistochemistry Stain Billing plan to accomplish this.

## 2019-07-03 ENCOUNTER — APPOINTMENT (RX ONLY)
Dept: URBAN - METROPOLITAN AREA CLINIC 349 | Facility: CLINIC | Age: 84
Setting detail: DERMATOLOGY
End: 2019-07-03

## 2019-07-03 PROBLEM — C44.612 BASAL CELL CARCINOMA OF SKIN OF RIGHT UPPER LIMB, INCLUDING SHOULDER: Status: ACTIVE | Noted: 2019-07-03

## 2019-07-03 PROBLEM — E78.5 HYPERLIPIDEMIA, UNSPECIFIED: Status: ACTIVE | Noted: 2019-07-03

## 2019-07-03 PROCEDURE — ? COUNSELING

## 2019-07-03 PROCEDURE — ? CURETTAGE AND DESTRUCTION

## 2019-07-03 PROCEDURE — 17263 DSTRJ MAL LES T/A/L 2.1-3.0: CPT

## 2019-07-03 PROCEDURE — A4550 SURGICAL TRAYS: HCPCS

## 2019-07-03 NOTE — PROCEDURE: CURETTAGE AND DESTRUCTION
Hide Accession Number?: No
Size Of Lesion After Curettage: 2.3
Consent was obtained from the patient. The risks, benefits and alternatives to therapy were discussed in detail. Specifically, the risks of infection, scarring, bleeding, prolonged wound healing, nerve injury, incomplete removal, allergy to anesthesia and recurrence were addressed. Alternatives to ED&C, such as: surgical removal and XRT were also discussed.  Prior to the procedure, the treatment site was clearly identified and confirmed by the patient. All components of Universal Protocol/PAUSE Rule completed.
Render Post-Care Instructions In Note?: yes
Bill As A Line Item Or As Units: Line Item
Post-Care Instructions: I reviewed with the patient in detail post-care instructions. Patient is to keep the area dry for 48 hours, and not to engage in any swimming until the area is healed. Should the patient develop any fevers, chills, bleeding, severe pain patient will contact the office immediately. After the procedure, the patient was oriented to person, place, and time. Patient denied feeling dizzy, queasy, and and declined further observation after initial 5 minute observation time.
Number Of Curettages: 3
Total Volume (Ccs): 1
Cautery Type: electrodesiccation
Detail Level: Detailed
Anesthesia Type: 1% lidocaine with epinephrine and a 1:10 solution of 8.4% sodium bicarbonate
Additional Information: (Optional): The wound was cleaned, and a pressure dressing was applied.  The patient received detailed post-op instructions.
What Was Performed First?: Curettage

## 2019-09-17 ENCOUNTER — APPOINTMENT (RX ONLY)
Dept: URBAN - METROPOLITAN AREA CLINIC 349 | Facility: CLINIC | Age: 84
Setting detail: DERMATOLOGY
End: 2019-09-17

## 2019-09-17 DIAGNOSIS — D22 MELANOCYTIC NEVI: ICD-10-CM

## 2019-09-17 DIAGNOSIS — L20.89 OTHER ATOPIC DERMATITIS: ICD-10-CM | Status: WELL CONTROLLED

## 2019-09-17 DIAGNOSIS — Z85.828 PERSONAL HISTORY OF OTHER MALIGNANT NEOPLASM OF SKIN: ICD-10-CM

## 2019-09-17 DIAGNOSIS — L57.0 ACTINIC KERATOSIS: ICD-10-CM

## 2019-09-17 PROBLEM — L20.84 INTRINSIC (ALLERGIC) ECZEMA: Status: ACTIVE | Noted: 2019-09-17

## 2019-09-17 PROBLEM — D22.39 MELANOCYTIC NEVI OF OTHER PARTS OF FACE: Status: ACTIVE | Noted: 2019-09-17

## 2019-09-17 PROBLEM — D22.5 MELANOCYTIC NEVI OF TRUNK: Status: ACTIVE | Noted: 2019-09-17

## 2019-09-17 PROCEDURE — ? TREATMENT REGIMEN

## 2019-09-17 PROCEDURE — 11311 SHAVE SKIN LESION 0.6-1.0 CM: CPT

## 2019-09-17 PROCEDURE — ? LIQUID NITROGEN

## 2019-09-17 PROCEDURE — 17003 DESTRUCT PREMALG LES 2-14: CPT

## 2019-09-17 PROCEDURE — 99214 OFFICE O/P EST MOD 30 MIN: CPT | Mod: 25

## 2019-09-17 PROCEDURE — ? MEDICAL PHOTOGRAPHY REVIEW

## 2019-09-17 PROCEDURE — 17000 DESTRUCT PREMALG LESION: CPT | Mod: 59

## 2019-09-17 PROCEDURE — ? COUNSELING

## 2019-09-17 PROCEDURE — ? SHAVE REMOVAL

## 2019-09-17 PROCEDURE — A4550 SURGICAL TRAYS: HCPCS

## 2019-09-17 PROCEDURE — ? BODY PHOTOGRAPHY

## 2019-09-17 ASSESSMENT — LOCATION DETAILED DESCRIPTION DERM
LOCATION DETAILED: RIGHT SUPERIOR LATERAL LOWER BACK
LOCATION DETAILED: LEFT MID PREAURICULAR CHEEK
LOCATION DETAILED: RIGHT SUPERIOR FRONTAL SCALP
LOCATION DETAILED: LEFT CENTRAL FRONTAL SCALP
LOCATION DETAILED: LEFT MEDIAL INFERIOR CHEST
LOCATION DETAILED: RIGHT INFERIOR FOREHEAD
LOCATION DETAILED: LEFT SUPERIOR LATERAL MALAR CHEEK
LOCATION DETAILED: LEFT FOREHEAD
LOCATION DETAILED: LEFT SUPERIOR FOREHEAD
LOCATION DETAILED: SUPERIOR MID FOREHEAD

## 2019-09-17 ASSESSMENT — LOCATION SIMPLE DESCRIPTION DERM
LOCATION SIMPLE: RIGHT LOWER BACK
LOCATION SIMPLE: CHEST
LOCATION SIMPLE: LEFT FOREHEAD
LOCATION SIMPLE: LEFT SCALP
LOCATION SIMPLE: RIGHT FOREHEAD
LOCATION SIMPLE: SCALP
LOCATION SIMPLE: LEFT CHEEK
LOCATION SIMPLE: SUPERIOR FOREHEAD

## 2019-09-17 ASSESSMENT — LOCATION ZONE DERM
LOCATION ZONE: TRUNK
LOCATION ZONE: SCALP
LOCATION ZONE: FACE

## 2019-09-17 ASSESSMENT — SEVERITY ASSESSMENT: SEVERITY: CLEAR

## 2019-09-17 ASSESSMENT — PAIN INTENSITY VAS: HOW INTENSE IS YOUR PAIN 0 BEING NO PAIN, 10 BEING THE MOST SEVERE PAIN POSSIBLE?: 1/10 PAIN

## 2019-09-17 NOTE — PROCEDURE: BODY PHOTOGRAPHY
Consent: Written consent obtained, risks reviewed for whole body photography. Patient understands that photograph costs may not be covered by insurance, and patient is ultimately responsible for payment.
Whole Body Statement: The whole body was photographed today.
Reason For Photography: The patient is obtaining body photography to observe existing suspicious moles and or monitor for the appearance of any new lesions.
Detail Level: Simple
Was The Entire Body Photographed (Cannot Bill Unless Entire Body Photographed)?: No
Number Of Photographs (Optional- Will Not Render If 0): 2

## 2019-09-17 NOTE — PROCEDURE: LIQUID NITROGEN

## 2019-09-17 NOTE — PROCEDURE: SHAVE REMOVAL
Hemostasis: Aluminum Chloride
Render Path Notes In Note?: No
Anesthesia Volume In Cc: 1.5
Size Of Lesion In Cm (Required): 0.6
Medical Necessity Clause: This procedure was medically necessary because the lesion has a history of change
Consent was obtained from the patient. The risks and benefits to therapy were discussed in detail. Specifically, the risks of infection, scarring, bleeding, prolonged wound healing, incomplete removal, allergy to anesthesia, nerve injury and recurrence were addressed. Prior to the procedure, the treatment site was clearly identified and confirmed by the patient. All components of Universal Protocol/PAUSE Rule completed.
Was A Bandage Applied: Yes
Detail Level: Detailed
X Size Of Lesion In Cm (Optional): 0
Biopsy Method: Personna blade
Post-Care Instructions: I reviewed with the patient in detail post-care instructions. Patient is to keep the biopsy site dry overnight, and then apply Vaseline daily until healed. Patient may apply hydrogen peroxide soaks to remove any crusting. After the procedure, the patient was oriented to person, place, and time. Patient denied feeling dizzy, queasy, and and declined further observation after initial 5 minute observation time.
Billing Type: Third-Party Bill
Medical Necessity Information: It is in your best interest to select a reason for this procedure from the list below. All of these items fulfill various CMS LCD requirements except the new and changing color options.
Notification Instructions: Patient will be notified of biopsy results. However, patient instructed to call the office if not contacted within 2 weeks.
Accession #: [facility]
Wound Care: Vaseline

## 2020-01-01 ENCOUNTER — APPOINTMENT (OUTPATIENT)
Dept: CT IMAGING | Age: 85
End: 2020-01-01
Attending: EMERGENCY MEDICINE
Payer: MEDICARE

## 2020-01-01 ENCOUNTER — APPOINTMENT (OUTPATIENT)
Dept: CT IMAGING | Age: 85
End: 2020-01-01
Attending: STUDENT IN AN ORGANIZED HEALTH CARE EDUCATION/TRAINING PROGRAM
Payer: MEDICARE

## 2020-01-01 ENCOUNTER — HOSPITAL ENCOUNTER (EMERGENCY)
Age: 85
Discharge: HOME | End: 2020-12-14
Attending: STUDENT IN AN ORGANIZED HEALTH CARE EDUCATION/TRAINING PROGRAM
Payer: MEDICARE

## 2020-01-01 ENCOUNTER — HOSPITAL ENCOUNTER (EMERGENCY)
Age: 85
Discharge: HOME | End: 2020-09-16
Attending: EMERGENCY MEDICINE
Payer: MEDICARE

## 2020-01-01 ENCOUNTER — APPOINTMENT (OUTPATIENT)
Dept: GENERAL RADIOLOGY | Age: 85
End: 2020-01-01
Attending: STUDENT IN AN ORGANIZED HEALTH CARE EDUCATION/TRAINING PROGRAM
Payer: MEDICARE

## 2020-01-01 VITALS
WEIGHT: 190 LBS | DIASTOLIC BLOOD PRESSURE: 81 MMHG | OXYGEN SATURATION: 94 % | RESPIRATION RATE: 18 BRPM | HEIGHT: 69 IN | SYSTOLIC BLOOD PRESSURE: 144 MMHG | HEART RATE: 71 BPM | BODY MASS INDEX: 28.14 KG/M2

## 2020-01-01 VITALS
WEIGHT: 210 LBS | HEIGHT: 67 IN | DIASTOLIC BLOOD PRESSURE: 71 MMHG | BODY MASS INDEX: 32.96 KG/M2 | RESPIRATION RATE: 21 BRPM | SYSTOLIC BLOOD PRESSURE: 131 MMHG | HEART RATE: 65 BPM | TEMPERATURE: 98.2 F | OXYGEN SATURATION: 93 %

## 2020-01-01 DIAGNOSIS — F03.90 DEMENTIA WITHOUT BEHAVIORAL DISTURBANCE, UNSPECIFIED DEMENTIA TYPE: ICD-10-CM

## 2020-01-01 DIAGNOSIS — S00.83XA CONTUSION OF FACE, INITIAL ENCOUNTER: Primary | ICD-10-CM
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
## 2020-01-01 DIAGNOSIS — R91.8 PULMONARY NODULES: ICD-10-CM

## 2020-01-01 DIAGNOSIS — S00.81XA ABRASION OF FACE, INITIAL ENCOUNTER: Primary | ICD-10-CM

## 2020-01-01 LAB
ABO + RH BLD: NORMAL
ALBUMIN SERPL-MCNC: 3.2 G/DL (ref 3.2–4.6)
ALBUMIN SERPL-MCNC: 3.2 G/DL (ref 3.2–4.6)
ALBUMIN/GLOB SERPL: 0.9 {RATIO} (ref 1.2–3.5)
ALBUMIN/GLOB SERPL: 1.2 {RATIO} (ref 1.2–3.5)
ALP SERPL-CCNC: 50 U/L (ref 50–136)
ALP SERPL-CCNC: 56 U/L (ref 50–136)
ALT SERPL-CCNC: 18 U/L (ref 12–65)
ALT SERPL-CCNC: 20 U/L (ref 12–65)
ANION GAP SERPL CALC-SCNC: 6 MMOL/L (ref 7–16)
ANION GAP SERPL CALC-SCNC: 6 MMOL/L (ref 7–16)
AST SERPL-CCNC: 20 U/L (ref 15–37)
AST SERPL-CCNC: 24 U/L (ref 15–37)
ATRIAL RATE: 66 BPM
BASOPHILS # BLD: 0.1 K/UL (ref 0–0.2)
BASOPHILS # BLD: 0.1 K/UL (ref 0–0.2)
BASOPHILS NFR BLD: 0 % (ref 0–2)
BASOPHILS NFR BLD: 1 % (ref 0–2)
BILIRUB SERPL-MCNC: 0.7 MG/DL (ref 0.2–1.1)
BILIRUB SERPL-MCNC: 1 MG/DL (ref 0.2–1.1)
BLOOD GROUP ANTIBODIES SERPL: NORMAL
BUN SERPL-MCNC: 16 MG/DL (ref 8–23)
BUN SERPL-MCNC: 27 MG/DL (ref 8–23)
CALCIUM SERPL-MCNC: 8.8 MG/DL (ref 8.3–10.4)
CALCIUM SERPL-MCNC: 9.2 MG/DL (ref 8.3–10.4)
CALCULATED P AXIS, ECG09: 41 DEGREES
CALCULATED R AXIS, ECG10: -21 DEGREES
CALCULATED T AXIS, ECG11: 7 DEGREES
CHLORIDE SERPL-SCNC: 102 MMOL/L (ref 98–107)
CHLORIDE SERPL-SCNC: 96 MMOL/L (ref 98–107)
CK SERPL-CCNC: 222 U/L (ref 21–215)
CO2 SERPL-SCNC: 33 MMOL/L (ref 21–32)
CO2 SERPL-SCNC: 33 MMOL/L (ref 21–32)
CREAT SERPL-MCNC: 0.82 MG/DL (ref 0.8–1.5)
CREAT SERPL-MCNC: 1.2 MG/DL (ref 0.8–1.5)
DIAGNOSIS, 93000: NORMAL
DIFFERENTIAL METHOD BLD: ABNORMAL
DIFFERENTIAL METHOD BLD: ABNORMAL
EOSINOPHIL # BLD: 0.1 K/UL (ref 0–0.8)
EOSINOPHIL # BLD: 0.1 K/UL (ref 0–0.8)
EOSINOPHIL NFR BLD: 1 % (ref 0.5–7.8)
EOSINOPHIL NFR BLD: 1 % (ref 0.5–7.8)
ERYTHROCYTE [DISTWIDTH] IN BLOOD BY AUTOMATED COUNT: 14.2 % (ref 11.9–14.6)
ERYTHROCYTE [DISTWIDTH] IN BLOOD BY AUTOMATED COUNT: 14.2 % (ref 11.9–14.6)
GLOBULIN SER CALC-MCNC: 2.7 G/DL (ref 2.3–3.5)
GLOBULIN SER CALC-MCNC: 3.5 G/DL (ref 2.3–3.5)
GLUCOSE SERPL-MCNC: 105 MG/DL (ref 65–100)
GLUCOSE SERPL-MCNC: 97 MG/DL (ref 65–100)
HCT VFR BLD AUTO: 40.1 % (ref 41.1–50.3)
HCT VFR BLD AUTO: 45.1 % (ref 41.1–50.3)
HGB BLD-MCNC: 12.9 G/DL (ref 13.6–17.2)
HGB BLD-MCNC: 14.2 G/DL (ref 13.6–17.2)
IMM GRANULOCYTES # BLD AUTO: 0.1 K/UL (ref 0–0.5)
IMM GRANULOCYTES # BLD AUTO: 0.1 K/UL (ref 0–0.5)
IMM GRANULOCYTES NFR BLD AUTO: 0 % (ref 0–5)
IMM GRANULOCYTES NFR BLD AUTO: 1 % (ref 0–5)
INR PPP: 1.4
LYMPHOCYTES # BLD: 1.1 K/UL (ref 0.5–4.6)
LYMPHOCYTES # BLD: 1.4 K/UL (ref 0.5–4.6)
LYMPHOCYTES NFR BLD: 11 % (ref 13–44)
LYMPHOCYTES NFR BLD: 9 % (ref 13–44)
MCH RBC QN AUTO: 28.7 PG (ref 26.1–32.9)
MCH RBC QN AUTO: 29.6 PG (ref 26.1–32.9)
MCHC RBC AUTO-ENTMCNC: 31.5 G/DL (ref 31.4–35)
MCHC RBC AUTO-ENTMCNC: 32.2 G/DL (ref 31.4–35)
MCV RBC AUTO: 89.3 FL (ref 79.6–97.8)
MCV RBC AUTO: 94 FL (ref 79.6–97.8)
MONOCYTES # BLD: 1 K/UL (ref 0.1–1.3)
MONOCYTES # BLD: 1.4 K/UL (ref 0.1–1.3)
MONOCYTES NFR BLD: 12 % (ref 4–12)
MONOCYTES NFR BLD: 8 % (ref 4–12)
NEUTS SEG # BLD: 10.2 K/UL (ref 1.7–8.2)
NEUTS SEG # BLD: 9.2 K/UL (ref 1.7–8.2)
NEUTS SEG NFR BLD: 76 % (ref 43–78)
NEUTS SEG NFR BLD: 81 % (ref 43–78)
NRBC # BLD: 0 K/UL (ref 0–0.2)
NRBC # BLD: 0 K/UL (ref 0–0.2)
P-R INTERVAL, ECG05: 176 MS
PLATELET # BLD AUTO: 259 K/UL (ref 150–450)
PLATELET # BLD AUTO: 285 K/UL (ref 150–450)
PMV BLD AUTO: 9.2 FL (ref 9.4–12.3)
PMV BLD AUTO: 9.5 FL (ref 9.4–12.3)
POTASSIUM SERPL-SCNC: 3.8 MMOL/L (ref 3.5–5.1)
POTASSIUM SERPL-SCNC: 4.2 MMOL/L (ref 3.5–5.1)
PROT SERPL-MCNC: 5.9 G/DL (ref 6.3–8.2)
PROT SERPL-MCNC: 6.7 G/DL (ref 6.3–8.2)
PROTHROMBIN TIME: 17.1 SEC (ref 12–14.7)
Q-T INTERVAL, ECG07: 418 MS
QRS DURATION, ECG06: 86 MS
QTC CALCULATION (BEZET), ECG08: 438 MS
RBC # BLD AUTO: 4.49 M/UL (ref 4.23–5.6)
RBC # BLD AUTO: 4.8 M/UL (ref 4.23–5.6)
SODIUM SERPL-SCNC: 135 MMOL/L (ref 136–145)
SODIUM SERPL-SCNC: 141 MMOL/L (ref 136–145)
SPECIMEN EXP DATE BLD: NORMAL
TROPONIN-HIGH SENSITIVITY: 11.5 PG/ML (ref 0–14)
TROPONIN-HIGH SENSITIVITY: 12.1 PG/ML (ref 0–14)
VENTRICULAR RATE, ECG03: 66 BPM
WBC # BLD AUTO: 12.1 K/UL (ref 4.3–11.1)
WBC # BLD AUTO: 12.6 K/UL (ref 4.3–11.1)

## 2020-01-01 PROCEDURE — 80053 COMPREHEN METABOLIC PANEL: CPT

## 2020-01-01 PROCEDURE — 74011250636 HC RX REV CODE- 250/636: Performed by: EMERGENCY MEDICINE

## 2020-01-01 PROCEDURE — 82550 ASSAY OF CK (CPK): CPT

## 2020-01-01 PROCEDURE — 90715 TDAP VACCINE 7 YRS/> IM: CPT | Performed by: STUDENT IN AN ORGANIZED HEALTH CARE EDUCATION/TRAINING PROGRAM

## 2020-01-01 PROCEDURE — 84484 ASSAY OF TROPONIN QUANT: CPT

## 2020-01-01 PROCEDURE — 86900 BLOOD TYPING SEROLOGIC ABO: CPT

## 2020-01-01 PROCEDURE — 70486 CT MAXILLOFACIAL W/O DYE: CPT

## 2020-01-01 PROCEDURE — 90471 IMMUNIZATION ADMIN: CPT

## 2020-01-01 PROCEDURE — 74011250636 HC RX REV CODE- 250/636: Performed by: STUDENT IN AN ORGANIZED HEALTH CARE EDUCATION/TRAINING PROGRAM

## 2020-01-01 PROCEDURE — 85025 COMPLETE CBC W/AUTO DIFF WBC: CPT

## 2020-01-01 PROCEDURE — 70450 CT HEAD/BRAIN W/O DYE: CPT

## 2020-01-01 PROCEDURE — 99284 EMERGENCY DEPT VISIT MOD MDM: CPT

## 2020-01-01 PROCEDURE — 72125 CT NECK SPINE W/O DYE: CPT

## 2020-01-01 PROCEDURE — 85610 PROTHROMBIN TIME: CPT

## 2020-01-01 PROCEDURE — 93005 ELECTROCARDIOGRAM TRACING: CPT | Performed by: EMERGENCY MEDICINE

## 2020-01-01 PROCEDURE — 96360 HYDRATION IV INFUSION INIT: CPT

## 2020-01-01 PROCEDURE — 72072 X-RAY EXAM THORAC SPINE 3VWS: CPT

## 2020-01-01 PROCEDURE — 72100 X-RAY EXAM L-S SPINE 2/3 VWS: CPT

## 2020-01-01 RX ADMIN — TETANUS TOXOID, REDUCED DIPHTHERIA TOXOID AND ACELLULAR PERTUSSIS VACCINE, ADSORBED 0.5 ML: 5; 2.5; 8; 8; 2.5 SUSPENSION INTRAMUSCULAR at 13:28

## 2020-01-01 RX ADMIN — SODIUM CHLORIDE 500 ML: 900 INJECTION, SOLUTION INTRAVENOUS at 16:07

## 2020-09-16 NOTE — ED PROVIDER NOTES
[address] [facility] Emergency Department Arrival Date/Time: No admission date for patient encounter. Laci Rm  MRN: [id number] YOB: 1935   81 y.o. male [facility] EMERGENCY DEPT Room/bed info not found  Seen on 9/16/2020 @ 2:38 PM  
 
Today's Chief Complaint: Patient presents with: 
Fall TRIAGE Provider NOTE: This is an 52-year-old gentleman with a history of blood clots who takes Xarelto. He was walking when he fell forward and struck his face and head on the ground. No definite loss of consciousness. Brought in by EMS. The son is here who states family members called but no mention of loss of consciousness. Patient denies any pain to me at this point. No headache or pain elsewhere in his body. No note of any vomiting. Review of records reveals past history of seizures. Son states this occurred 25 to 30 years ago. Had no evidence for that today. On physical exam he is alert and oriented x1 which may be his baseline. He has a small contusion to forehead and abrasion to his nose. No septal hematoma. No particular deformity or tenderness to the face or head. Neck is nontender. Upper extremities and hips and knees move well without any pain. Low back nontender. Needs head CT due to being on anticoagulant. 2:47 PM 
Pressure about 84 systolic. He is a has a history of hypertension. Patient really without any complaints. His color is good. Blood pressure repeated in the other arm is about the same. Will check screening blood work for dehydration or anemia. Check EKG and troponin. Prioritize bed placement. No obvious history for GI bleed. Lives in assisted living. Possibly overmedicated regarding blood pressure medicine. f Chloe Garcia MD; 9/16/2020 @2:38 PM============================ 
 
Laci Rm is a 81 y.o. male seen on 9/16/2020 at 2:38 PM in the [facility] EMERGENCY DEPT  
HPI:  
[unfilled] Review of Systems: [unfilled] Past Medical History: Primary Care Doctor: Laina Milton MD 
Meds, PMH, PSHx, SocHx at end of this note Allergies: No Known Allergies Key Anti-Platelet Anticoagulant Meds   
    
  
 rivaroxaban (XARELTO) 10 mg tablet Take 10 mg by mouth daily. Physical Exam:  Nursing documentation reviewed. --------------------------- 
             09/16/20      
               1435        
--------------------------- 
 Pulse:         68 Resp:          16 Temp:   98.2 °F (36.8 °C) 
--------------------------- Vital signs were reviewed. [unfilled] MEDICAL DECISION MAKING: 
Differential Diagnosis:  
@MDM@ Data:  
 
Lab findings during this visit: No results found for this or any previous visit (from the past 24 hour(s)). Radiology studies during this visit: CT HEAD WO CONT    (Results Pending) Medications given in the ED: Medications - No data to display Procedure Documentation: [unfilled] Assessment and Plan:  
 
Other ED Course Notes: 
  
 
Past Medical History:  
Past Medical History: 
3/18/2016: Abnormal cardiovascular function study No date: Arthritis 3/18/2016: Coronary atherosclerosis of native coronary vessel 3/18/2016: Coronary atherosclerosis of native coronary vessel No date: Dyspnea on exertion 3/18/2016: HLD (hyperlipidemia) No date: Hypertension No date: Seizures ([facility] [address] Comment:  GRAN MAL SEIZURE 19 YEARS AGO No date: Upper respiratory infection Past Surgical History: 
No date: HX ORTHOPAEDIC Comment:  LEFT HIP No date: HX OTHER SURGICAL Comment:  AV MALFORMATION REPAIR Social History Tobacco Use Smoking status: Former Smoker Smokeless tobacco: Never Used Tobacco comment: 2042 Alcohol use: Yes Alcohol/week: 11.7 standard drinks Types: 14 Glasses of wine per week Drug use: No 
 
Home Medication: This SmartLink can only be used in locations that support formatted text. Fall  
 
  
 
Past Medical History:  
Diagnosis Date  Abnormal cardiovascular function study 3/18/2016  Arthritis  Coronary atherosclerosis of native coronary vessel 3/18/2016  Coronary atherosclerosis of native coronary vessel 3/18/2016  Dyspnea on exertion  HLD (hyperlipidemia) 3/18/2016  Hypertension  Seizures ([facility] [address] GRAN MAL SEIZURE 19 YEARS AGO  Upper respiratory infection Past Surgical History:  
Procedure Laterality Date  HX ORTHOPAEDIC    
 LEFT HIP  
 HX OTHER SURGICAL    
 AV MALFORMATION REPAIR No family history on file. Social History Socioeconomic History  Marital status:  Spouse name: Not on file  Number of children: Not on file  Years of education: Not on file  Highest education level: Not on file Occupational History  Not on file Social Needs  Financial resource strain: Not on file  Food insecurity Worry: Not on file Inability: Not on file  Transportation needs Medical: Not on file Non-medical: Not on file Tobacco Use  Smoking status: Former Smoker  Smokeless tobacco: Never Used  Tobacco comment: 8025 Substance and Sexual Activity  Alcohol use: Yes Alcohol/week: 11.7 standard drinks Types: 14 Glasses of wine per week  Drug use: No  
 Sexual activity: Never Lifestyle  Physical activity Days per week: Not on file Minutes per session: Not on file  Stress: Not on file Relationships  Social connections Talks on phone: Not on file Gets together: Not on file Attends Yazidi service: Not on file Active member of club or organization: Not on file Attends meetings of clubs or organizations: Not on file Relationship status: Not on file  Intimate partner violence Fear of current or ex partner: Not on file Emotionally abused: Not on file Physically abused: Not on file Forced sexual activity: Not on file Other Topics Concern  Not on file Social History Narrative  Not on file ALLERGIES: Patient has no known allergies. Review of Systems Unable to perform ROS: Dementia Vitals:  
 09/16/20 1435 Pulse: 68 Resp: 16 Temp: 98.2 °F (36.8 °C) Weight: 95.3 kg (210 lb) Height: 5' 7\" (1.702 m) Physical Exam 
Vitals signs and nursing note reviewed. Constitutional:   
   Appearance: Normal appearance. He is well-developed and normal weight. HENT:  
   Head: Normocephalic. Comments: Superficial abrasion to right forehead and anterior nose as indicated. No septal hematoma Eyes:  
   Conjunctiva/sclera: Conjunctivae normal.  
   Pupils: Pupils are equal, round, and reactive to light. Neck: Musculoskeletal: Normal range of motion and neck supple. Pulmonary:  
   Effort: Pulmonary effort is normal. No respiratory distress. Musculoskeletal: Normal range of motion. Skin: 
   General: Skin is warm and dry. Neurological:  
   Mental Status: He is oriented to person, place, and time. MDM Number of Diagnoses or Management Options Abrasion of face, initial encounter: new and does not require workup Dementia without behavioral disturbance, unspecified dementia type [facility]):  
Fall, initial encounter: new and does not require workup Diagnosis management comments: 3:56 PM history obtained from the patient's son, states his mental status is currently at its baseline. 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 He has been hypotensive while here, will give him IV fluids and reevaluate. 4:30 PM blood pressure improved prior to IV fluids, is now 418 systolic. Discussed results with family members, unremarkable head CT findings. Amount and/or Complexity of Data Reviewed Clinical lab tests: ordered and reviewed Tests in the radiology section of CPT®: ordered and reviewed Obtain history from someone other than the patient: yes Risk of Complications, Morbidity, and/or Mortality Presenting problems: moderate Diagnostic procedures: moderate Management options: moderate Patient Progress Patient progress: stable Procedures

## 2020-09-16 NOTE — ED TRIAGE NOTES
Pt arrived via EMS from the [address]. Pt escaped for SNF. Pt fell and abrasion noted to forehead and nose. Pt is on blood thinners. Pt is at baseline mentation. /53 HR 63 SpO 91%

## 2020-09-16 NOTE — ED NOTES
I have reviewed discharge instructions with the caregiver. The caregiver verbalized understanding. Patient left ED via Discharge Method: ambulatory to Home with children. Opportunity for questions and clarification provided. Patient given 0 scripts. To continue your aftercare when you leave the hospital, you may receive an automated call from our care team to check in on how you are doing. This is a free service and part of our promise to provide the best care and service to meet your aftercare needs.  If you have questions, or wish to unsubscribe from this service please call [phone]. Thank you for Choosing our [facility] Emergency Department.

## 2020-12-14 NOTE — ED TRIAGE NOTES
Pt to ER with EMS after fall this morning. Daughter states staff told her that he appeared to have been on the floor for a bit. Pt has a bruise to left cheek and skin tear on left wrist. Pt has dementia and does not appear more altered than normal. Pt has a mask on while being triaged.

## 2020-12-14 NOTE — DISCHARGE INSTRUCTIONS
Patient Education        Head Injury: Care Instructions  Your Care Instructions     Most injuries to the head are minor. Bumps, cuts, and scrapes on the head and face usually heal well and can be treated the same as injuries to other parts of the body. Although it's rare, once in a while a more serious problem shows up after you are home. So it's good to be on the lookout for symptoms for a day or two. Follow-up care is a key part of your treatment and safety. Be sure to make and go to all appointments, and call your doctor if you are having problems. It's also a good idea to know your test results and keep a list of the medicines you take. How can you care for yourself at home? · Follow your doctor's instructions. He or she will tell you if you need someone to watch you closely for the next 24 hours or longer. · Take it easy for the next few days or more if you are not feeling well. · Ask your doctor when it's okay for you to go back to activities like driving a car, riding a bike, or operating machinery. When should you call for help? Call 911 anytime you think you may need emergency care. For example, call if:    · You have a seizure.     · You passed out (lost consciousness).     · You are confused or can't stay awake. Call your doctor now or seek immediate medical care if:    · You have new or worse vomiting.     · You feel less alert.     · You have new weakness or numbness in any part of your body. Watch closely for changes in your health, and be sure to contact your doctor if:    · You do not get better as expected.     · You have new symptoms, such as headaches, trouble concentrating, or changes in mood. Where can you learn more? Go to http://www.gray.com/  Enter M264 in the search box to learn more about \"Head Injury: Care Instructions. \"  Current as of: November 20, 2019               Content Version: 12.6  © 7893-7743 [employer].    Care instructions adapted under license by [employer] (which disclaims liability or warranty for this information). If you have questions about a medical condition or this instruction, always ask your healthcare professional. [address] any warranty or liability for your use of this information. Patient Education        Learning About Lung Nodules  What is a lung nodule? A lung nodule is a growth in the lung. A single nodule surrounded by lung tissue is called a solitary pulmonary nodule. A lung nodule might not cause any symptoms. Your doctor may have found one or more nodules on your lung when you were having a chest X-ray or CT scan. Or it may have been found during a lung cancer screening. A lung nodule may be caused by an old infection or cancer. It might also be a noncancerous growth. Lung nodules can cause a screening to give an abnormal result. Most nodules do not cause any harm. But without further tests, your doctor can't tell whether an abnormal finding is cancer, a harmless nodule, or something else. What can you expect when you have a lung nodule? Your doctor will look at several risk factors to see how likely it is that the nodule is cancer. He or she will look at:  · Whether you smoke or have ever smoked. · Your age and your family's medical history. · Whether you have ever had lung cancer. · The size, density, and other characteristics of the nodule. · Whether the nodule has changed in size. Your doctor may look at past chest X-rays or CT scans, if available, and compare them. Or you may have a series of CT scans to see if the nodule grows over time. What happens next depends on the risk of the nodule being cancer. · If you have no risk factors and the nodule is small, your doctor may advise doing nothing. · If the risk is small, your doctor may schedule follow-up appointments and CT scans later to see if the nodule is growing.   · If there's a higher risk of cancer, your doctor may:  ? Do a PET scan, which may help tell if the nodule is cancerous or not. ? Take a sample of tissue from the nodule for testing. This is called a biopsy. ? Remove the nodule with surgery. Follow-up care is a key part of your treatment and safety. Be sure to make and go to all appointments, and call your doctor if you are having problems. It's also a good idea to know your test results and keep a list of the medicines you take. Where can you learn more? Go to http://www.gray.com/  Enter Q388 in the search box to learn more about \"Learning About Lung Nodules. \"  Current as of: April 29, 2020               Content Version: 12.6  © 2600-2229 [employer]. Care instructions adapted under license by [employer] (which disclaims liability or warranty for this information). If you have questions about a medical condition or this instruction, always ask your healthcare professional. [place] [address] any warranty or liability for your use of this information. ith your primary care provider for further evaluation and recheck as discussed. Take care when standing from a seated position to prevent falls. Return at any point for further evaluation and recheck.

## 2020-12-14 NOTE — ED PROVIDER NOTES
80-year-old male patient presents with reports of unwitnessed fall resident of the [place] and suffers from dementia at baseline. Staff reportedly came to check on he and his wife this morning for breakfast and found patient lying on the floor. State that it appeared patient had been there for some time as he was lying on a bath mat under his head and had attempted to cover himself. Patient has little recollection of the fall what caused it. His mentation is normal for him per daughter at bedside. He did report some back pain with movement at the facility. Patient denies any specific symptoms at this time. History of falls in the past.  Patient does use blood thinners for previous DVT. Past Medical History:  
Diagnosis Date  Abnormal cardiovascular function study 3/18/2016  Arthritis  Coronary atherosclerosis of native coronary vessel 3/18/2016  Coronary atherosclerosis of native coronary vessel 3/18/2016  Dementia ([facility] [address]  Dyspnea on exertion  HLD (hyperlipidemia) 3/18/2016  Hypertension  Seizures ([facility] [address] GRAN MAL SEIZURE 19 YEARS AGO  Upper respiratory infection Past Surgical History:  
Procedure Laterality Date  HX ORTHOPAEDIC    
 LEFT HIP  
 HX OTHER SURGICAL    
 AV MALFORMATION REPAIR History reviewed. No pertinent family history. Social History Socioeconomic History  Marital status:  Spouse name: Not on file  Number of children: Not on file  Years of education: Not on file  Highest education level: Not on file Occupational History  Not on file Social Needs  Financial resource strain: Not on file  Food insecurity Worry: Not on file Inability: Not on file  Transportation needs Medical: Not on file Non-medical: Not on file Tobacco Use  Smoking status: Former Smoker  Smokeless tobacco: Never Used  Tobacco comment: 3992 Substance and Sexual Activity  Alcohol use: Yes Alcohol/week: 11.7 standard drinks Types: 14 Glasses of wine per week  Drug use: No  
 Sexual activity: Never Lifestyle  Physical activity Days per week: Not on file Minutes per session: Not on file  Stress: Not on file Relationships  Social connections Talks on phone: Not on file Gets together: Not on file Attends Jain service: Not on file Active member of club or organization: Not on file Attends meetings of clubs or organizations: Not on file Relationship status: Not on file  Intimate partner violence Fear of current or ex partner: Not on file Emotionally abused: Not on file Physically abused: Not on file Forced sexual activity: Not on file Other Topics Concern  Not on file Social History Narrative  Not on file ALLERGIES: Patient has no known allergies. Review of Systems Unable to perform ROS: Dementia Musculoskeletal: Positive for back pain. All other systems reviewed and are negative. Vitals:  
 12/14/20 1007 BP: (!) 151/74 Pulse: 74 Resp: 20 SpO2: 91% Weight: 86.2 kg (190 lb) Height: 5' 9\" (1.753 m) Physical Exam 
Vitals signs and nursing note reviewed. Constitutional:   
   General: He is not in acute distress. Appearance: He is well-developed. He is not diaphoretic. Comments: Pleasantly confused appearing 61-year-old male, overall appears well, alert and oriented to person place and time. No acute distress, speaks in clear, fluid sentences. HENT:  
   Head: Normocephalic. Comments: Bruising noted to the left cheek. There is no hemotympanum, hagan sign or raccoon eyes otherwise. No other focal findings of trauma. Right Ear: External ear normal.  
   Left Ear: External ear normal.  
   Nose: Nose normal.  
Eyes:  
   Pupils: Pupils are equal, round, and reactive to light. Neck: Musculoskeletal: Normal range of motion. Cardiovascular:  
   Rate and Rhythm: Normal rate and regular rhythm. Heart sounds: Normal heart sounds. No murmur. No friction rub. No gallop. Pulmonary:  
   Effort: Pulmonary effort is normal. No respiratory distress. Breath sounds: Normal breath sounds. No stridor. No decreased breath sounds, wheezing, rhonchi or rales. Chest:  
   Chest wall: No tenderness. Abdominal:  
   General: There is no distension. Palpations: Abdomen is soft. There is no mass. Tenderness: There is no abdominal tenderness. There is no guarding or rebound. Hernia: No hernia is present. Musculoskeletal: Normal range of motion. General: No tenderness or deformity. Comments: Endorses some midline thoracic pain at the middle thoracic spine with palpation though no palpable step-off or deformities noted. No significant pain on movement on my exam. 
 
Pelvis is unremarkable and asymptomatic with both lateral and AP compression. Range of motion testing of the lower extremities is unremarkable and painless. Pulses are all intact. Skin: 
   General: Skin is warm and dry. Comments: Facial skin tears noted over the left wrist and hand. Neurological:  
   Mental Status: He is alert and oriented to person, place, and time. Cranial Nerves: No cranial nerve deficit. MDM Number of Diagnoses or Management Options Amount and/or Complexity of Data Reviewed Clinical lab tests: ordered and reviewed Tests in the radiology section of CPT®: ordered and reviewed Tests in the medicine section of CPT®: ordered and reviewed Obtain history from someone other than the patient: yes Independent visualization of images, tracings, or specimens: yes Risk of Complications, Morbidity, and/or Mortality Presenting problems: moderate Diagnostic procedures: low Management options: moderate Patient Progress Patient progress: stable Procedures

## 2021-01-01 ENCOUNTER — HOSPITAL ENCOUNTER (EMERGENCY)
Age: 86
Discharge: HOME | End: 2021-01-17
Attending: EMERGENCY MEDICINE
Payer: MEDICARE

## 2021-01-01 ENCOUNTER — HOSPICE ADMISSION (OUTPATIENT)
Dept: HOSPICE | Facility: HOSPICE | Age: 86
End: 2021-01-01
Payer: MEDICARE

## 2021-01-01 ENCOUNTER — HOSPITAL ENCOUNTER (INPATIENT)
Age: 86
LOS: 21 days | End: 2021-03-25
Attending: INTERNAL MEDICINE | Admitting: INTERNAL MEDICINE
Payer: MEDICARE

## 2021-01-01 VITALS
HEART RATE: 47 BPM | RESPIRATION RATE: 15 BRPM | HEIGHT: 69 IN | SYSTOLIC BLOOD PRESSURE: 136 MMHG | BODY MASS INDEX: 28.14 KG/M2 | WEIGHT: 190 LBS | DIASTOLIC BLOOD PRESSURE: 60 MMHG | TEMPERATURE: 97.7 F | OXYGEN SATURATION: 91 %

## 2021-01-01 VITALS
HEART RATE: 133 BPM | RESPIRATION RATE: 10 BRPM | SYSTOLIC BLOOD PRESSURE: 65 MMHG | DIASTOLIC BLOOD PRESSURE: 42 MMHG | TEMPERATURE: 101.2 F

## 2021-01-01 DIAGNOSIS — G30.1 LATE ONSET ALZHEIMER'S DISEASE WITHOUT BEHAVIORAL DISTURBANCE (HCC): ICD-10-CM

## 2021-01-01 DIAGNOSIS — R07.89 OTHER CHEST PAIN: ICD-10-CM

## 2021-01-01 DIAGNOSIS — G89.3 CANCER ASSOCIATED PAIN: ICD-10-CM

## 2021-01-01 DIAGNOSIS — R00.1 BRADYCARDIA: ICD-10-CM

## 2021-01-01 DIAGNOSIS — F02.80 LATE ONSET ALZHEIMER'S DISEASE WITHOUT BEHAVIORAL DISTURBANCE (HCC): ICD-10-CM

## 2021-01-01 DIAGNOSIS — J96.01 ACUTE RESPIRATORY FAILURE WITH HYPOXIA (HCC): ICD-10-CM

## 2021-01-01 DIAGNOSIS — C78.00 MALIGNANT NEOPLASM METASTATIC TO LUNG, UNSPECIFIED LATERALITY (HCC): ICD-10-CM

## 2021-01-01 DIAGNOSIS — R41.0 CONFUSION: ICD-10-CM

## 2021-01-01 DIAGNOSIS — M79.652 PAIN OF LEFT THIGH: Primary | ICD-10-CM

## 2021-01-01 DIAGNOSIS — I82.512 CHRONIC DEEP VEIN THROMBOSIS (DVT) OF LEFT FEMORAL VEIN (HCC): ICD-10-CM

## 2021-01-01 DIAGNOSIS — R55 SYNCOPE AND COLLAPSE: Primary | ICD-10-CM

## 2021-01-01 LAB
ALBUMIN SERPL-MCNC: 2.8 G/DL (ref 3.2–4.6)
ALBUMIN/GLOB SERPL: 1 {RATIO} (ref 1.2–3.5)
ALP SERPL-CCNC: 54 U/L (ref 50–136)
ALT SERPL-CCNC: 16 U/L (ref 12–65)
ANION GAP SERPL CALC-SCNC: 8 MMOL/L (ref 7–16)
AST SERPL-CCNC: 19 U/L (ref 15–37)
ATRIAL RATE: 85 BPM
BASOPHILS # BLD: 0.1 K/UL (ref 0–0.2)
BASOPHILS NFR BLD: 1 % (ref 0–2)
BILIRUB SERPL-MCNC: 0.6 MG/DL (ref 0.2–1.1)
BUN SERPL-MCNC: 17 MG/DL (ref 8–23)
CALCIUM SERPL-MCNC: 8.5 MG/DL (ref 8.3–10.4)
CALCULATED R AXIS, ECG10: -6 DEGREES
CALCULATED T AXIS, ECG11: 18 DEGREES
CHLORIDE SERPL-SCNC: 105 MMOL/L (ref 98–107)
CO2 SERPL-SCNC: 29 MMOL/L (ref 21–32)
CREAT SERPL-MCNC: 1.02 MG/DL (ref 0.8–1.5)
DIAGNOSIS, 93000: NORMAL
DIFFERENTIAL METHOD BLD: ABNORMAL
EOSINOPHIL # BLD: 0.2 K/UL (ref 0–0.8)
EOSINOPHIL NFR BLD: 2 % (ref 0.5–7.8)
ERYTHROCYTE [DISTWIDTH] IN BLOOD BY AUTOMATED COUNT: 14.2 % (ref 11.9–14.6)
GLOBULIN SER CALC-MCNC: 2.8 G/DL (ref 2.3–3.5)
GLUCOSE SERPL-MCNC: 104 MG/DL (ref 65–100)
HCT VFR BLD AUTO: 40.8 % (ref 41.1–50.3)
HGB BLD-MCNC: 12.7 G/DL (ref 13.6–17.2)
IMM GRANULOCYTES # BLD AUTO: 0.1 K/UL (ref 0–0.5)
IMM GRANULOCYTES NFR BLD AUTO: 1 % (ref 0–5)
LYMPHOCYTES # BLD: 1.6 K/UL (ref 0.5–4.6)
LYMPHOCYTES NFR BLD: 15 % (ref 13–44)
MCH RBC QN AUTO: 29 PG (ref 26.1–32.9)
MCHC RBC AUTO-ENTMCNC: 31.1 G/DL (ref 31.4–35)
MCV RBC AUTO: 93.2 FL (ref 79.6–97.8)
MONOCYTES # BLD: 1 K/UL (ref 0.1–1.3)
MONOCYTES NFR BLD: 9 % (ref 4–12)
NEUTS SEG # BLD: 7.6 K/UL (ref 1.7–8.2)
NEUTS SEG NFR BLD: 72 % (ref 43–78)
NRBC # BLD: 0 K/UL (ref 0–0.2)
PLATELET # BLD AUTO: 247 K/UL (ref 150–450)
PMV BLD AUTO: 9.7 FL (ref 9.4–12.3)
POTASSIUM SERPL-SCNC: 4.1 MMOL/L (ref 3.5–5.1)
PROT SERPL-MCNC: 5.6 G/DL (ref 6.3–8.2)
Q-T INTERVAL, ECG07: 470 MS
QRS DURATION, ECG06: 88 MS
QTC CALCULATION (BEZET), ECG08: 424 MS
RBC # BLD AUTO: 4.38 M/UL (ref 4.23–5.6)
SODIUM SERPL-SCNC: 142 MMOL/L (ref 136–145)
VENTRICULAR RATE, ECG03: 49 BPM
WBC # BLD AUTO: 10.5 K/UL (ref 4.3–11.1)

## 2021-01-01 PROCEDURE — G0299 HHS/HOSPICE OF RN EA 15 MIN: HCPCS

## 2021-01-01 PROCEDURE — 3336590001 HSPC ROOM AND BOARD

## 2021-01-01 PROCEDURE — 0651 HSPC ROUTINE HOME CARE

## 2021-01-01 PROCEDURE — 74011250637 HC RX REV CODE- 250/637: Performed by: NURSE PRACTITIONER

## 2021-01-01 PROCEDURE — 0656 HSPC GENERAL INPATIENT

## 2021-01-01 PROCEDURE — 85025 COMPLETE CBC W/AUTO DIFF WBC: CPT

## 2021-01-01 PROCEDURE — 74011250637 HC RX REV CODE- 250/637: Performed by: INTERNAL MEDICINE

## 2021-01-01 PROCEDURE — 80053 COMPREHEN METABOLIC PANEL: CPT

## 2021-01-01 PROCEDURE — 99284 EMERGENCY DEPT VISIT MOD MDM: CPT

## 2021-01-01 PROCEDURE — 74011000250 HC RX REV CODE- 250: Performed by: NURSE PRACTITIONER

## 2021-01-01 PROCEDURE — 93005 ELECTROCARDIOGRAM TRACING: CPT

## 2021-01-01 PROCEDURE — 3336500001 HSPC ELECTION

## 2021-01-01 PROCEDURE — 74011250636 HC RX REV CODE- 250/636: Performed by: NURSE PRACTITIONER

## 2021-01-01 RX ORDER — ACETAMINOPHEN 325 MG/1
650 TABLET ORAL
Status: DISCONTINUED | OUTPATIENT
Start: 2021-01-01 | End: 2021-01-01

## 2021-01-01 RX ORDER — HALOPERIDOL 5 MG/ML
2 INJECTION INTRAMUSCULAR
Status: DISCONTINUED | OUTPATIENT
Start: 2021-01-01 | End: 2021-01-01 | Stop reason: HOSPADM

## 2021-01-01 RX ORDER — ATENOLOL 25 MG/1
25 TABLET ORAL DAILY
COMMUNITY
Start: 2021-01-01 | End: 2021-04-03

## 2021-01-01 RX ORDER — ACETAMINOPHEN 325 MG/1
650 TABLET ORAL
Status: DISCONTINUED | OUTPATIENT
Start: 2021-01-01 | End: 2021-01-01 | Stop reason: SDUPTHER

## 2021-01-01 RX ORDER — NITROGLYCERIN 80 MG/1
1 PATCH TRANSDERMAL DAILY
Status: DISCONTINUED | OUTPATIENT
Start: 2021-01-01 | End: 2021-01-01

## 2021-01-01 RX ORDER — SENNOSIDES 8.6 MG/1
1 TABLET ORAL 2 TIMES DAILY
Status: DISCONTINUED | OUTPATIENT
Start: 2021-01-01 | End: 2021-01-01

## 2021-01-01 RX ORDER — HALOPERIDOL 1 MG/1
2 TABLET ORAL
Status: DISCONTINUED | OUTPATIENT
Start: 2021-01-01 | End: 2021-01-01 | Stop reason: SDUPTHER

## 2021-01-01 RX ORDER — ALBUTEROL SULFATE 0.83 MG/ML
2.5 SOLUTION RESPIRATORY (INHALATION)
Status: DISCONTINUED | OUTPATIENT
Start: 2021-01-01 | End: 2021-01-01 | Stop reason: HOSPADM

## 2021-01-01 RX ORDER — ATENOLOL 25 MG/1
25 TABLET ORAL DAILY
Status: DISCONTINUED | OUTPATIENT
Start: 2021-01-01 | End: 2021-01-01

## 2021-01-01 RX ORDER — GLYCOPYRROLATE 0.2 MG/ML
0.2 INJECTION INTRAMUSCULAR; INTRAVENOUS
Status: DISCONTINUED | OUTPATIENT
Start: 2021-01-01 | End: 2021-01-01 | Stop reason: HOSPADM

## 2021-01-01 RX ORDER — MORPHINE SULFATE 2 MG/ML
2 INJECTION, SOLUTION INTRAMUSCULAR; INTRAVENOUS
Status: DISCONTINUED | OUTPATIENT
Start: 2021-01-01 | End: 2021-01-01 | Stop reason: HOSPADM

## 2021-01-01 RX ORDER — LORAZEPAM 2 MG/ML
2 INJECTION INTRAMUSCULAR
Status: DISCONTINUED | OUTPATIENT
Start: 2021-01-01 | End: 2021-01-01 | Stop reason: HOSPADM

## 2021-01-01 RX ORDER — ESCITALOPRAM OXALATE 5 MG/1
5 TABLET ORAL DAILY
COMMUNITY

## 2021-01-01 RX ORDER — SENNOSIDES 8.6 MG/1
1 TABLET ORAL
Status: DISCONTINUED | OUTPATIENT
Start: 2021-01-01 | End: 2021-01-01

## 2021-01-01 RX ORDER — MEMANTINE HYDROCHLORIDE 28 MG/1
28 CAPSULE, EXTENDED RELEASE ORAL DAILY
COMMUNITY

## 2021-01-01 RX ORDER — NITROGLYCERIN 0.4 MG/1
0.4 TABLET SUBLINGUAL
Status: DISCONTINUED | OUTPATIENT
Start: 2021-01-01 | End: 2021-01-01 | Stop reason: HOSPADM

## 2021-01-01 RX ORDER — DIPHENHYDRAMINE HCL 25 MG
25 CAPSULE ORAL
Status: DISCONTINUED | OUTPATIENT
Start: 2021-01-01 | End: 2021-01-01

## 2021-01-01 RX ORDER — HYOSCYAMINE SULFATE 0.12 MG/1
0.12 TABLET SUBLINGUAL
Status: DISCONTINUED | OUTPATIENT
Start: 2021-01-01 | End: 2021-01-01

## 2021-01-01 RX ORDER — ACETAMINOPHEN 650 MG/1
650 SUPPOSITORY RECTAL
Status: DISCONTINUED | OUTPATIENT
Start: 2021-01-01 | End: 2021-01-01 | Stop reason: HOSPADM

## 2021-01-01 RX ORDER — LORAZEPAM 1 MG/1
0.5 TABLET ORAL
Status: DISCONTINUED | OUTPATIENT
Start: 2021-01-01 | End: 2021-01-01

## 2021-01-01 RX ORDER — MORPHINE SULFATE 100 MG/5ML
10 SOLUTION ORAL EVERY 8 HOURS
Status: DISCONTINUED | OUTPATIENT
Start: 2021-01-01 | End: 2021-01-01

## 2021-01-01 RX ORDER — MORPHINE SULFATE 2 MG/ML
2 INJECTION, SOLUTION INTRAMUSCULAR; INTRAVENOUS EVERY 6 HOURS
Status: DISCONTINUED | OUTPATIENT
Start: 2021-01-01 | End: 2021-01-01 | Stop reason: HOSPADM

## 2021-01-01 RX ORDER — MORPHINE SULFATE 100 MG/5ML
5 SOLUTION ORAL
Status: DISCONTINUED | OUTPATIENT
Start: 2021-01-01 | End: 2021-01-01

## 2021-01-01 RX ORDER — MAG HYDROX/ALUMINUM HYD/SIMETH 200-200-20
30 SUSPENSION, ORAL (FINAL DOSE FORM) ORAL
Status: DISCONTINUED | OUTPATIENT
Start: 2021-01-01 | End: 2021-01-01

## 2021-01-01 RX ORDER — FACIAL-BODY WIPES
10 EACH TOPICAL AS NEEDED
Status: DISCONTINUED | OUTPATIENT
Start: 2021-01-01 | End: 2021-01-01

## 2021-01-01 RX ORDER — BENZONATATE 100 MG/1
200 CAPSULE ORAL
Status: DISCONTINUED | OUTPATIENT
Start: 2021-01-01 | End: 2021-01-01

## 2021-01-01 RX ORDER — CHOLECALCIFEROL (VITAMIN D3) 125 MCG
5 CAPSULE ORAL
Status: ON HOLD | COMMUNITY
Start: 2021-01-01 | End: 2021-01-01

## 2021-01-01 RX ORDER — LISINOPRIL AND HYDROCHLOROTHIAZIDE 12.5; 2 MG/1; MG/1
2 TABLET ORAL DAILY
COMMUNITY

## 2021-01-01 RX ORDER — HALOPERIDOL 1 MG/1
2 TABLET ORAL
Status: DISCONTINUED | OUTPATIENT
Start: 2021-01-01 | End: 2021-01-01

## 2021-01-01 RX ORDER — FACIAL-BODY WIPES
10 EACH TOPICAL AS NEEDED
Status: DISCONTINUED | OUTPATIENT
Start: 2021-01-01 | End: 2021-01-01 | Stop reason: HOSPADM

## 2021-01-01 RX ORDER — TAMSULOSIN HYDROCHLORIDE 0.4 MG/1
0.4 CAPSULE ORAL DAILY
Status: DISCONTINUED | OUTPATIENT
Start: 2021-01-01 | End: 2021-01-01

## 2021-01-01 RX ORDER — MORPHINE SULFATE 100 MG/5ML
5 SOLUTION ORAL EVERY 6 HOURS
Status: DISCONTINUED | OUTPATIENT
Start: 2021-01-01 | End: 2021-01-01

## 2021-01-01 RX ADMIN — SENNOSIDES 8.6 MG: 8.6 TABLET, FILM COATED ORAL at 17:21

## 2021-01-01 RX ADMIN — MORPHINE SULFATE 5 MG: 100 SOLUTION ORAL at 17:22

## 2021-01-01 RX ADMIN — HALOPERIDOL 2 MG: 1 TABLET ORAL at 18:08

## 2021-01-01 RX ADMIN — MORPHINE SULFATE 5 MG: 10 SOLUTION ORAL at 00:26

## 2021-01-01 RX ADMIN — MORPHINE SULFATE 5 MG: 100 SOLUTION ORAL at 13:07

## 2021-01-01 RX ADMIN — MORPHINE SULFATE 5 MG: 100 SOLUTION ORAL at 23:53

## 2021-01-01 RX ADMIN — MORPHINE SULFATE 2 MG: 2 INJECTION, SOLUTION INTRAMUSCULAR; INTRAVENOUS at 23:19

## 2021-01-01 RX ADMIN — TAMSULOSIN HYDROCHLORIDE 0.4 MG: 0.4 CAPSULE ORAL at 08:58

## 2021-01-01 RX ADMIN — TAMSULOSIN HYDROCHLORIDE 0.4 MG: 0.4 CAPSULE ORAL at 08:07

## 2021-01-01 RX ADMIN — MORPHINE SULFATE 5 MG: 100 SOLUTION ORAL at 17:34

## 2021-01-01 RX ADMIN — MORPHINE SULFATE 5 MG: 100 SOLUTION ORAL at 05:50

## 2021-01-01 RX ADMIN — TAMSULOSIN HYDROCHLORIDE 0.4 MG: 0.4 CAPSULE ORAL at 10:06

## 2021-01-01 RX ADMIN — SENNOSIDES 8.6 MG: 8.6 TABLET, FILM COATED ORAL at 08:42

## 2021-01-01 RX ADMIN — LORAZEPAM 0.5 MG: 1 TABLET ORAL at 18:59

## 2021-01-01 RX ADMIN — SENNOSIDES 8.6 MG: 8.6 TABLET, FILM COATED ORAL at 09:18

## 2021-01-01 RX ADMIN — DIPHENHYDRAMINE HYDROCHLORIDE 25 MG: 25 CAPSULE ORAL at 22:44

## 2021-01-01 RX ADMIN — SENNOSIDES 8.6 MG: 8.6 TABLET, FILM COATED ORAL at 08:38

## 2021-01-01 RX ADMIN — HALOPERIDOL 2 MG: 1 TABLET ORAL at 02:18

## 2021-01-01 RX ADMIN — MORPHINE SULFATE 5 MG: 100 SOLUTION ORAL at 23:40

## 2021-01-01 RX ADMIN — MORPHINE SULFATE 5 MG: 100 SOLUTION ORAL at 05:34

## 2021-01-01 RX ADMIN — SENNOSIDES 8.6 MG: 8.6 TABLET, FILM COATED ORAL at 17:12

## 2021-01-01 RX ADMIN — TAMSULOSIN HYDROCHLORIDE 0.4 MG: 0.4 CAPSULE ORAL at 08:26

## 2021-01-01 RX ADMIN — MORPHINE SULFATE 5 MG: 100 SOLUTION ORAL at 23:03

## 2021-01-01 RX ADMIN — MORPHINE SULFATE 5 MG: 100 SOLUTION ORAL at 11:37

## 2021-01-01 RX ADMIN — SENNOSIDES 8.6 MG: 8.6 TABLET, FILM COATED ORAL at 10:06

## 2021-01-01 RX ADMIN — MORPHINE SULFATE 5 MG: 100 SOLUTION ORAL at 12:22

## 2021-01-01 RX ADMIN — SENNOSIDES 8.6 MG: 8.6 TABLET, FILM COATED ORAL at 17:35

## 2021-01-01 RX ADMIN — TAMSULOSIN HYDROCHLORIDE 0.4 MG: 0.4 CAPSULE ORAL at 16:17

## 2021-01-01 RX ADMIN — MORPHINE SULFATE 5 MG: 100 SOLUTION ORAL at 17:38

## 2021-01-01 RX ADMIN — MORPHINE SULFATE 5 MG: 100 SOLUTION ORAL at 06:04

## 2021-01-01 RX ADMIN — SENNOSIDES 8.6 MG: 8.6 TABLET, FILM COATED ORAL at 17:01

## 2021-01-01 RX ADMIN — DIPHENHYDRAMINE HYDROCHLORIDE 25 MG: 25 CAPSULE ORAL at 21:06

## 2021-01-01 RX ADMIN — SENNOSIDES 8.6 MG: 8.6 TABLET, FILM COATED ORAL at 08:27

## 2021-01-01 RX ADMIN — TAMSULOSIN HYDROCHLORIDE 0.4 MG: 0.4 CAPSULE ORAL at 08:38

## 2021-01-01 RX ADMIN — MORPHINE SULFATE 5 MG: 100 SOLUTION ORAL at 06:14

## 2021-01-01 RX ADMIN — LORAZEPAM 0.5 MG: 1 TABLET ORAL at 12:00

## 2021-01-01 RX ADMIN — MORPHINE SULFATE 2 MG: 2 INJECTION, SOLUTION INTRAMUSCULAR; INTRAVENOUS at 11:23

## 2021-01-01 RX ADMIN — MORPHINE SULFATE 5 MG: 100 SOLUTION ORAL at 00:01

## 2021-01-01 RX ADMIN — MORPHINE SULFATE 5 MG: 100 SOLUTION ORAL at 06:09

## 2021-01-01 RX ADMIN — MORPHINE SULFATE 5 MG: 10 SOLUTION ORAL at 02:47

## 2021-01-01 RX ADMIN — SENNOSIDES 8.6 MG: 8.6 TABLET, FILM COATED ORAL at 08:58

## 2021-01-01 RX ADMIN — TAMSULOSIN HYDROCHLORIDE 0.4 MG: 0.4 CAPSULE ORAL at 08:27

## 2021-01-01 RX ADMIN — MORPHINE SULFATE 5 MG: 100 SOLUTION ORAL at 11:49

## 2021-01-01 RX ADMIN — MORPHINE SULFATE 5 MG: 100 SOLUTION ORAL at 06:25

## 2021-01-01 RX ADMIN — SENNOSIDES 8.6 MG: 8.6 TABLET, FILM COATED ORAL at 08:59

## 2021-01-01 RX ADMIN — MORPHINE SULFATE 5 MG: 100 SOLUTION ORAL at 17:12

## 2021-01-01 RX ADMIN — TAMSULOSIN HYDROCHLORIDE 0.4 MG: 0.4 CAPSULE ORAL at 08:59

## 2021-01-01 RX ADMIN — SENNOSIDES 8.6 MG: 8.6 TABLET, FILM COATED ORAL at 08:07

## 2021-01-01 RX ADMIN — SENNOSIDES 8.6 MG: 8.6 TABLET, FILM COATED ORAL at 12:08

## 2021-01-01 RX ADMIN — SENNOSIDES 8.6 MG: 8.6 TABLET, FILM COATED ORAL at 18:12

## 2021-01-01 RX ADMIN — TAMSULOSIN HYDROCHLORIDE 0.4 MG: 0.4 CAPSULE ORAL at 08:42

## 2021-01-01 RX ADMIN — LORAZEPAM 0.5 MG: 1 TABLET ORAL at 01:26

## 2021-01-01 RX ADMIN — MORPHINE SULFATE 5 MG: 100 SOLUTION ORAL at 17:21

## 2021-01-01 RX ADMIN — MORPHINE SULFATE 5 MG: 10 SOLUTION ORAL at 21:08

## 2021-01-01 RX ADMIN — MORPHINE SULFATE 5 MG: 10 SOLUTION ORAL at 21:06

## 2021-01-01 RX ADMIN — MORPHINE SULFATE 5 MG: 100 SOLUTION ORAL at 17:02

## 2021-01-01 RX ADMIN — MORPHINE SULFATE 5 MG: 100 SOLUTION ORAL at 18:22

## 2021-01-01 RX ADMIN — HALOPERIDOL LACTATE 2 MG: 5 INJECTION, SOLUTION INTRAMUSCULAR at 11:22

## 2021-01-01 RX ADMIN — MORPHINE SULFATE 5 MG: 100 SOLUTION ORAL at 12:20

## 2021-01-01 RX ADMIN — LORAZEPAM 0.5 MG: 1 TABLET ORAL at 03:18

## 2021-01-01 RX ADMIN — MORPHINE SULFATE 5 MG: 100 SOLUTION ORAL at 13:25

## 2021-01-01 RX ADMIN — MORPHINE SULFATE 5 MG: 100 SOLUTION ORAL at 11:20

## 2021-01-01 RX ADMIN — DIPHENHYDRAMINE HYDROCHLORIDE 25 MG: 25 CAPSULE ORAL at 21:18

## 2021-01-01 RX ADMIN — LORAZEPAM 0.5 MG: 1 TABLET ORAL at 05:50

## 2021-01-01 RX ADMIN — TAMSULOSIN HYDROCHLORIDE 0.4 MG: 0.4 CAPSULE ORAL at 10:13

## 2021-01-01 RX ADMIN — MORPHINE SULFATE 5 MG: 10 SOLUTION ORAL at 01:23

## 2021-01-01 RX ADMIN — LORAZEPAM 0.5 MG: 1 TABLET ORAL at 17:11

## 2021-01-01 RX ADMIN — MORPHINE SULFATE 5 MG: 100 SOLUTION ORAL at 12:09

## 2021-01-01 RX ADMIN — MORPHINE SULFATE 5 MG: 100 SOLUTION ORAL at 17:27

## 2021-01-01 RX ADMIN — HALOPERIDOL LACTATE 2 MG: 5 INJECTION, SOLUTION INTRAMUSCULAR at 15:00

## 2021-01-01 RX ADMIN — MORPHINE SULFATE 5 MG: 100 SOLUTION ORAL at 12:14

## 2021-01-01 RX ADMIN — TAMSULOSIN HYDROCHLORIDE 0.4 MG: 0.4 CAPSULE ORAL at 12:07

## 2021-01-01 RX ADMIN — MORPHINE SULFATE 5 MG: 100 SOLUTION ORAL at 17:00

## 2021-01-01 RX ADMIN — SENNOSIDES 8.6 MG: 8.6 TABLET, FILM COATED ORAL at 09:15

## 2021-01-01 RX ADMIN — HALOPERIDOL 2 MG: 1 TABLET ORAL at 21:06

## 2021-01-01 RX ADMIN — SENNOSIDES 8.6 MG: 8.6 TABLET, FILM COATED ORAL at 08:20

## 2021-01-01 RX ADMIN — MORPHINE SULFATE 5 MG: 100 SOLUTION ORAL at 17:35

## 2021-01-01 RX ADMIN — MORPHINE SULFATE 5 MG: 100 SOLUTION ORAL at 00:20

## 2021-01-01 RX ADMIN — HALOPERIDOL 2 MG: 1 TABLET ORAL at 10:13

## 2021-01-01 RX ADMIN — MORPHINE SULFATE 5 MG: 100 SOLUTION ORAL at 17:30

## 2021-01-01 RX ADMIN — MORPHINE SULFATE 5 MG: 100 SOLUTION ORAL at 12:53

## 2021-01-01 RX ADMIN — SENNOSIDES 8.6 MG: 8.6 TABLET, FILM COATED ORAL at 17:30

## 2021-01-01 RX ADMIN — MORPHINE SULFATE 5 MG: 100 SOLUTION ORAL at 05:47

## 2021-01-01 RX ADMIN — MORPHINE SULFATE 5 MG: 100 SOLUTION ORAL at 05:38

## 2021-01-01 RX ADMIN — LORAZEPAM 0.5 MG: 1 TABLET ORAL at 22:44

## 2021-01-01 RX ADMIN — MORPHINE SULFATE 5 MG: 10 SOLUTION ORAL at 03:17

## 2021-01-01 RX ADMIN — MORPHINE SULFATE 5 MG: 100 SOLUTION ORAL at 05:53

## 2021-01-01 RX ADMIN — MORPHINE SULFATE 5 MG: 100 SOLUTION ORAL at 11:48

## 2021-01-01 RX ADMIN — MORPHINE SULFATE 5 MG: 100 SOLUTION ORAL at 00:06

## 2021-01-01 RX ADMIN — MORPHINE SULFATE 5 MG: 100 SOLUTION ORAL at 06:10

## 2021-01-01 RX ADMIN — LORAZEPAM 0.5 MG: 1 TABLET ORAL at 20:53

## 2021-01-01 RX ADMIN — SENNOSIDES 8.6 MG: 8.6 TABLET, FILM COATED ORAL at 08:26

## 2021-01-01 RX ADMIN — MORPHINE SULFATE 5 MG: 100 SOLUTION ORAL at 00:07

## 2021-01-01 RX ADMIN — MORPHINE SULFATE 5 MG: 100 SOLUTION ORAL at 05:27

## 2021-01-01 RX ADMIN — MORPHINE SULFATE 5 MG: 10 SOLUTION ORAL at 07:38

## 2021-01-01 RX ADMIN — MORPHINE SULFATE 5 MG: 100 SOLUTION ORAL at 05:09

## 2021-01-01 RX ADMIN — SENNOSIDES 8.6 MG: 8.6 TABLET, FILM COATED ORAL at 17:22

## 2021-01-01 RX ADMIN — MORPHINE SULFATE 5 MG: 100 SOLUTION ORAL at 17:19

## 2021-01-01 RX ADMIN — MORPHINE SULFATE 5 MG: 100 SOLUTION ORAL at 23:26

## 2021-01-01 RX ADMIN — HALOPERIDOL 2 MG: 1 TABLET ORAL at 23:53

## 2021-01-01 RX ADMIN — MORPHINE SULFATE 10 MG: 10 SOLUTION ORAL at 21:08

## 2021-01-01 RX ADMIN — MORPHINE SULFATE 5 MG: 100 SOLUTION ORAL at 18:12

## 2021-01-01 RX ADMIN — BISACODYL 10 MG: 10 SUPPOSITORY RECTAL at 10:06

## 2021-01-01 RX ADMIN — MORPHINE SULFATE 5 MG: 100 SOLUTION ORAL at 18:18

## 2021-01-01 RX ADMIN — MORPHINE SULFATE 5 MG: 100 SOLUTION ORAL at 12:16

## 2021-01-01 RX ADMIN — SENNOSIDES 8.6 MG: 8.6 TABLET, FILM COATED ORAL at 17:27

## 2021-01-01 RX ADMIN — SENNOSIDES 8.6 MG: 8.6 TABLET, FILM COATED ORAL at 09:47

## 2021-01-01 RX ADMIN — TAMSULOSIN HYDROCHLORIDE 0.4 MG: 0.4 CAPSULE ORAL at 09:46

## 2021-01-01 RX ADMIN — HALOPERIDOL 2 MG: 1 TABLET ORAL at 21:18

## 2021-01-01 RX ADMIN — MORPHINE SULFATE 5 MG: 100 SOLUTION ORAL at 12:57

## 2021-01-01 RX ADMIN — GLYCOPYRROLATE 0.2 MG: 0.2 INJECTION INTRAMUSCULAR; INTRAVENOUS at 15:39

## 2021-01-01 RX ADMIN — HALOPERIDOL 2 MG: 1 TABLET ORAL at 00:26

## 2021-01-01 RX ADMIN — MORPHINE SULFATE 5 MG: 100 SOLUTION ORAL at 17:20

## 2021-01-01 RX ADMIN — SENNOSIDES 8.6 MG: 8.6 TABLET, FILM COATED ORAL at 10:13

## 2021-01-01 RX ADMIN — LORAZEPAM 0.5 MG: 1 TABLET ORAL at 23:56

## 2021-01-01 RX ADMIN — SENNOSIDES 8.6 MG: 8.6 TABLET, FILM COATED ORAL at 09:20

## 2021-01-01 RX ADMIN — MORPHINE SULFATE 5 MG: 100 SOLUTION ORAL at 13:50

## 2021-01-01 RX ADMIN — MORPHINE SULFATE 5 MG: 100 SOLUTION ORAL at 01:00

## 2021-01-01 RX ADMIN — MORPHINE SULFATE 5 MG: 100 SOLUTION ORAL at 23:47

## 2021-01-01 RX ADMIN — DIPHENHYDRAMINE HYDROCHLORIDE 25 MG: 25 CAPSULE ORAL at 22:27

## 2021-01-01 RX ADMIN — MORPHINE SULFATE 5 MG: 100 SOLUTION ORAL at 01:01

## 2021-01-01 RX ADMIN — MORPHINE SULFATE 2 MG: 2 INJECTION, SOLUTION INTRAMUSCULAR; INTRAVENOUS at 15:00

## 2021-01-01 RX ADMIN — SENNOSIDES 8.6 MG: 8.6 TABLET, FILM COATED ORAL at 17:37

## 2021-01-01 RX ADMIN — MORPHINE SULFATE 5 MG: 100 SOLUTION ORAL at 12:13

## 2021-01-01 RX ADMIN — BISACODYL 10 MG: 10 SUPPOSITORY RECTAL at 14:57

## 2021-01-01 RX ADMIN — LORAZEPAM 0.5 MG: 1 TABLET ORAL at 17:09

## 2021-01-01 RX ADMIN — TAMSULOSIN HYDROCHLORIDE 0.4 MG: 0.4 CAPSULE ORAL at 09:20

## 2021-01-01 RX ADMIN — TAMSULOSIN HYDROCHLORIDE 0.4 MG: 0.4 CAPSULE ORAL at 09:18

## 2021-01-01 RX ADMIN — SENNOSIDES 8.6 MG: 8.6 TABLET, FILM COATED ORAL at 18:22

## 2021-01-01 RX ADMIN — TAMSULOSIN HYDROCHLORIDE 0.4 MG: 0.4 CAPSULE ORAL at 10:29

## 2021-01-01 RX ADMIN — MORPHINE SULFATE 5 MG: 100 SOLUTION ORAL at 23:15

## 2021-01-01 RX ADMIN — LORAZEPAM 0.5 MG: 1 TABLET ORAL at 10:13

## 2021-01-01 RX ADMIN — MORPHINE SULFATE 5 MG: 100 SOLUTION ORAL at 23:36

## 2021-01-01 RX ADMIN — SENNOSIDES 8.6 MG: 8.6 TABLET, FILM COATED ORAL at 16:18

## 2021-01-01 RX ADMIN — MORPHINE SULFATE 5 MG: 100 SOLUTION ORAL at 05:30

## 2021-01-01 RX ADMIN — SENNOSIDES 8.6 MG: 8.6 TABLET, FILM COATED ORAL at 10:29

## 2021-01-01 RX ADMIN — MORPHINE SULFATE 5 MG: 10 SOLUTION ORAL at 22:44

## 2021-01-01 RX ADMIN — MORPHINE SULFATE 5 MG: 100 SOLUTION ORAL at 17:40

## 2021-01-01 RX ADMIN — MORPHINE SULFATE 5 MG: 100 SOLUTION ORAL at 23:21

## 2021-01-01 RX ADMIN — LORAZEPAM 0.5 MG: 1 TABLET ORAL at 23:15

## 2021-01-01 RX ADMIN — MORPHINE SULFATE 5 MG: 100 SOLUTION ORAL at 00:28

## 2021-01-01 RX ADMIN — HALOPERIDOL 2 MG: 1 TABLET ORAL at 23:21

## 2021-01-01 RX ADMIN — TAMSULOSIN HYDROCHLORIDE 0.4 MG: 0.4 CAPSULE ORAL at 09:15

## 2021-01-01 RX ADMIN — BISACODYL 10 MG: 10 SUPPOSITORY RECTAL at 01:26

## 2021-01-01 RX ADMIN — MORPHINE SULFATE 5 MG: 100 SOLUTION ORAL at 12:00

## 2021-01-01 RX ADMIN — MORPHINE SULFATE 5 MG: 100 SOLUTION ORAL at 05:23

## 2021-01-01 RX ADMIN — SENNOSIDES 8.6 MG: 8.6 TABLET, FILM COATED ORAL at 17:19

## 2021-01-01 RX ADMIN — MORPHINE SULFATE 5 MG: 100 SOLUTION ORAL at 05:14

## 2021-01-01 RX ADMIN — MORPHINE SULFATE 5 MG: 10 SOLUTION ORAL at 21:15

## 2021-01-01 RX ADMIN — MORPHINE SULFATE 5 MG: 100 SOLUTION ORAL at 09:14

## 2021-01-01 RX ADMIN — MORPHINE SULFATE 5 MG: 100 SOLUTION ORAL at 23:55

## 2021-01-01 RX ADMIN — MORPHINE SULFATE 2 MG: 2 INJECTION, SOLUTION INTRAMUSCULAR; INTRAVENOUS at 18:17

## 2021-01-01 RX ADMIN — MORPHINE SULFATE 5 MG: 100 SOLUTION ORAL at 05:03

## 2021-01-01 RX ADMIN — MORPHINE SULFATE 5 MG: 10 SOLUTION ORAL at 10:13

## 2021-01-01 RX ADMIN — LORAZEPAM 0.5 MG: 1 TABLET ORAL at 01:23

## 2021-01-01 RX ADMIN — SENNOSIDES 8.6 MG: 8.6 TABLET, FILM COATED ORAL at 17:20

## 2021-01-01 RX ADMIN — MORPHINE SULFATE 2 MG: 2 INJECTION, SOLUTION INTRAMUSCULAR; INTRAVENOUS at 15:39

## 2021-01-01 RX ADMIN — LORAZEPAM 0.5 MG: 1 TABLET ORAL at 23:03

## 2021-01-01 RX ADMIN — MORPHINE SULFATE 5 MG: 100 SOLUTION ORAL at 17:51

## 2021-01-01 RX ADMIN — LORAZEPAM 0.5 MG: 1 TABLET ORAL at 05:34

## 2021-01-01 RX ADMIN — SENNOSIDES 8.6 MG: 8.6 TABLET, FILM COATED ORAL at 17:40

## 2021-01-01 RX ADMIN — MORPHINE SULFATE 5 MG: 100 SOLUTION ORAL at 05:49

## 2021-01-01 RX ADMIN — MORPHINE SULFATE 10 MG: 10 SOLUTION ORAL at 05:23

## 2021-01-01 RX ADMIN — TAMSULOSIN HYDROCHLORIDE 0.4 MG: 0.4 CAPSULE ORAL at 08:20

## 2021-01-17 NOTE — DISCHARGE INSTRUCTIONS
Stop taking your beta-blocker/Tenormin. Follow-up with cardiology. Return for worsening or concerning symptoms.

## 2021-01-17 NOTE — ED TRIAGE NOTES
Pt arrives via EMS from his wife's  where he became weak and had to be lowered to a chair. EMS reports SB and hypotension at scene. . Pt has history of dementia

## 2021-01-17 NOTE — ED PROVIDER NOTES
42-year-old male with history of dementia, seizures, hypertension, high cholesterol, coronary artery disease presents with syncopal episode while at his wife's  today. He does not remember what happened and does not know that he is in the hospital.  He denies chest pain, headache, shortness of breath or other complaints. Syncope Associated symptoms include confusion. Pertinent negatives include no chest pain and no headaches. His past medical history is significant for syncope. Past Medical History:  
Diagnosis Date  Abnormal cardiovascular function study 3/18/2016  Arthritis  Coronary atherosclerosis of native coronary vessel 3/18/2016  Coronary atherosclerosis of native coronary vessel 3/18/2016  Dementia ([facility]  Dyspnea on exertion  HLD (hyperlipidemia) 3/18/2016  Hypertension  Seizures ([facility] GRAN MAL SEIZURE 19 YEARS AGO  Upper respiratory infection Past Surgical History:  
Procedure Laterality Date  HX ORTHOPAEDIC    
 LEFT HIP  
 HX OTHER SURGICAL    
 AV MALFORMATION REPAIR No family history on file. Social History Socioeconomic History  Marital status:  Spouse name: Not on file  Number of children: Not on file  Years of education: Not on file  Highest education level: Not on file Occupational History  Not on file Social Needs  Financial resource strain: Not on file  Food insecurity Worry: Not on file Inability: Not on file  Transportation needs Medical: Not on file Non-medical: Not on file Tobacco Use  Smoking status: Former Smoker  Smokeless tobacco: Never Used  Tobacco comment: 8943 Substance and Sexual Activity  Alcohol use: Yes Alcohol/week: 11.7 standard drinks Types: 14 Glasses of wine per week  Drug use: No  
 Sexual activity: Never Lifestyle  Physical activity Days per week: Not on file Minutes per session: Not on file  Stress: Not on file Relationships  Social connections Talks on phone: Not on file Gets together: Not on file Attends Faith service: Not on file Active member of club or organization: Not on file Attends meetings of clubs or organizations: Not on file Relationship status: Not on file  Intimate partner violence Fear of current or ex partner: Not on file Emotionally abused: Not on file Physically abused: Not on file Forced sexual activity: Not on file Other Topics Concern  Not on file Social History Narrative  Not on file ALLERGIES: Patient has no known allergies. Review of Systems Unable to perform ROS: Dementia Respiratory: Negative for cough and shortness of breath. Cardiovascular: Positive for syncope. Negative for chest pain. Neurological: Positive for syncope. Negative for headaches. Psychiatric/Behavioral: Positive for confusion. Vitals:  
 01/17/21 1236 BP: 128/61 Pulse: (!) 50 Resp: 18 Temp: 97.7 °F (36.5 °C) SpO2: 91% Weight: 86.2 kg (190 lb) Height: 5' 9\" (1.753 m) Physical Exam 
Vitals signs and nursing note reviewed. Constitutional:   
   Appearance: He is well-developed. HENT:  
   Head: Normocephalic and atraumatic. Eyes:  
   Pupils: Pupils are equal, round, and reactive to light. Neck: Musculoskeletal: Normal range of motion and neck supple. Cardiovascular:  
   Rate and Rhythm: Regular rhythm. Bradycardia present. Heart sounds: Normal heart sounds. Pulmonary:  
   Effort: Pulmonary effort is normal.  
   Breath sounds: Normal breath sounds. Abdominal:  
   Palpations: Abdomen is soft. Tenderness: There is no abdominal tenderness. Musculoskeletal: Normal range of motion. Skin: 
   General: Skin is warm and dry. Neurological:  
   Mental Status: He is alert. Psychiatric:     
   Attention and Perception: He is inattentive. Behavior: Behavior normal.     
   Cognition and Memory: Memory is impaired. MDM Number of Diagnoses or Management Options Diagnosis management comments: Parts of this document were created using dragon voice recognition software. The chart has been reviewed but errors may still be present. I wore appropriate PPE throughout this patient's ED visit. Geo Rooney MD, 12:44 PM 
 
12:53 PM 
Nonobstructive LAD calcification on prior cath in 2009. Baseline bradycardia on beta blocker. Now in the 45s and 46s. Will discuss with cardiology to discuss possibly d/cing tenormin. 1:46 PM 
dw Dr Autumn Dutton, cardiology. Agreed to stop beta blocker I discussed the results of all labs, procedures, radiographs, and treatments with the patient and available family. Treatment plan is agreed upon and the patient is ready for discharge. Questions about treatment in the ED and differential diagnosis of presenting condition were answered. Patient was given verbal discharge instructions including, but not limited to, importance of returning to the emergency department for any concern of worsening or continued symptoms. Instructions were given to follow up with a primary care provider or specialist within 1-2 days. Adverse effects of medications, if prescribed, were discussed and patient was advised to refrain from significant physical activity until followed up by primary care physician and to not drive or operate heavy machinery after taking any sedating substances. Amount and/or Complexity of Data Reviewed Clinical lab tests: ordered and reviewed (Results for orders placed or performed during the hospital encounter of 01/17/21 
-CBC WITH AUTOMATED DIFF Result                      Value             Ref Range      WBC                         10.5              4.3 - 11.1 K* 
     RBC                         4.38              4.23 - 5.6 M* 
 HGB                         12.7 (L)          13.6 - 17.2 * HCT                         40.8 (L)          41.1 - 50.3 % MCV                         93.2              79.6 - 97.8 * MCH                         29.0              26.1 - 32.9 * MCHC                        31.1 (L)          31.4 - 35.0 * RDW                         14.2              11.9 - 14.6 % PLATELET                    247               150 - 450 K/* MPV                         9.7               9.4 - 12.3 FL ABSOLUTE NRBC               0.00              0.0 - 0.2 K/* DF                          AUTOMATED NEUTROPHILS                 72                43 - 78 % LYMPHOCYTES                 15                13 - 44 % MONOCYTES                   9                 4.0 - 12.0 % EOSINOPHILS                 2                 0.5 - 7.8 % BASOPHILS                   1                 0.0 - 2.0 % IMMATURE GRANULOCYTES       1                 0.0 - 5.0 %   
     ABS. NEUTROPHILS            7.6               1.7 - 8.2 K/* ABS. LYMPHOCYTES            1.6               0.5 - 4.6 K/* ABS. MONOCYTES              1.0               0.1 - 1.3 K/* ABS. EOSINOPHILS            0.2               0.0 - 0.8 K/* ABS. BASOPHILS              0.1               0.0 - 0.2 K/* ABS. IMM. GRANS.            0.1               0.0 - 0.5 K/* 
-METABOLIC PANEL, COMPREHENSIVE Result                      Value             Ref Range Sodium                      142               136 - 145 mm* Potassium                   4.1               3.5 - 5.1 mm* Chloride                    105               98 - 107 mmo* CO2                         29                21 - 32 mmol* Anion gap                   8                 7 - 16 mmol/L      Glucose                     104 (H)           65 - 100 mg/* 
 BUN                         17                8 - 23 MG/DL Creatinine                  1.02              0.8 - 1.5 MG* 
     GFR est AA                  >60               >60 ml/min/1* GFR est non-AA              >60               >60 ml/min/1* Calcium                     8.5               8.3 - 10.4 M* Bilirubin, total            0.6               0.2 - 1.1 MG* ALT (SGPT)                  16                12 - 65 U/L   
     AST (SGOT)                  19                15 - 37 U/L Alk. phosphatase            54                50 - 136 U/L Protein, total              5.6 (L)           6.3 - 8.2 g/* Albumin                     2.8 (L)           3.2 - 4.6 g/* Globulin                    2.8               2.3 - 3.5 g/* A-G Ratio                   1.0 (L)           1.2 - 3.5     
) Procedures

## 2021-01-17 NOTE — ED NOTES
I have reviewed discharge instructions with the patient and daughter. The daughter verbalized understanding. Patient left ED via Discharge Method: ambulatory to Home with daughter Opportunity for questions and clarification provided. Patient given 0 scripts. To continue your aftercare when you leave the hospital, you may receive an automated call from our care team to check in on how you are doing. This is a free service and part of our promise to provide the best care and service to meet your aftercare needs.  If you have questions, or wish to unsubscribe from this service please call [phone]. Thank you for Choosing our [address] [place] [address] Emergency Department.

## 2021-03-04 PROBLEM — J96.91 RESPIRATORY FAILURE WITH HYPOXIA (HCC): Status: ACTIVE | Noted: 2021-01-01

## 2021-03-04 PROBLEM — G30.1 LATE ONSET ALZHEIMER'S DISEASE WITHOUT BEHAVIORAL DISTURBANCE (HCC): Status: ACTIVE | Noted: 2018-08-07

## 2021-03-04 PROBLEM — M79.652 PAIN OF LEFT THIGH: Status: ACTIVE | Noted: 2017-06-27

## 2021-03-04 PROBLEM — F02.80 LATE ONSET ALZHEIMER'S DISEASE WITHOUT BEHAVIORAL DISTURBANCE (HCC): Status: ACTIVE | Noted: 2018-08-07

## 2021-03-04 PROBLEM — E78.2 MIXED HYPERLIPIDEMIA: Status: RESOLVED | Noted: 2018-11-14 | Resolved: 2021-01-01

## 2021-03-04 PROBLEM — I82.519 CHRONIC DEEP VEIN THROMBOSIS (DVT) OF FEMORAL VEIN (HCC): Chronic | Status: RESOLVED | Noted: 2018-10-15 | Resolved: 2021-01-01

## 2021-03-04 PROBLEM — C78.00 MALIGNANT NEOPLASM METASTATIC TO LUNG (HCC): Status: ACTIVE | Noted: 2021-01-01

## 2021-03-04 PROBLEM — R41.0 CONFUSION: Status: ACTIVE | Noted: 2017-11-01

## 2021-03-04 PROBLEM — C80.1 METASTASIS TO LUNG OF UNKNOWN ORIGIN (HCC): Status: ACTIVE | Noted: 2021-01-01

## 2021-03-04 PROBLEM — G89.3 CANCER ASSOCIATED PAIN: Status: ACTIVE | Noted: 2021-01-01

## 2021-03-04 PROBLEM — C78.00 METASTASIS TO LUNG OF UNKNOWN ORIGIN (HCC): Status: ACTIVE | Noted: 2021-01-01

## 2021-03-04 PROBLEM — R31.29 MICROSCOPIC HEMATURIA: Status: RESOLVED | Noted: 2017-11-01 | Resolved: 2021-01-01

## 2021-03-04 PROBLEM — R31.29 MICROSCOPIC HEMATURIA: Status: ACTIVE | Noted: 2017-11-01

## 2021-03-04 PROBLEM — I82.512 CHRONIC DEEP VEIN THROMBOSIS (DVT) OF LEFT FEMORAL VEIN (HCC): Status: ACTIVE | Noted: 2018-08-07

## 2021-03-04 PROBLEM — C80.1 PRIMARY CANCER OF UNKNOWN SITE AND CELL TYPE (HCC): Status: ACTIVE | Noted: 2021-01-01

## 2021-03-04 PROBLEM — I10 ESSENTIAL HYPERTENSION WITH GOAL BLOOD PRESSURE LESS THAN 130/85: Status: RESOLVED | Noted: 2018-11-14 | Resolved: 2021-01-01

## 2021-03-04 PROBLEM — R07.89 OTHER CHEST PAIN: Status: ACTIVE | Noted: 2018-11-13

## 2021-03-04 NOTE — PROGRESS NOTES
Problem: Hospice Orientation  Goal: Demonstrate understanding of hospice philosophy, plan of care, and home hospice program  Description: The patient/family/caregiver will demonstrate understanding of hospice philosophy, plan of care and the home hospice program as evidenced by participation in meeting the patient's psychosocial, spiritual, medical, and physical needs inclusive of medical supplies/equipment focusing on symptoms. Outcome: Progressing Towards Goal  Note: Family would be able to state hospice principles  Give family members a blue book     Problem: Pain  Goal: Verbalize satisfaction of level of comfort and symptom control  Outcome: Progressing Towards Goal  Note: Pt pain would be less then 4/10  Morphine SQ q 20 minutes prn      Problem: Anxiety/Agitation  Goal: Verbalize and demonstrate ability to manage anxiety  Description: The patient/family/caregiver will verbalize and demonstrate ability to manage the patient's anxiety throughout hospice care. Outcome: Progressing Towards Goal  Note: Pt would be relaxed as indicated by no moaning, groaning, agitation or attempting to get out of bed.   Haldol 2 mg Sq q 1 hour prn     Problem: Breathing Pattern - Ineffective  Goal: *PALLIATIVE CARE:  Alleviation of Dyspnea  Outcome: Progressing Towards Goal  Note: Pt respirations would be less then 24/minute and unlabored  Morphine SQ q 1 hour prn

## 2021-03-04 NOTE — PROGRESS NOTES
Mr Tree Moran is an 81year old male admitted to room 111 with a hospice diagnosis of metastatic cancer of unknown origin with associated diagnosis of respiratory failure and metabolic encephalopathy. His level of care is domiciliary. Pt is alert but confused not able to state her name or date of birth. Pt arrived incontinent of urine, urine when nurse with assistant attempted to do incontinent care pt hollered, cursed and resisted attempts to turn him. Physical assessment completed. Lung sounds clear, oxygen on at 2 liters per nasal cannula, heart sounds slow with irregular heart rate, abdomen soft with active bowel sounds, pt incontinent of clear yellow urine. Extremities cool with palpable pulses. Pt has stage 1 to sacrum and bilateral heels. Did incontinent care, applied brief and elevated extremities on pillow. Applied tab alerts x 2 and bed alarm under pt.     1540 pt resting quietly, no grimacing, groaning or agitation. 5395 pt daughter, Jose Armando Banks, and son, Eugenia Melchor, at bedside. Oriented them to hospice and to the room. [address] Greg Miller at bedside. 1826 pt resting quietly with eyes open, no grimacing, or groaning .     Report given to [facility] RN

## 2021-03-04 NOTE — HSPC IDG NURSE NOTES
Patient: Coby Reynolds    Date: 03/04/21  Time: 5:40 PM    [facility] Nurse Notes      Mr Jake Cadena is an 81year old male admitted to room 111 with a hospice diagnosis of metastatic cancer of unknown origin with associated diagnosis of respiratory failure and metabolic encephalopathy. His level of care is domiciliary. Pt is alert but confused not able to state her name or date of birth. Pt arrived incontinent of urine, urine when nurse with assistant attempted to do incontinent care pt hollered, cursed and resisted attempts to turn him. Physical assessment completed. Lung sounds clear, oxygen on at 2 liters per nasal cannula, heart sounds slow with irregular heart rate, abdomen soft with active bowel sounds, pt incontinent of clear yellow urine. Extremities cool with palpable pulses. Pt has stage 1 to sacrum and bilateral heels. Did incontinent care, applied brief and elevated extremities on pillow.  Applied tab alerts x 2 and bed alarm under pt.        Signed by: Eben Kaiser RN

## 2021-03-05 NOTE — PROGRESS NOTES
1900 Report received from Kadie Barron RN. Pt identified by name and . Pt is under GIP care with a hospice diagnosis metastatic cancer of unknown origin. Pt is alert with confusion. Complete care. Incontinent of b/b. o2 @2l/min via nasal cannula. Pt resting quietly in bed watching TV; no signs of pain or distress noted. RR non labored. No s/sx NVD or SOB. Bed in lowest and locked position with siderails x2; call light within reach and tab alert in place. FLACC 0/10.     2108 Scheduled medication given per orders. Pt resting in bed watching the [employer]OL[employer] channel; no signs of pain or distress noted. RR non labored. No signs of NVD or SOB. FLACC 0/10.    6039 Pt resting quietly in bed with eyes closed; no signs or pain or discomfort noted. RR non labored. No s/sx NVD or SOB. FLACC 0/10.     0119 Pt resting peacefully in bed with eyes closed; no signs or pain or discomfort noted. RR non labored. No s/sx NVD or SOB. FLACC 0/10.     0321 Pt resting calmly in bed with eyes closed; no signs or pain or discomfort noted. RR non labored. No s/sx NVD or SOB. FLACC 0/10.     0516 Pt sleeping quietly; no pain or distress noted. RR non labored. No s/sx NVD or SOB. FLACC 0/10. Walking rounds completed with Kadie Barron RN.

## 2021-03-05 NOTE — PROGRESS NOTES
Problem: Pain  Goal: Verbalize satisfaction of level of comfort and symptom control  Outcome: Progressing Towards Goal  Note: Pain would be less then 4/10  Roxanol 5 mg po q 6 hours scheduled  Roxanol 5 mg po q 1 hour prn      Problem: Anxiety/Agitation  Goal: Verbalize and demonstrate ability to manage anxiety  Description: The patient/family/caregiver will verbalize and demonstrate ability to manage the patient's anxiety throughout hospice care.   Outcome: Progressing Towards Goal  Note: Pt would not be moaning, groaning or agitated  Haldol po q 1 hour prn     Problem: Breathing Pattern - Ineffective  Goal: *PALLIATIVE CARE:  Alleviation of Dyspnea  Outcome: Progressing Towards Goal  Note: Pt respirations would be less then 24/hour  Roxanol 5 mg po q 1 hour prn

## 2021-03-05 NOTE — PROGRESS NOTES
Report received from [facility] RN visual identification made, assumed care of pt. Pt resting quietly with eyes closed, no agitation or restlessness, no grimacing or groaning. Pt respirations unlabored. Tab alert in place, rails up x 2, bed in lowest position, safety maintained. FLACC 0.    0800 pt resting quietly. No grimacing, groaning or agitation    1005 pt awakened with verbal stimuli, pt took medications crushed in pudding, was unable to suck on a straw but drank water from a syringe. placed nitroglycerin patch on left arm. Lung sounds clear, heart sounds regular, pt alert and agreeable but garbled speech. Abdomen soft with active bowel sounds. Pt brief was dry. Extremities warm with palpable pulses. 1120 pt sitting up in chair and resting with eyes closed. Tab alert in place    1228 fed pt 75% of his lunch. 1238 pt assisted back to bed, pt resting quietly     1400 pt resting quietly, no grimacing, groaning or agitation. 81 pt daughter, Liam Beverage son, Demetria Atkinson at bedside with questions about his day. Answered questions and asked her to bring the atenolol. Pt continues to rest quietly     1733 pt sitting up being fed. Pt took his scheduled medications crushed in pudding and swallowed without problem.

## 2021-03-05 NOTE — HSPC IDG CHAPLAIN NOTES
Patient: Tushar Dutton Abbi    Date: 03/05/21  Time: 9:35 AM    [facility]  Notes    Assessment pending for spiritual and bereavement support.       Signed by: Jessie Daley

## 2021-03-05 NOTE — PROGRESS NOTES
Background: Mr Seven Godinez is an 81year old gentleman who is here under routine status in domiciliary care. According to the chart he has a diagnosis of cancer. According to [facility], RN patient also has alzheimer's disease. Mr. Lovely Pompa)   Became a  on . He and his wife have 5 living children,  Darnell Ellison (Dilan), Gay Mary Carmen. Caren Garcia), Norm Kraftmone uHber), Cirilo Rajan and Nelly Curran Bah[place].)  Their son Poonam Morgan is . They have 16 grandchildren and 7 great grandchildren. 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 Mr. Lovely Orta is a [place]. His [place] is [employer]. He is a member of [employer]. He has received Anointing of The Sick. Mr. 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 Assessment:   Patient appears to be comfortably  resting peacefully at this time. His eyes are closed. No sign of distress or pain.  offered prayer.  left a copy of the Serenity Prayer and a booklet on  coping when someone you love is dying.  provided a business card. Note: According to the nurse patient's daughter Zeb Mireles is coming this evening. Plan:    Provide spiritual and emotional support throughout patient's time with [facility]. Spiritual rituals as appropriate. Grief support for patient and family.

## 2021-03-05 NOTE — HSPC IDG NURSE NOTES
Patient: Lio Dc    Date: 03/05/21  Time: 2:37 PM    hospitals Nurse Notes  1st IDG: Pt is a 81year old male with primary cancer of unknown site and cell type who is here DOM level of care for management of pain. Voiding x 2   IV access: None   PO intake: Eating bites and liquids with a syringe  Oxygen:2/L  Wounds: Stage I Sacrum / Coccyx, Bilateral Heels Stage I  PRN medications: None   Scheduled meds:  Tenormin 25mg QD / Roxanol 10mg Q8 / Nitroderm 0.4mg patch / Senokot 8.6mg BID / Flomax 0.4mg QD  Plan: Comprehensive plan of care reviewed. IDG and pt./family in agreement with plan of care. The IDG identifies through on-going assessment when a change is needed to the POC; the pt/family will receive care and services necessitated by changes in POC. Medications reviewed by the pharmacist and Medical Director.           Signed by: Komal Balderas RN

## 2021-03-05 NOTE — HSPC IDG CHAPLAIN NOTES
Patient: Liseth Leo    Date: 03/05/21  Time: 8:45 AM    [facility]  Notes  / Grief Counselor has reviewed  Initial Comprehensive Assessment and plan of care. Bereavement and Spiritual Care Assessments to be completed and plan of care put in place to meet the needs, requests and referrals.         Signed by: Hao Milton

## 2021-03-05 NOTE — PROGRESS NOTES
Problem: Falls - Risk of  Goal: *Absence of Falls  Description: Document Earma Amada Fall Risk and appropriate interventions in the flowsheet. Outcome: Progressing Towards Goal  Note: Fall Risk Interventions:       Mentation Interventions: Door open when patient unattended, Adequate sleep, hydration, pain control, Bed/chair exit alarm    Medication Interventions: Bed/chair exit alarm    Elimination Interventions: Bed/chair exit alarm, Call light in reach              Problem: Patient Education: Go to Patient Education Activity  Goal: Patient/Family Education  Outcome: Progressing Towards Goal     Problem: Pressure Injury - Risk of  Goal: *Prevention of pressure injury  Description: Document Jose Scale and appropriate interventions in the flowsheet.   Outcome: Progressing Towards Goal  Note: Pressure Injury Interventions:  Sensory Interventions: Assess changes in LOC, Float heels, Keep linens dry and wrinkle-free, Minimize linen layers, Pressure redistribution bed/mattress (bed type)    Moisture Interventions: Absorbent underpads, Maintain skin hydration (lotion/cream), Minimize layers    Activity Interventions: Pressure redistribution bed/mattress(bed type)    Mobility Interventions: Float heels, HOB 30 degrees or less, Pressure redistribution bed/mattress (bed type)    Nutrition Interventions: Document food/fluid/supplement intake    Friction and Shear Interventions: Apply protective barrier, creams and emollients, HOB 30 degrees or less, Lift sheet, Minimize layers                Problem: Patient Education: Go to Patient Education Activity  Goal: Patient/Family Education  Outcome: Progressing Towards Goal     Problem: Hospice Orientation  Goal: Demonstrate understanding of hospice philosophy, plan of care, and home hospice program  Description: The patient/family/caregiver will demonstrate understanding of hospice philosophy, plan of care and the home hospice program as evidenced by participation in meeting the patient's psychosocial, spiritual, medical, and physical needs inclusive of medical supplies/equipment focusing on symptoms. Outcome: Progressing Towards Goal     Problem: Pain  Goal: Verbalize satisfaction of level of comfort and symptom control  Outcome: Progressing Towards Goal     Problem: Anxiety/Agitation  Goal: Verbalize and demonstrate ability to manage anxiety  Description: The patient/family/caregiver will verbalize and demonstrate ability to manage the patient's anxiety throughout hospice care. Outcome: Progressing Towards Goal     Problem: End of Life Process  Goal: Demonstrate understanding of end of life processes  Description: Patient/caregiver will understand end of life processes.   Outcome: Progressing Towards Goal     Problem: Breathing Pattern - Ineffective  Goal: *PALLIATIVE CARE:  Alleviation of Dyspnea  Outcome: Progressing Towards Goal

## 2021-03-05 NOTE — PROGRESS NOTES
Problem: Anticipatory Grief  Goal: Grief heard and acknowledged, anxiety reduced, patient coping identified, patient/family expressed gratitude  Description: Patient / Family will acknowledge feelings  of grief and anxiety and utilize available support to help them cope throughout their grief journey.    Outcome: Progressing Towards Goal

## 2021-03-05 NOTE — [facility] IDG MASTER NOTE
[facility]ospice Interdisciplinary Group Collaborative  Date: 03/05/21  Time: 2:38 PM    ___________________    Patient: Leyla Easley  Coverage Information:     Payor: [place] [employer]     Plan: [place] MEDICARE PART A AND B     Subscriber ID: [id number]     Phone Number:   MRN: [id number]    Current Benefit Period: Benefit Period 1  Start Date: 3/4/2021  End Date: 6/1/2021        Hospice Attending Provider: Tushar Timmons [address] [place] [address]  [address]  Phone: [phone]  Fax: [phone]    Level of Care: Routine Home Care      ___________________    Diagnoses: The primary encounter diagnosis was Pain of left thigh. 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     Current Medications:    Current Facility-Administered Medications:     morphine (ROXANOL) concentrated oral syringe 5 mg, 5 mg, Oral, Q6H, Rae Mon NP    atenoloL (TENORMIN) tablet 25 mg (Patient Supplied), 25 mg, Oral, DAILY, Jennifer Mari NP, Stopped at 03/05/21 0900    tamsulosin (FLOMAX) capsule 0.4 mg, 0.4 mg, Oral, DAILY, Tushar Timmons MD, 0.4 mg at 03/05/21 0946    nitroglycerin (NITRODUR) 0.4 mg/hr patch 1 Patch, 1 Patch, TransDERmal, DAILY, Tushar Timmons MD, 1 Patch at 03/05/21 0941    nitroglycerin (NITROSTAT) tablet 0.4 mg, 0.4 mg, SubLINGual, Q5MIN PRN, Tushar Timmons MD    morphine (ROXANOL) concentrated oral syringe 5 mg, 5 mg, Oral, Q1H PRN **OR** morphine (ROXANOL) concentrated oral syringe 5 mg, 5 mg, SubLINGual, Q1H PRN, Tushar Timmons MD    acetaminophen (TYLENOL) tablet 650 mg, 650 mg, Oral, Q4H PRN, Tushar Timmons MD    haloperidoL (HALDOL) tablet 2 mg, 2 mg, Oral, Q1H PRN, Tk Jimenez, Jesse Simon MD    diphenhydrAMINE (BENADRYL) capsule 25 mg, 25 mg, Oral, Q6H PRN, Dion Soto MD    LORazepam (ATIVAN) tablet 0.5 mg, 0.5 mg, Oral, Q6H PRN, Dion Soto MD    senna (SENOKOT) tablet 8.6 mg, 1 Tab, Oral, BID, Dion Soto MD, 8.6 mg at 03/05/21 4357    benzonatate (TESSALON) capsule 200 mg, 200 mg, Oral, Q6H PRN, Dion Soto MD    alum-mag hydroxide-simeth (MYLANTA) oral suspension 30 mL, 30 mL, Oral, QID PRN, Dion Soto MD    albuterol (PROVENTIL VENTOLIN) nebulizer solution 2.5 mg, 2.5 mg, Nebulization, Q4H PRN, Dion Soto MD    hyoscyamine SL (LEVSIN/SL) tablet 0.125 mg, 0.125 mg, SubLINGual, Q4H PRN, Dion Soto MD    LORazepam (ATIVAN) injection 2 mg, 2 mg, IntraVENous, Q10MIN PRN, Dion Soto MD    Orders:  Orders Placed This Encounter    IP CONSULT TO SPIRITUAL CARE     Standing Status:   Standing     Number of Occurrences:   1     Order Specific Question:   Reason for Consult: Answer: Once on week one, then PRN. For [facility] Patients Only. For contracted patients, their primary hospice will continue to manage spiritual care needs.  DIET REGULAR     Standing Status:   Standing     Number of Occurrences:   1    VITAL SIGNS     Standing Status:   Standing     Number of Occurrences:   1    NURSING-MISCELLANEOUS: admit 3/4: (Juliette) Admitted to domiciliary level of care. 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 Hospice Dx: Widely metastatic cancer of undetermined cell type and site . .. 3/4: (Juliette) Admitted to domiciliary level of care. 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 Associated Dx:  Extensive bilateral pulmonary metastases, respiratory failure with hypoxia, and metabolic encephalopathy.       Non Associated Dx:  DVT, CHF, Alzheimer's disease, and HTN. Standing Status:   Standing     Number of Occurrences:   1     Order Specific Question:   Description of Order:     Answer:   admit    VITAL SIGNS     Standing Status:   Standing     Number of Occurrences:   99623    NURSING-MISCELLANEOUS: Comfort Care Measures CONTINUOUS     Standing Status:   Standing     Number of Occurrences:   1     Order Specific Question:   Description of Order:     Answer:   Comfort Care Measures    BLADDER CHECKS     May scan bladder PRN for urinary retention and or patient discomfort     Standing Status:   Standing     Number of Occurrences:   [id number]    NURSING ASSESSMENT:  SPECIFY Assess for GIP, routine, or respite level of care. Q SHIFT Routine     Standing Status:   Standing     Number of Occurrences:   1     Order Specific Question:   Please describe the test or procedure you would like to order. Answer:   Assess for GIP, routine, or respite level of care.  PAIN ASSESSMENT Pain and Symptoms: Assess ever 4 hours and PRN, for GIP level of care. PRN Routine     Standing Status:   Standing     Number of Occurrences:   [id number]     Order Specific Question:   Please describe the test or procedure you would like to order. Answer:   Pain and Symptoms: Assess ever 4 hours and PRN, for GIP level of care.  WOUND CARE, DRESSING CHANGE     Wound Care:  Location: sacrum/coccyx  Stage I ([place]avalon)- Cleanse wound location with wound cleanser, pat dry and apply Cavilon Durable Barrier Cream every 12 hours and PRN if soiled. Turn every 2 hours. Assess with each nursing assessment visit. Standing Status:   Standing     Number of Occurrences:   30    WOUND CARE, DRESSING CHANGE     Wound Care:   Location: left heel   Stage I ([place])- Cleanse wound location with wound cleanser, pat dry and apply Cavilon Durable Barrier Cream every 12 hours and PRN if soiled. Turn every 2 hours. Assess with each nursing assessment visit.      Standing Status: Standing     Number of Occurrences:   30    WOUND CARE, DRESSING CHANGE     Wound Care:   Location: right heel   Stage I ([place])- Cleanse wound location with wound cleanser, pat dry and apply Cavilon Durable Barrier Cream every 12 hours and PRN if soiled. Turn every 2 hours. Assess with each nursing assessment visit. Standing Status:   Standing     Number of Occurrences:   30    ACTIVITY AS TOLERATED W/ASSIST     May use bedside commode     Standing Status:   Standing     Number of Occurrences:   1    DO NOT RESUSCITATE     Standing Status:   Standing     Number of Occurrences:   1     Order Specific Question:   Comfort Measures Only? Answer: Yes    OXYGEN CANNULA Liters per minute: 2; Indications for O2 therapy: RESPIRATORY DISTRESS CONTINUOUS Routine     Standing Status:   Standing     Number of Occurrences:   1     Order Specific Question:   Liters per minute: Answer:   2     Order Specific Question:   Indications for O2 therapy     Answer:   RESPIRATORY DISTRESS    atenoloL (TENORMIN) 25 mg tablet     Sig: Take 25 mg by mouth daily.  melatonin 5 mg tablet     Sig: Take 5 mg by mouth nightly.  atenoloL (TENORMIN) tablet 25 mg (Patient Supplied)     OP SIG:Take 25 mg by mouth daily.  tamsulosin (FLOMAX) capsule 0.4 mg     OP SIG:Take 0.4 mg by mouth daily.  nitroglycerin (NITRODUR) 0.4 mg/hr patch 1 Patch     OP SI Patch by TransDERmal route daily.  nitroglycerin (NITROSTAT) tablet 0.4 mg     OP SI Tab by SubLINGual route every five (5) minutes as needed for Chest Pain.       OR Linked Order Group     morphine (ROXANOL) concentrated oral syringe 5 mg     morphine (ROXANOL) concentrated oral syringe 5 mg    acetaminophen (TYLENOL) tablet 650 mg    haloperidoL (HALDOL) tablet 2 mg    diphenhydrAMINE (BENADRYL) capsule 25 mg    DISCONTD: acetaminophen (TYLENOL) tablet 650 mg    LORazepam (ATIVAN) tablet 0.5 mg    senna (SENOKOT) tablet 8.6 mg    benzonatate (TESSALON) capsule 200 mg    DISCONTD: haloperidoL (HALDOL) tablet 2 mg    alum-mag hydroxide-simeth (MYLANTA) oral suspension 30 mL    albuterol (PROVENTIL VENTOLIN) nebulizer solution 2.5 mg     Order Specific Question:   MODE OF DELIVERY     Answer:   Nebulizer    hyoscyamine SL (LEVSIN/SL) tablet 0.125 mg    LORazepam (ATIVAN) injection 2 mg    DISCONTD: morphine (ROXANOL) concentrated oral syringe 10 mg    morphine (ROXANOL) concentrated oral syringe 5 mg    INITIAL PHYSICIAN ORDER: HOSPICE Level Of Care: Routine; Reason for Admission: new onset widely extensive bilateral pulmonary metastatic cncer of unknown primary, dyspnea, incident chest/back pain, metabbolic encephalopathy     Standing Status:   Standing     Number of Occurrences:   1     Order Specific Question:   Status     Answer:   Hospice     Order Specific Question:   Level Of Care     Answer:   Routine     Order Specific Question:   Reason for Admission     Answer:   new onset widely extensive bilateral pulmonary metastatic cncer of unknown primary, dyspnea, incident chest/back pain, metabbolic encephalopathy     Order Specific Question:   Inpatient Hospitalization Certified Necessary for the Following Reasons     Answer:   9.  Other (further clarification in H&P documentation)     Order Specific Question:   Admitting Diagnosis     Answer:   Metastasis to lung of unknown origin, unspecified laterality [facility]) [[id number]]     Order Specific Question:   Terminal Prognosis Diagnosis(es)     Answer:   Respiratory failure with hypoxia [facility]) [[id number]]     Order Specific Question:   Terminal Prognosis Diagnosis(es)     Answer:   Cancer associated pain [[id number]]     Order Specific Question:   Terminal Prognosis Diagnosis(es)     Answer:   Dyspnea and respiratory abnormalities [[id number]]     Order Specific Question:   Terminal Prognosis Diagnosis(es)     Answer:   Cough in adult patient [[id number]]     Order Specific Question:   Terminal Prognosis Diagnosis(es)     Answer:   Fatigue [[id number]]     Order Specific Question:   Terminal Prognosis Diagnosis(es)     Answer:   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 [facility]) [[id number]]     Order Specific Question:   Terminal Prognosis Diagnosis(es)     Answer:   BPH (benign prostatic hyperplasia) [[id number]]     Order Specific Question:   Terminal Prognosis Diagnosis(es)     Answer:   DVT femoral (deep venous thrombosis) with thrombophlebitis, left [facility]) [[id number]]     Order Specific Question:   Terminal Prognosis Diagnosis(es)     Answer:   Alzheimer's dementia without behavioral disturbance [facility]) [[id number]]     Order Specific Question:   Terminal Prognosis Diagnosis(es)     Answer:   QXVGP [[id number]]     Order Specific Question:   Admitting Physician     Answer:   Mike Villegas [[id number]]     Order Specific Question:   Attending Physician     Answer:   Teto Hewitt     Order Specific Question:   Discharge Plan:     Answer:   Home with Home Hospice    IP CONSULT TO SOCIAL WORK     Standing Status:   Standing     Number of Occurrences:   1     Order Specific Question:   Reason for Consult: Answer: For [facility] Only. For contracted patients, their primary hospice will continue to manage social work needs. Allergies:  No Known Allergies    Care Plan:  Multidisciplinary Problems (Active)     Problem: Anxiety/Agitation     Dates: Start: 03/04/21       Disciplines: Interdisciplinary    Goal: Verbalize and demonstrate ability to manage anxiety     Dates: Start: 03/04/21   Expected End: 03/13/21       Description: The patient/family/caregiver will verbalize and demonstrate ability to manage the patient's anxiety throughout hospice care. Disciplines: Interdisciplinary    Intervention: Assess for anxiety/agitation     Dates: Start: 03/04/21       Description: Assess for signs and symptoms of anxiety and agitation.           Intervention: Instruction strategies to reduce anxiety/agitation     Dates: Start: 03/04/21       Description: Instruct patient/caregiver on strategies to reduce anxiety/agitation. Problem: Breathing Pattern - Ineffective     Dates: Start: 03/04/21       Disciplines: Nurse, Interdisciplinary, RT    Goal: *PALLIATIVE CARE:  Alleviation of Dyspnea     Dates: Start: 03/04/21   Expected End: 03/13/21       Disciplines: Nurse, Interdisciplinary, RT    Intervention: Refer patient for holistic services per facility     Dates: Start: 03/04/21                         Problem: End of Life Process     Dates: Start: 03/04/21       Disciplines: Interdisciplinary    Goal: Demonstrate understanding of end of life processes     Dates: Start: 03/04/21   Expected End: 03/13/21       Description: Ron Quarles will understand end of life processes. Disciplines: Interdisciplinary    Intervention: Assess for signs/symptoms of terminal restlessness     Dates: Start: 03/04/21             Intervention: Gretel Rodriguez on strategies to reduce terminal restlessness     Dates: Start: 03/04/21             Intervention: Instruct on the dying process     Dates: Start: 03/04/21             Intervention: Instruct: imminent death     Dates: Start: 03/04/21             Intervention: Instruct: process at end of life     Dates: Start: 03/04/21                         Problem: Falls - Risk of     Dates: Start: 03/04/21       Disciplines: Interdisciplinary    Goal: *Absence of Falls     Dates: Start: 03/04/21   Expected End: 03/13/21       Description: Document Mary Starch Fall Risk and appropriate interventions in the flowsheet.     Disciplines: Interdisciplinary                Problem: Hospice Orientation     Dates: Start: 03/04/21       Disciplines: Interdisciplinary    Goal: Demonstrate understanding of hospice philosophy, plan of care, and home hospice program     Dates: Start: 03/04/21   Expected End: 03/13/21       Description: The patient/family/caregiver will demonstrate understanding of hospice philosophy, plan of care and the home hospice program as evidenced by participation in meeting the patient's psychosocial, spiritual, medical, and physical needs inclusive of medical supplies/equipment focusing on symptoms. Disciplines: Interdisciplinary    Intervention: Instruct on hospice philosophy     Dates: Start: 03/04/21             Intervention: Instruct: hospice orientation     Dates: Start: 03/04/21             Intervention: Instruct: medical equipment     Dates: Start: 03/04/21       Description: Instruct patient/caregiver on medical equipment and supplies.           Intervention: Instruct: medical power of  and will     Dates: Start: 03/04/21             Intervention: Instruct:terminal illness     Dates: Start: 03/04/21                         Problem: Pain     Dates: Start: 03/04/21       Disciplines: Interdisciplinary    Goal: Verbalize satisfaction of level of comfort and symptom control     Dates: Start: 03/04/21   Expected End: 03/13/21       Disciplines: Interdisciplinary    Intervention: Assess effectiveness of pain management     Dates: Start: 03/04/21             Intervention: Assess for signs/symptoms of acute pain     Dates: Start: 03/04/21             Intervention: Assess for signs/symptoms of chronic pain     Dates: Start: 03/04/21             Intervention: Instruct on non-pharmacological pain management     Dates: Start: 03/04/21             Intervention: Instruct on pain scales     Dates: Start: 03/04/21             Intervention: Instruct on pharmacological pain management     Dates: Start: 03/04/21                         Problem: Patient Education: Go to Patient Education Activity     Dates: Start: 03/04/21       Disciplines: Interdisciplinary    Goal: Patient/Family Education     Dates: Start: 03/04/21       Disciplines: Interdisciplinary                Problem: Patient Education: Go to Patient Education Activity     Dates: Start: 03/04/21       Disciplines: Interdisciplinary    Goal: Patient/Family Education     Dates: Start: 03/04/21       Disciplines: Interdisciplinary                Problem: Pressure Injury - Risk of     Dates: Start: 03/04/21       Disciplines: Interdisciplinary    Goal: *Prevention of pressure injury     Dates: Start: 03/04/21   Expected End: 03/13/21       Description: Document Jose Scale and appropriate interventions in the flowsheet.     Disciplines: Interdisciplinary                  Care Plan Problems/Goals      Progressing Towards Goal (9)      *Absence of Falls (Falls - Risk of)    Disciplines:  Interdisciplinary Expected end:  03/13/21        Outcome: Progressing Towards Goal By Gurinder Blakely on 03/05/21 0356            Patient/Family Education (Patient Education: Go to Patient Education Activity)    Disciplines:  Interdisciplinary Expected end:  -        Outcome: Progressing Towards Goal By Gurinder Blakely on 03/05/21 0356            *Prevention of pressure injury (Pressure Injury - Risk of)    Disciplines:  Interdisciplinary Expected end:  03/13/21        Outcome: Progressing Towards Goal By Gurinder Blakely on 03/05/21 0356            Patient/Family Education (Patient Education: Go to Patient Education Activity)    Disciplines:  Interdisciplinary Expected end:  -        Outcome: Progressing Towards Goal By Gurinder Blakely on 03/05/21 0356            Demonstrate understanding of hospice philosophy, plan of care, and home hospice program (Hospice Orientation)    Disciplines:  Interdisciplinary Expected end:  03/13/21        Outcome: Progressing Towards Goal By Gurinder Blakely on 03/05/21 0356            Verbalize satisfaction of level of comfort and symptom control (Pain)    Disciplines:  Interdisciplinary Expected end:  03/13/21        Outcome: Progressing Towards Goal By Gurinder Blakely on 03/05/21 0356            Verbalize and demonstrate ability to manage anxiety (Anxiety/Agitation)    Disciplines: Interdisciplinary Expected end:  03/13/21        Outcome: Progressing Towards Goal By [facility] on 03/05/21 8471            Demonstrate understanding of end of life processes (End of Life Process)    Disciplines:  Interdisciplinary Expected end:  03/13/21        Outcome: Progressing Towards Goal By [facility] on 03/05/21 0356            *PALLIATIVE CARE:  Alleviation of Dyspnea (Breathing Pattern - Ineffective)    Disciplines:  Nurse, Interdisciplinary, RT Expected end:  03/13/21        Outcome: Progressing Towards Goal By [facility] on 03/05/21 0356                              ___________________    Care Team Notes          POC/IDG Notes      [facility] IDG Nurse Notes by Kevin Bran RN at 03/05/21 1437  Version 1 of 1    Author: Kevin Brna RN Service: NURSING Author Type: Registered Nurse    Filed: 03/05/21 1437 Date of Service: 03/05/21 1437 Status: Signed    : Kevin Bran RN (Registered Nurse)       Patient: Coby Reynolds    Date: 03/05/21  Time: 2:37 PM    [facility] Nurse Notes  1st IDG: Pt is a 81year old male with primary cancer of unknown site and cell type who is here DOM level of care for management of pain. Voiding x 2   IV access: None   PO intake: Eating bites and liquids with a syringe  Oxygen:2/L  Wounds: Stage I Sacrum / Coccyx, Bilateral Heels Stage I  PRN medications: None   Scheduled meds:  Tenormin 25mg QD / Roxanol 10mg Q8 / Nitroderm 0.4mg patch / Senokot 8.6mg BID / Flomax 0.4mg QD  Plan: Comprehensive plan of care reviewed. IDG and pt./family in agreement with plan of care. The IDG identifies through on-going assessment when a change is needed to the POC; the pt/family will receive care and services necessitated by changes in POC. Medications reviewed by the pharmacist and Medical Director.           Signed by: Belle Mcmahan RN       [address] [place] [address] IDG  Notes by Betsy Aparicio at 03/05/21 0922  Version 1 of 1    Author: Betsy Aparicio Service: Spiritual Care Author Type: Pastoral Care    Filed: 03/05/21 1236 Date of Service: 03/05/21 0935 Status: Signed    : Daivd Guaman (Pastoral Care)       Patient: Delgado Quiroga    Date: 03/05/21  Time: 9:35 AM    [facility]  Notes    Assessment pending for spiritual and bereavement support. Signed by: Nataliya Marcos       [facility] IDG  Notes by aFrheen Jones LMSW at 03/05/21 1045  Version 1 of 1    Author: Farheen Jones LMSW Service: Licensed Clinical  Author Type:     Filed: 03/05/21 1045 Date of Service: 03/05/21 1045 Status: Signed    : Farheen Jones LMSW ()       SW to assess coping and needs each visit and offer availability. [facility] IDG  Notes by Catina Prater LMSW at 03/05/21 1003  Version 1 of 1    Author: Catina Prater LMSW Service: Licensed Clinical  Author Type: Licensed Masters in Social Work    Filed: 03/05/21 1004 Date of Service: 03/05/21 1003 Status: Signed    : Catina Prater LMSW (Licensed Masters in Social Work)       [place] has reviewed the initial  Comprehensive RN assessment and POC and agrees       [facility] IDG  Notes by David Guaman at 03/05/21 0845  Version 1 of 1    Author: David Guaman Service: Spiritual Care Author Type: Pastoral Care    Filed: 03/05/21 0846 Date of Service: 03/05/21 0845 Status: Signed    : David Guaman (Pastoral Care)       Patient: Delgado Quiroga    Date: 03/05/21  Time: 8:45 AM    [facility]  Notes  / Grief Counselor has reviewed  Initial Comprehensive Assessment and plan of care. Bereavement and Spiritual Care Assessments to be completed and plan of care put in place to meet the needs, requests and referrals.         Signed by: Nataliya Marcos       [facility] IDG Nurse Notes by Ada Hollingsworth RN at 03/04/21 8782  Version 1 of 1    Author: Ada Hollingsworth RN Service: NURSING Author Type: Registered Nurse    Filed: 03/04/21 1740 Date of Service: 03/04/21 8504 Status: Signed    : Cristóbal Ball RN (Registered Nurse)       Patient: Ria Smith    Date: 03/04/21  Time: 5:40 PM    [facility] Nurse Notes      Mr Aldo Cummings is an 81year old male admitted to room 111 with a hospice diagnosis of metastatic cancer of unknown origin with associated diagnosis of respiratory failure and metabolic encephalopathy. His level of care is domiciliary. Pt is alert but confused not able to state her name or date of birth. Pt arrived incontinent of urine, urine when nurse with assistant attempted to do incontinent care pt hollered, cursed and resisted attempts to turn him. Physical assessment completed. Lung sounds clear, oxygen on at 2 liters per nasal cannula, heart sounds slow with irregular heart rate, abdomen soft with active bowel sounds, pt incontinent of clear yellow urine. Extremities cool with palpable pulses. Pt has stage 1 to sacrum and bilateral heels. Did incontinent care, applied brief and elevated extremities on pillow.  Applied tab alerts x 2 and bed alarm under pt.        Signed by: Cindy Ge RN                Care Team Present:

## 2021-03-05 NOTE — PROGRESS NOTES
Patient is an 81year old  male admitted to [facility] with a diagnosis of metastatic cancer of unknown origin. He is recently  in January of 2021. He and his spouse have multiple children, Karan Maza, Jackie, Melchor Worrell and Ricardo. Ricardo visits daily. Mr Jordin Boyd is a routine level care patient at this time. He  receives support from his [employer], [employer]. His developed a local [employer] for their business.

## 2021-03-06 NOTE — PROGRESS NOTES
0645: Report received from off going RN. Pt admitted on 3/4/2021 for domiciliary care due to no caregivers available at home. 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 Pt did not require anything for symptom control during the night. Resting with eyes closed in bed, resps regular and non-labored. FLACC score 0/10.     0830: Attempted to give meds and pt refused at this time. Will attempt later on. FLACC score 0/10. Wanting to sleep. 1325: Daughters at the bedside. NTG patch applied to left upper arm. Scheduled morphine administered without difficulty. Pt with some small blister like dry areas on abdomen. Provider notified. No other voiced requests. Pt does not seem to be any distress or discomfort. FLACC score 0/10.     1500: No family in room. Pt resting with eyes closed resps regular and non-labored. FLACC score 0/10.     1721: Scheduled medications administered whole without difficulty. FLACC score remains 0/10. Pt pleasantly confused, no s/sx of pain or other discomfort observed. Daughter at bedside feeding pt. No voiced requests at this time. 1835: Report given to oncoming RN.

## 2021-03-06 NOTE — H&P
History and Physical    Patient: Casandra Sidhu MRN: [id number]  SSN: [id number]    YOB: 1935  Age: 81 y.o. Sex: male      Subjective:      Casandra Son is a 81 y.o. 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 Hospice associated diagnoses are grief, extensive bilateral pulmonary metastases, hypoxic respiratory failure and metabolic encephalopathy. Non-associated diagnoses are DVT, CHF, Alzheimer's disease without behavioral disturbance and hypertension. He has been a resident at an [facility] due to his Alzheimer's disease without behavioral disturbance. He experienced a syncopal episode at his wife's  in 2021. She passed away due to pneumonia. He was sent to the ER from the [facility] on 21 due to hypoxia and lethargy. CT of the head revealed microvascular ischemia. Chest xray revealed bilateral pneumonia. CT of the chest revealed innumerable hematogenous metastases. He was initially started on IV antibiotics for pneumonia but CT of the chest confirmed metastatic disease instead of pneumonia. His family chose not to have biopsy confirmation or further workup done due to his poor prognosis and medical history. Due to his recent decline, his family has elected to forgo further medical treatment and pursue comfort measures with hospice care. Without further treatment, his life expectancy is less than 30 days. Patient admitted to domiciliary level of care with hospice diagnosis of 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      Past Medical History:   Diagnosis Date    Arthritis     AVM (arteriovenous malformation)     s/p repair    Back pain     Coronary atherosclerosis of native coronary vessel 3/18/2016    Dementia (HCC)     Dyspnea on exertion     HLD (hyperlipidemia) 3/18/2016    Hypertension     IBS (irritable bowel syndrome)     Seizures (HCC)     GRAN MAL SEIZURE 19 YEARS AGO    Sick sinus syndrome ([facility] 3/21/2016    Upper respiratory infection      Past Surgical History:   Procedure Laterality Date    HX COLONOSCOPY      HX HEART CATHETERIZATION      HX HIP REPLACEMENT Bilateral     HX KNEE ARTHROSCOPY      HX OTHER SURGICAL      AV MALFORMATION REPAIR      Family History   Problem Relation Age of Onset    No Known Problems Mother     Dementia Father      Social History     Tobacco Use    Smoking status: Former Smoker     Packs/day: 1.00     Years: 22.00     Pack years: 22.00     Quit date: 3/3/1977     Years since quittin.0    Smokeless tobacco: Never Used    Tobacco comment:    Substance Use Topics    Alcohol use: Not Currently      Prior to Admission medications    Medication Sig Start Date End Date Taking? Authorizing Provider   escitalopram oxalate (Lexapro) 5 mg tablet Take 5 mg by mouth daily. Provider, Historical   lisinopril-hydroCHLOROthiazide (Zestoretic) 20-12.5 mg per tablet Take 2 Tabs by mouth daily. Provider, Historical   memantine ER (NAMENDA XR) 28 mg capsule Take 28 mg by mouth daily. Provider, Historical   atenoloL (TENORMIN) 25 mg tablet Take 25 mg by mouth daily. 3/4/21 4/3/21  Provider, Historical   rivaroxaban (Xarelto) 20 mg tab tablet Take 20 mg by mouth daily. Provider, Historical   omeprazole (PRILOSEC) 20 mg capsule Take 20 mg by mouth daily. Provider, Historical   nitroglycerin (NITRODUR) 0.4 mg/hr 1 Patch by TransDERmal route daily. 18   Tree Tamez MD   atorvastatin (LIPITOR) 40 mg tablet Take 1 Tab by mouth daily. 10/15/18   Christine Garrett MD   celecoxib (CELEBREX) 200 mg capsule Take 200 mg by mouth daily. Provider, Historical   tamsulosin (FLOMAX) 0.4 mg capsule Take 0.8 mg by mouth daily. Provider, Historical   donepezil (ARICEPT) 5 mg tablet Take 5 mg by mouth nightly.  3/6/16   Provider, Historical        No Known Allergies    Review of Systems:  Review of systems not obtained due to patient factors. Objective:     Vitals:    03/04/21 1610 03/05/21 0537 03/05/21 1805   BP: 133/60 (!) 158/77 (!) 100/57   Pulse: (!) 49 (!) 57 81   Resp: 14 16 18   Temp: 97.4 °F (36.3 °C) 97.7 °F (36.5 °C) 97.4 °F (36.3 °C)        Physical Exam:  GENERAL: fatigued, cooperative, no distress, appears stated age, drowsy, garbled speech  LUNG: Clear diminished breath sounds with unlabored respirations. HEART: irregularly irregular rhythm  ABDOMEN: soft, non-tender. Bowel sounds active. EXTREMITIES:  extremities with no cyanosis or edema. + pulses. SKIN: Warm to touch. Stage 1 areas to sacrum and bilateral heels. NEUROLOGIC: Drowsy. Garbled speech. Generalized weakness. Able to ambulate with assist.     Assessment:     Hospital Problems  Date Reviewed: 3/7/2021          Codes Class Noted POA    Respiratory failure with hypoxia ([facility] ICD-10-CM: J96.91  ICD-9-CM: 518.81  3/4/2021 Yes        Cancer associated pain ICD-10-CM: G89.3  ICD-9-CM: 338.3  3/4/2021 Yes        Metabolic encephalopathy JRA-59-KZ: G93.41  ICD-9-CM: 348.31  3/7/2021 Unknown        Hospice care patient ICD-10-CM: Z51.5  ICD-9-CM: V66.7  3/7/2021 Unknown        Bilateral pulmonary metastases ([facility] ICD-10-CM: C78.01, C78.02  ICD-9-CM: 197.0  3/7/2021 Unknown        HCAP (healthcare-associated pneumonia) ICD-10-CM: J18.9  ICD-9-CM: 551  3/7/2021 Unknown        Metastasis to lung of unknown origin [facility]) ICD-10-CM: C78.00, C80.1  ICD-9-CM: 197.0, 199.1  3/4/2021 Unknown        * (Principal) Primary cancer of unknown site and cell type ([facility] ICD-10-CM: C80.1  ICD-9-CM: 199.1  3/4/2021 Unknown        Malignant neoplasm metastatic to lung [facility]) ICD-10-CM: C78.00  ICD-9-CM: 197.0  3/1/2021 Yes    Overview Signed 3/4/2021  5:50 PM by Sobeida Castro MD     Innumerable bilateral pulmonary metastases diagnosed on CT chest 2/28/2021. Unknown primary.                    Plan:     Current Facility-Administered Medications   Medication Dose Route Frequency    morphine (ROXANOL) concentrated oral syringe 5 mg  5 mg Oral Q6H    atenoloL (TENORMIN) tablet 25 mg (Patient Supplied)  25 mg Oral DAILY    tamsulosin (FLOMAX) capsule 0.4 mg  0.4 mg Oral DAILY    nitroglycerin (NITRODUR) 0.4 mg/hr patch 1 Patch  1 Patch TransDERmal DAILY    nitroglycerin (NITROSTAT) tablet 0.4 mg  0.4 mg SubLINGual Q5MIN PRN    morphine (ROXANOL) concentrated oral syringe 5 mg  5 mg Oral Q1H PRN    Or    morphine (ROXANOL) concentrated oral syringe 5 mg  5 mg SubLINGual Q1H PRN    acetaminophen (TYLENOL) tablet 650 mg  650 mg Oral Q4H PRN    haloperidoL (HALDOL) tablet 2 mg  2 mg Oral Q1H PRN    diphenhydrAMINE (BENADRYL) capsule 25 mg  25 mg Oral Q6H PRN    LORazepam (ATIVAN) tablet 0.5 mg  0.5 mg Oral Q6H PRN    senna (SENOKOT) tablet 8.6 mg  1 Tab Oral BID    benzonatate (TESSALON) capsule 200 mg  200 mg Oral Q6H PRN    alum-mag hydroxide-simeth (MYLANTA) oral suspension 30 mL  30 mL Oral QID PRN    albuterol (PROVENTIL VENTOLIN) nebulizer solution 2.5 mg  2.5 mg Nebulization Q4H PRN    hyoscyamine SL (LEVSIN/SL) tablet 0.125 mg  0.125 mg SubLINGual Q4H PRN    LORazepam (ATIVAN) injection 2 mg  2 mg IntraVENous Q10MIN PRN       3/4: (Juliette) [facility] diagnosis of 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 1. Pain: Morphine 5mg IV/SQ Q6 and q1 hour as needed. Nitroglycerin 0.4mg patch in place. 2. Dyspnea: Morphine 5mg IV/SQ Q6 and q1 hour as needed. Duonebs q4 prn. Levsin 0.125mg q4 prn secretions. Oxygen at 2 L/min prn.    3. Agitation: Haloperidol 2mg PO Q1 hour prn and Lorazepam 0.5mg PO q6 hours prn.    4. Family/Pt Support: No family at bedside during exam. Medications and plan of care discussed with nursing staff. Will continue to monitor for symptoms and adjust medications as needed to maintain patient comfort. PPS 30%. Case discussed with Dr. Danyell Choe.       Signed By: Pio Granados Cedrick Vaca, ADELSO     March 5, 2021

## 2021-03-06 NOTE — PROGRESS NOTES
190 Walking rounds completed with Nelly White RN. Pt identified by name and . Pt is under GIP care with a hospice diagnosis metastatic cancer of unknown origin. Pt is alert with confusion; pt able to voice simple needs. Complete care. Incontinent of b/b. o2 @2l/min via nasal cannula. Pt resting quietly in bed watching TV; no signs of pain or distress noted. RR non labored. No s/sx NVD or SOB. Bed in lowest and locked position with siderails x2; call light within reach and tab alert in place. FLACC 0/10.     2119 Pt resting quietly in bed with eyes closed; no signs or pain or discomfort noted. RR non labored. No s/sx NVD or SOB. FLACC 0/10.      2141 Nitroglycerin patch remove and disposed of per facility guidelines. Pt resting quietly in bed with eyes closed; no signs or pain or discomfort noted. RR non labored. No s/sx NVD or SOB. FLACC 0/10.     0028 Scheduled medication given per orders. Pt resting quietly in bed with eyes closed; no signs or pain or discomfort noted. RR non labored. No s/sx NVD or SOB. FLACC 0/10.     0222 Pt resting quietly in bed with eyes closed; no signs or pain or discomfort noted. RR non labored. No s/sx NVD or SOB. FLACC 010.     0411 Pt resting calmly in bed with eyes closed; no signs or pain or discomfort noted. RR non labored. No s/sx NVD or SOB. FLACC 0/10.     4158  Scheduled medication given per orders.  Pt resting quietly in bed with eyes closed; no signs or pain or discomfort noted. RR non labored. No s/sx NVD or SOB. FLACC 0/10. Walking rounds with Nelly White RN.

## 2021-03-06 NOTE — PROGRESS NOTES
1800: Report given to oncoming RN. Pt in bed resting with eyes closed, resps regular and non-labored. FLACC score 0/10.     1845: Report given to oncoming RN.

## 2021-03-07 PROBLEM — G93.41 METABOLIC ENCEPHALOPATHY: Status: ACTIVE | Noted: 2021-01-01

## 2021-03-07 PROBLEM — C78.01 BILATERAL PULMONARY METASTASES (HCC): Status: ACTIVE | Noted: 2021-01-01

## 2021-03-07 PROBLEM — I50.9 CHF (CONGESTIVE HEART FAILURE) (HCC): Status: ACTIVE | Noted: 2021-01-01

## 2021-03-07 PROBLEM — Z51.5 HOSPICE CARE PATIENT: Status: ACTIVE | Noted: 2021-01-01

## 2021-03-07 PROBLEM — C78.02 BILATERAL PULMONARY METASTASES (HCC): Status: ACTIVE | Noted: 2021-01-01

## 2021-03-07 PROBLEM — J18.9 HCAP (HEALTHCARE-ASSOCIATED PNEUMONIA): Status: ACTIVE | Noted: 2021-01-01

## 2021-03-07 NOTE — PROGRESS NOTES
Bedside Report taken from [facility]. Pt identified. Pt in bed with eyes closed; displaying no signs or symptoms of pain, dyspnea, agitation,seizures, nausea, or vomiting. FLACC 0. Bed locked and low, side rails up, tabs/bed alarm in place for pt. safety. Call light with in reach, and door opened for continued monitoring. Care plan reviewed with Cna.     2954 Pt admitted to hospice for lung cancer of unknown origin. Pt admitted to [address] [place] [address]. 0841 Po medication and nitro patch given. Pt currently being feed by cna  1105 family aware the Dr. Suzan Us will come give communion. 1309 po medication given and pt repositioned  1720 po medication given. Report to be given to [facility].

## 2021-03-07 NOTE — PROGRESS NOTES
Problem: Falls - Risk of  Goal: *Absence of Falls  Description: Document Ammon Morrell Fall Risk and appropriate interventions in the flowsheet. Outcome: Progressing Towards Goal  Note: Fall Risk Interventions:       Mentation Interventions: Adequate sleep, hydration, pain control, Bed/chair exit alarm, Door open when patient unattended    Medication Interventions: Bed/chair exit alarm    Elimination Interventions: Bed/chair exit alarm, Call light in reach              Problem: Pressure Injury - Risk of  Goal: *Prevention of pressure injury  Description: Document Jose Scale and appropriate interventions in the flowsheet.   Outcome: Progressing Towards Goal  Note: Pressure Injury Interventions:  Sensory Interventions: Assess changes in LOC, Float heels, Keep linens dry and wrinkle-free, Minimize linen layers, Pressure redistribution bed/mattress (bed type)    Moisture Interventions: Absorbent underpads, Maintain skin hydration (lotion/cream), Minimize layers, Moisture barrier    Activity Interventions: Assess need for specialty bed, Pressure redistribution bed/mattress(bed type)    Mobility Interventions: Float heels, HOB 30 degrees or less, Pressure redistribution bed/mattress (bed type)    Nutrition Interventions: Document food/fluid/supplement intake    Friction and Shear Interventions: Apply protective barrier, creams and emollients, HOB 30 degrees or less, Minimize layers                Problem: Pain  Goal: Verbalize satisfaction of level of comfort and symptom control  Outcome: Progressing Towards Goal     Problem: Patient Education: Go to Patient Education Activity  Goal: Patient/Family Education  Outcome: Resolved/Met     Problem: Patient Education: Go to Patient Education Activity  Goal: Patient/Family Education  Outcome: Resolved/Met     Problem: Hospice Orientation  Goal: Demonstrate understanding of hospice philosophy, plan of care, and home hospice program  Description: The patient/family/caregiver will demonstrate understanding of hospice philosophy, plan of care and the home hospice program as evidenced by participation in meeting the patient's psychosocial, spiritual, medical, and physical needs inclusive of medical supplies/equipment focusing on symptoms.   Outcome: Resolved/Met

## 2021-03-07 NOTE — PROGRESS NOTES
Walking rounds completed with report received. Pt identified by name/. Agitated. Trying to constantly exit bed,pulling off brief. Unable to redirect. Medicated with Ativan 0.5 mg given po for agitation. Safety maintained. Pt has bed alarm and tab alarm for safety. CNA in with pt until he calms. Bed in low and locked position with side rails up x 2 and call light in reach. 1945 Resting peacefully with eyes closed. Respirations non labored. FLACC 0/10. No s/sx of pain,anxiety,nausea or distress. Bed in low and locked position with side rails up x 2 with call light in reach. 2200 Continues to rest peacefully with eyes closed. Respirations non labored. FLACC 0/10. No s/sx of pain,anxiety,nausea or distress. Bed in low and locked position with side rails up x 2 with call light in reach. Door left open as pt is unaccompanied. 0020 Awakened for medication. Scheduled Morphine 5 mg given po as ordered. Tolerated well. Drowsy. Safety maintained. FLACC 0/10. No s/sx of pain,anxiety,nausea or distress. Bed in low and locked position with side rails up x 2 with call light in reach. 5325 Nitrodur 0.4mg/hr patch removed as per order. 0350  Resting peacefully with eyes closed. Respirations non labored. FLACC 0/10. No s/sx of pain,anxiety,nausea or distress. Bed in low and locked position with side rails up x 2 with call light in reach. 0547 Roxanol 5 mg given po as scheduled. Tolerated well. Denies any needs. FLACC 0/10. No s/sx of pain,anxiety,nausea or distress. Safety maintained.      Report to be given to [facility]

## 2021-03-08 NOTE — PROGRESS NOTES
Report received. Pt round. Pt identified by name. In bed with eyes closed. No s/s of pain, agitation or dyspnea. Bed low and SR up with tab alerts on.    0830 Pt being fed by CNA. 0920 AM care given and morning meds given. Nitro patch placed on left upper arm. 1115 Pt sleeping. No s/s of distress noted. 1216 Scheduled Roxanol 5 mg PO given. Pt is drowsy and has not eaten any lunch. 1419 Pt sleeping. No s/s of distress. FLACC 0.     1500 Pt trying to get out of bed. Pt with saturated brief. Changed and up to chair with CNA staying with pt since pt is continually trying to get up to walk and requires assist x 2 to transfer from bed to chair. 1700 Pt remains up in chair with CNA at his side. RN took over and assisted pt with feeding. Pt continuing to try to get up.  sat with pt and assisted as well. Scheduled meds given. 1711 Ativan 0.5 mg PO for anxiety. 1730 Pt back to bed with assist x 2. Bed alarm and tab alerts in place. SR up and bed in low and locked position. Lights dim. 1815 Pt sleeping.      Report given to oncoming RN

## 2021-03-08 NOTE — PROGRESS NOTES
Problem: Falls - Risk of  Goal: *Absence of Falls  Description: Document Green Salvia Fall Risk and appropriate interventions in the flowsheet. Outcome: Progressing Towards Goal  Note: Fall Risk Interventions:       Mentation Interventions: Adequate sleep, hydration, pain control, Bed/chair exit alarm, Door open when patient unattended    Medication Interventions: Bed/chair exit alarm    Elimination Interventions: Bed/chair exit alarm, Call light in reach              Problem: Pressure Injury - Risk of  Goal: *Prevention of pressure injury  Description: Document Jose Scale and appropriate interventions in the flowsheet. Outcome: Progressing Towards Goal  Note: Pressure Injury Interventions:  Sensory Interventions: Assess changes in LOC, Float heels, Keep linens dry and wrinkle-free, Minimize linen layers, Pressure redistribution bed/mattress (bed type)    Moisture Interventions: Absorbent underpads, Maintain skin hydration (lotion/cream), Minimize layers, Moisture barrier    Activity Interventions: Assess need for specialty bed, Pressure redistribution bed/mattress(bed type)    Mobility Interventions: Float heels, HOB 30 degrees or less, Pressure redistribution bed/mattress (bed type)    Nutrition Interventions: Document food/fluid/supplement intake    Friction and Shear Interventions: Apply protective barrier, creams and emollients, HOB 30 degrees or less, Minimize layers                Problem: Pain  Goal: Verbalize satisfaction of level of comfort and symptom control  Outcome: Progressing Towards Goal     Problem: Anxiety/Agitation  Goal: Verbalize and demonstrate ability to manage anxiety  Description: The patient/family/caregiver will verbalize and demonstrate ability to manage the patient's anxiety throughout hospice care.   Outcome: Progressing Towards Goal

## 2021-03-08 NOTE — PROGRESS NOTES
Progress Note    Patient: Lily Grissom MRN: [id number]  SSN: [id number]    YOB: 1935  Age: 81 y.o. Sex: male      Admit Date: 3/4/2021    LOS: 4 days     Subjective:     Drowsy, garbled speech. Has to be fed meals, unable to feed self. Able to take meds crushed, otherwise he chews them. Review of Systems:  Review of systems not obtained due to patient factors. Objective:     Vitals:    03/06/21 1634 03/07/21 0538 03/07/21 1609 03/08/21 0601   BP: 107/62 128/67 (!) 108/59 133/86   Pulse: (!) 59 66 69 70   Resp: 16 16 18 16   Temp: 98 °F (36.7 °C) (!) 95.7 °F (35.4 °C) 98 °F (36.7 °C)         Intake and Output:  Current Shift: 03/08 0701 - 03/08 1900  In: 120 [P.O.:120]  Out: -   Last three shifts: 03/06 1901 - 03/08 0700  In: -   Out: 1 [Urine:1]    Physical Exam:   GENERAL: fatigued, cooperative, no distress, appears stated age, drowsy, garbled speech  LUNG: Clear diminished breath sounds with unlabored respirations. HEART: irregularly irregular rhythm  ABDOMEN: soft, non-tender. Bowel sounds active. : Incontinent  EXTREMITIES:  extremities with no cyanosis or edema. + pulses. SKIN: Warm to touch. Stage 1 areas to sacrum and bilateral heels. NEUROLOGIC: Drowsy. Garbled speech. Generalized weakness. Able to ambulate with assist.     Lab/Data Review:  No new labs resulted in the last 24 hours. Assessment:     Principal Problem:    Primary cancer of unknown site and cell type ([address] (3/4/2021)    Active Problems:    Respiratory failure with hypoxia ([address] (3/4/2021)      Cancer associated pain (3/4/2021)      Malignant neoplasm metastatic to lung [facility]) (3/1/2021)      Overview: Innumerable bilateral pulmonary metastases diagnosed on CT chest       2/28/2021. Unknown primary.       Metastasis to lung of unknown origin [facility]) (5/6/1261)      Metabolic encephalopathy (1/0/8565)      Hospice care patient (3/7/2021)      Bilateral pulmonary metastases ([facility] (3/7/2021) HCAP (healthcare-associated pneumonia) (3/7/2021)        Plan:     Current Facility-Administered Medications   Medication Dose Route Frequency    morphine (ROXANOL) concentrated oral syringe 5 mg  5 mg Oral Q6H    tamsulosin (FLOMAX) capsule 0.4 mg  0.4 mg Oral DAILY    nitroglycerin (NITRODUR) 0.4 mg/hr patch 1 Patch  1 Patch TransDERmal DAILY    nitroglycerin (NITROSTAT) tablet 0.4 mg  0.4 mg SubLINGual Q5MIN PRN    morphine (ROXANOL) concentrated oral syringe 5 mg  5 mg Oral Q1H PRN    Or    morphine (ROXANOL) concentrated oral syringe 5 mg  5 mg SubLINGual Q1H PRN    acetaminophen (TYLENOL) tablet 650 mg  650 mg Oral Q4H PRN    haloperidoL (HALDOL) tablet 2 mg  2 mg Oral Q1H PRN    diphenhydrAMINE (BENADRYL) capsule 25 mg  25 mg Oral Q6H PRN    LORazepam (ATIVAN) tablet 0.5 mg  0.5 mg Oral Q6H PRN    senna (SENOKOT) tablet 8.6 mg  1 Tab Oral BID    benzonatate (TESSALON) capsule 200 mg  200 mg Oral Q6H PRN    alum-mag hydroxide-simeth (MYLANTA) oral suspension 30 mL  30 mL Oral QID PRN    albuterol (PROVENTIL VENTOLIN) nebulizer solution 2.5 mg  2.5 mg Nebulization Q4H PRN    hyoscyamine SL (LEVSIN/SL) tablet 0.125 mg  0.125 mg SubLINGual Q4H PRN    LORazepam (ATIVAN) injection 2 mg  2 mg IntraVENous Q10MIN PRN       /4: (Juliette) Admitted to [facility] level of care with hospice diagnosis of 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      1. Pain: Morphine 5mg PO Q6 and q1 hour as needed. Nitroglycerin 0.4mg patch in place.      2. Dyspnea: Morphine 5mg PO Q6 and q1 hour as needed. Duonebs q4 prn. Levsin 0.125mg q4 prn secretions. Oxygen at 2 L/min prn.     3. Agitation: Haloperidol 2mg PO Q1 hour prn and Lorazepam 0.5mg PO q6 hours prn.     4. Family/Pt Support: No family at bedside during exam. Medications and plan of care discussed with nursing staff. Will continue to monitor for symptoms and adjust medications as needed to maintain patient comfort. PPS 30%.  Case discussed with Dr. Yennifer Shirley.     No change in plan of care. Remains appropriate for domiciliary level of care. Patient woke up to eat dinner and is very agitated. Requiring one on one to prevent him from falling due to unsteady gait. Ativan and roxanol give for agitation and pain. Patient assisted back to bed with bed alarm and tab alerts in place.      Signed By: Tiffanie Munson NP     March 8, 2021

## 2021-03-08 NOTE — PROGRESS NOTES
Walking rounds completed with off going RN. Pt identified by name/. Resting peacefully with eyes closed. Respirations non labored. Facial features flat,relaxed. FLACC 0/10. No s/sx of pain,anxiety,nausea or distress. Bed in low and locked position with side rails up x 2 with call light in reach. Door left open as pt is unaccompanied.  Pm assessment completed. Nitro-dur removed as per orders. Watching TV. Safety maintained.  Ativan 1 mg given po for anxiety. Attempting to exit bed. Redirected by staff. Garbled speech. Pulled up in bed. Brief dry. Tab alarm and mattress alarm in place for safety. Bed in low and locked position with side rails up x 2 and call light in reach. Door remains open as pt is unaccompanied.  Resting peacefully with eyes closed. Respirations non labored. FLACC 0/10. No s/sx of pain,anxiety,nausea or distress. Bed in low and locked position with side rails up x 2 with call light in reach.  Benadryl 25 mg given po for itching. No rash observed. Tolerated well. 2325 Resting peacefully with eyes closed. Respirations non labored. FLACC 0/10. No s/sx of itching,pain,anxiety,nausea or distress. Bed in low and locked position with side rails up x 2 with call light in reach. 0100 Scheduled Morphine 5 mg given po as scheduled. Drowsy. Returns quickly to sleep. No s/sx of pain,anxiety,nausea or distress. All safety measures continue. 0330 Continues to rest peacefully with eyes closed. Respirations non labored. FLACC 0/10. No s/sx of pain,anxiety,nausea or distress. Bed in low and locked position with side rails up x 2 with call light in reach. Door left open as pt is unaccompanied. 0550 Scheduled Roxanol administered as ordered. Tolerated well. Vitals taken. Incontinent care provided. FLACC 0/10. Warm blanket provided. Returns to sleep while staff is in attendance. Safety maintained. No s/sx of pain,anxiety,nausea or distress. All care continued. Report to be given to [facility]

## 2021-03-09 NOTE — PROGRESS NOTES
Problem: Falls - Risk of  Goal: *Absence of Falls  Description: Document Gume Elena Fall Risk and appropriate interventions in the flowsheet. Outcome: Progressing Towards Goal  Note: Fall Risk Interventions:       Mentation Interventions: Adequate sleep, hydration, pain control, Bed/chair exit alarm, Door open when patient unattended, Reorient patient, Room close to nurse's station    Medication Interventions: Bed/chair exit alarm    Elimination Interventions: Bed/chair exit alarm, Call light in reach              Problem: Pressure Injury - Risk of  Goal: *Prevention of pressure injury  Description: Document Jose Scale and appropriate interventions in the flowsheet.   Outcome: Progressing Towards Goal  Note: Pressure Injury Interventions:  Sensory Interventions: Assess changes in LOC, Check visual cues for pain, Float heels, Keep linens dry and wrinkle-free, Maintain/enhance activity level, Minimize linen layers, Pad between skin to skin, Pressure redistribution bed/mattress (bed type)    Moisture Interventions: Apply protective barrier, creams and emollients, Maintain skin hydration (lotion/cream), Minimize layers, Moisture barrier    Activity Interventions: Pressure redistribution bed/mattress(bed type)    Mobility Interventions: Float heels, HOB 30 degrees or less, Pressure redistribution bed/mattress (bed type)    Nutrition Interventions: Document food/fluid/supplement intake, Offer support with meals,snacks and hydration    Friction and Shear Interventions: Apply protective barrier, creams and emollients, Feet elevated on foot rest, Minimize layers, Lift sheet, HOB 30 degrees or less                Problem: Pain  Goal: Verbalize satisfaction of level of comfort and symptom control  Outcome: Progressing Towards Goal     Problem: Anxiety/Agitation  Goal: Verbalize and demonstrate ability to manage anxiety  Description: The patient/family/caregiver will verbalize and demonstrate ability to manage the patient's anxiety throughout hospice care.   Outcome: Progressing Towards Goal     Problem: Breathing Pattern - Ineffective  Goal: *PALLIATIVE CARE:  Alleviation of Dyspnea  Outcome: Progressing Towards Goal

## 2021-03-09 NOTE — PROGRESS NOTES
Problem: Falls - Risk of  Goal: *Absence of Falls  Description: Document Akash Márquez Fall Risk and appropriate interventions in the flowsheet. Outcome: Progressing Towards Goal  Note: Fall Risk Interventions:  Mobility Interventions: Bed/chair exit alarm    Mentation Interventions: Adequate sleep, hydration, pain control, Bed/chair exit alarm, Door open when patient unattended, Familiar objects from home, More frequent rounding, Reorient patient, Room close to nurse's station    Medication Interventions: Bed/chair exit alarm    Elimination Interventions: Bed/chair exit alarm, Call light in reach     Patient up in riley chair for several hours. In view of the RN and CNA. Problem: Pressure Injury - Risk of  Goal: *Prevention of pressure injury  Description: Document Jose Scale and appropriate interventions in the flowsheet. Outcome: Progressing Towards Goal  Note: Pressure Injury Interventions:  Sensory Interventions: Assess changes in LOC, Assess need for specialty bed, Float heels, Keep linens dry and wrinkle-free, Minimize linen layers    Moisture Interventions: Absorbent underpads, Minimize layers, Moisture barrier    Activity Interventions: Pressure redistribution bed/mattress(bed type)    Mobility Interventions: Chair cushion, Float heels, HOB 30 degrees or less    Nutrition Interventions: Document food/fluid/supplement intake    Friction and Shear Interventions: Apply protective barrier, creams and emollients, HOB 30 degrees or less, Lift sheet, Minimize layers     Patient was repositioned at least every 2 hours. Genesis area and bottom creamed with barrier cream.   Problem: Pain  Goal: Verbalize satisfaction of level of comfort and symptom control  Outcome: Progressing Towards Goal     Scheduled pain medications given, No prn medications required this shift.    Problem: Anxiety/Agitation  Goal: Verbalize and demonstrate ability to manage anxiety  Description: The patient/family/caregiver will verbalize and demonstrate ability to manage the patient's anxiety throughout hospice care. Outcome: Progressing Towards Goal   No medications required this shift for prn.   Problem: Breathing Pattern - Ineffective  Goal: *PALLIATIVE CARE:  Alleviation of Dyspnea  Outcome: Progressing Towards Goal

## 2021-03-09 NOTE — PROGRESS NOTES
0933-Assumed patient care from Naeem Duke RN. The patient report was taken. Patient identified by name and . Patient is resting quietly in the bed with no signs of distress observed. Pain is at 0/10 using nonverbal scale. No signs of anxiety, SOB or nausea /vomit observed. The patient is alert to self only at this time. The bed is low and locked with two bed rails up and tab alert in place times two. The patient is a fall risk and will be moved to room 116 this AM. Patient changed from incontinent void. 1100- Patient has a daughter and son in to visit. The family was updated on the patient's night and morning. They voiced understanding. Patient was moved to room 116. The family is aware of the move. Patient was turned and rep    74.30.53- The patient took his scheduled Morphine 5 mg. SL with no signs of problems. 1435- Patient was placed in the riley chair and is alert to person only. Changed from wet brief. Two staff to transfer the patient. He is total lift. 1500- Patient continues to rest and shows no signs of distress. Patient was turned and repositioned in the chair with no signs of distress observed. Patient took sips of his tea. He is alert to person only. 18- Patient's daughter, Sean Hu, is in and fed patient his supper. Patient ate 25% and drank 120 ml. Patient does seem to recognize his daughter. Patient was placed back in the bed with total assist of this RN and the CNA. Patient tolerated the move well. Patient remains dry. Tab alert in place. Report off to Guerline Hughes RN.

## 2021-03-09 NOTE — PROGRESS NOTES
0710- Report received, patient resting quietly in bed with no s/sx pain or distress. Call light within reach. Bed is low and locked, tab alert in place, and door left open for continuous monitoring. 7153- patient in bed being fed by cna. meds given crushed in applesauce and nitro patch applied to L chest. No s/sx of pain or distress.

## 2021-03-09 NOTE — PROGRESS NOTES
190:  Patient report from Maco Pate RN. Patient ID by name and . Patient in bed resting with eyes closed. No s/sx of pain, dyspnea, or agitation observed. FLACC 0/10. Bed low and locked and all safety measures in place. Door open. :  Nitro patch removed per orders. Patient noted to be trying to sit up in bed, scratching at arms, and restless. FLACC 8/10. Medicated with PRN Roxanol for pain, PRN Haldol for agitation, and PRN Benadryl for itching. Will reassess. Safety measures maintained. 2227:  Patient setting TAB alert off; trying to lean over bed.      2303:  Patient setting off TAB alerts; throwing blankets off the bed; trying to get out of bed. Brief dry. Medicated with scheduled Roxanol and PRN Lorazepam 0.5 mg PO for agitation. Will reassess. Safety measures remain in place. 0026:  Patient continues trying to get out of bed. Has thrown all covers and bottom pad and chux in the floor. Brief dry. FLACC 8/10. Medicated with PRN Roxanol for pain and PRN Haldol for agitation. Safety measures remain in place. Will reassess. 0105:  Reassessment:  Patient resting calmly at this time. No s/sx of pain or agitation observed. FLACC 0/10.    0400:  Patient resting. No s/sx of pain, dyspnea, or agitation observed. FLACC 0/10.    7290:  Scheduled Roxanol given as ordered. Patient has required PRN Roxanol x 2, PRN Haldol x 2, PRN Benadryl x 1, and PRN Lorazepam x 1 this shift. Report to Constanza Addison RN.

## 2021-03-10 NOTE — PROGRESS NOTES
Problem: Falls - Risk of  Goal: *Absence of Falls  Description: Document Akash Márquez Fall Risk and appropriate interventions in the flowsheet. Outcome: Progressing Towards Goal  Note: Fall Risk Interventions:  Mobility Interventions: Bed/chair exit alarm    Mentation Interventions: Adequate sleep, hydration, pain control, Bed/chair exit alarm, Door open when patient unattended, Familiar objects from home, More frequent rounding, Reorient patient, Room close to nurse's station    Medication Interventions: Bed/chair exit alarm    Elimination Interventions: Bed/chair exit alarm, Call light in reach              Problem: Pressure Injury - Risk of  Goal: *Prevention of pressure injury  Description: Document Jose Scale and appropriate interventions in the flowsheet. Outcome: Progressing Towards Goal  Note: Pressure Injury Interventions:  Sensory Interventions: Assess changes in LOC, Assess need for specialty bed, Float heels, Keep linens dry and wrinkle-free, Minimize linen layers    Moisture Interventions: Absorbent underpads, Minimize layers, Moisture barrier    Activity Interventions: Pressure redistribution bed/mattress(bed type)    Mobility Interventions: Chair cushion, Float heels, HOB 30 degrees or less    Nutrition Interventions: Document food/fluid/supplement intake    Friction and Shear Interventions: Apply protective barrier, creams and emollients, HOB 30 degrees or less, Lift sheet, Minimize layers                Problem: Pain  Goal: Verbalize satisfaction of level of comfort and symptom control  Outcome: Progressing Towards Goal     Problem: Anxiety/Agitation  Goal: Verbalize and demonstrate ability to manage anxiety  Description: The patient/family/caregiver will verbalize and demonstrate ability to manage the patient's anxiety throughout hospice care.   Outcome: Progressing Towards Goal     Problem: Breathing Pattern - Ineffective  Goal: *PALLIATIVE CARE:  Alleviation of Dyspnea  Outcome: Progressing Towards Goal

## 2021-03-10 NOTE — PROGRESS NOTES
1900:  Patient report from Gm Osuna RN. Patient ID by name and . Patient in bed resting with eyes closed. No s/sx of pain, dyspnea, or agitation observed. FLACC 0/10. Bed low and locked and all safety measures in place. Door open. 2100:  Patient continues resting with no s/sx of pain, dyspnea, or agitation observed. FLACC 0/10.    2118:  Nitro patch removed. 2244:  Patient trying to get out of bed; kicking; throwing blankets off. Patient scratching at arms. FLACC 1010. Medicated with PRN Roxanol for pain, PRN Lorazepam 0.5 mg PO for agitation, and PRN Benadryl 25 mg PO for itching. Will reassess. CNA in to bathe patient at this time. 2330:  Reassessment:  Patient resting with no s/sx of itching, agitation, or pain. FLACC 0/10.    2353:  Patient setting off TAB alerts. Taking off gown and trying to get out of bed. Scheduled Roxanol given as ordered and PRN Haldol given for agitation. Will reassess. 0100:  Reassessment:  Patient now resting calmly. No s/sx of agitation observed. 0400:  Patient resting with no s/sx of pain, dyspnea, or agitation observed. FLACC 0/10.    0530:  Scheduled Roxanol given as ordered. Patient has had moments of extreme agitation and has required PRN Roxanol x 1, PRN Lorazepam x 1, PRN Haldol x 1, and PRN Benadryl x 1. Report to Janine Hilario RN.

## 2021-03-10 NOTE — PROGRESS NOTES
7492 Report received from Salvador Catalan RN. 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 Pt required four doses of PRN medications overnight for agitation and combativeness. Currently pt is resting with his eyes closed with an occasional snore. Bed is low and locked, call bell within reach, tab alert in place, side rails up x2, bed alarm is in place. Door remains open for continued monitoring. 0900 Pt continues to rest with his eyes closed. Safety measures are in place. Door remains open for continued monitoring. Scheduled meds held due to patient sleeping. 1145 Pt continues to rest soundly with his eyes closed  And snoring. Safety measures in place. Door remains open for continued monitoring. 200 Pt had visitor at the bedside but pt too sleepy and lethargic to open eyes, visitor left. Pt continues to sleep, snoring. Safety measures in place. 1530 Pt resting in bed with his eyes open. Pt was repositioned in bed and brief was changed. Safety measures are in place. 1615 Pt was given meds that were previously held except for Roxinol which is scheduled for 1800., meds were crushed and given in pudding. 1702 Pt is setting off alarms trying to climb out of bed. Pt removed his brief and urinated in the bed. Pt continues to fight staff to get out of bed. Bed alarm and two tab alerts are on the bed and patient. Roxanol 5mg oral solution given to patient. Safety measures in place. 1709 Pt was medicated with Ativan 0.5mg PO for agitation. Safety measures in place. Pt's son is at the bedside and assisted pt with eating his dinner. He reports pt ate abuot 1/3 or his dinner. [address] going off and son unable to keep patient in the bed. Haldol 2mg po given with sips of water. Safety measures remain in place. Son is at the bedside. 1845 [place] held due to schedule change when pt sleeping today.  Nitro patch removed per new order. Pt is resting with his eyes closed, much less restlessness at this time. Son is at the bedside. Safety measures in place. Report given to Cher Acosta RN.

## 2021-03-11 NOTE — PROGRESS NOTES
9371 Report received from Jazmin Aguilar RN. 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 Pt required four doses of PRN medications overnight for agitation and combativeness. Currently pt is resting with his eyes closed with an occasional snore. Bed is low and locked, call bell within reach, tab alert in place, side rails up x2, bed alarm is in place. Door remains open for continued monitoring. 0800 Pt continues to rest with his eyes closed, no signs or symptoms of pain or distress noted. Safety measures in place. Door remains open for continued monitoring. 1020 Pt resting, CNA changed pt's wet brief and pt did not wake up. AM meds held due to pt sleeping soundly. Safety measures in place. 1200 pt was awaked for lunch. Pt sleepy, did not open his eyes but did eat his lunch when fed by CNA. Pt took meds crushed in whip cream without difficulty. Safety measures in place. 1505 Pt continues to rest with his eyes closed. No signs or symptoms of pain or distress noted. RR even and unlabored. Safety measures in place. Door remains open for continued monitoring. 1720 Pt is sitting up in bed and eating dinner with assistance of CNA. Pt had a wet brief and was changed and repositioned prior to dinner. All safety measures remain in place with bed alarm on and two tab alerts, side rails up x2, bed.    1840 Pt is resting soundly with his eyes closed, snoring. All safety measures are in place. Door remains open for continued monitoring. Report given to Oly Carpenter RN.

## 2021-03-11 NOTE — PROGRESS NOTES
Problem: Falls - Risk of  Goal: *Absence of Falls  Description: Document Shaniqua Yamini Fall Risk and appropriate interventions in the flowsheet. Outcome: Progressing Towards Goal  Note: Fall Risk Interventions:  Mobility Interventions: Bed/chair exit alarm    Mentation Interventions: Adequate sleep, hydration, pain control, Bed/chair exit alarm, Door open when patient unattended, Familiar objects from home, More frequent rounding, Reorient patient, Room close to nurse's station    Medication Interventions: Bed/chair exit alarm    Elimination Interventions: Bed/chair exit alarm, Call light in reach              Problem: Pressure Injury - Risk of  Goal: *Prevention of pressure injury  Description: Document Jose Scale and appropriate interventions in the flowsheet. Outcome: Progressing Towards Goal  Note: Pressure Injury Interventions:  Sensory Interventions: Assess changes in LOC, Assess need for specialty bed, Float heels, Keep linens dry and wrinkle-free, Minimize linen layers    Moisture Interventions: Absorbent underpads, Minimize layers, Moisture barrier    Activity Interventions: Pressure redistribution bed/mattress(bed type)    Mobility Interventions: Chair cushion, Float heels, HOB 30 degrees or less    Nutrition Interventions: Document food/fluid/supplement intake    Friction and Shear Interventions: Apply protective barrier, creams and emollients, HOB 30 degrees or less, Lift sheet, Minimize layers                Problem: Pain  Goal: Verbalize satisfaction of level of comfort and symptom control  Outcome: Progressing Towards Goal     Problem: Anxiety/Agitation  Goal: Verbalize and demonstrate ability to manage anxiety  Description: The patient/family/caregiver will verbalize and demonstrate ability to manage the patient's anxiety throughout hospice care.   Outcome: Progressing Towards Goal     Problem: Breathing Pattern - Ineffective  Goal: *PALLIATIVE CARE:  Alleviation of Dyspnea  Outcome: Progressing Towards Goal

## 2021-03-11 NOTE — PROGRESS NOTES
190:  Patient report from Brianna Jensen RN. Patient ID by name and . Patient in bed resting with eyes closed. No s/sx of pain, dyspnea, or agitation observed. FLACC 0/10. Bed low and locked and all safety measures in place. Son at the bedside. Door open. :  Patient agitated; trying to exit bed; scratching abdomen. FLACC 10/10. Medicated with PRN Roxanol for pain, PRN Haldol for agitation, and PRN Benadryl for itching. Will reassess. Bath in progress. :  Reassessment:  Patient now resting calmly with no s/sx of agitation, pain, or itching. FLACC 0/10.    0007:  Scheduled Roxanol given as ordered. 0200:  Patient resting with no s/sx of pain, dyspnea, or agitation observed. FLACC 0/10.      5415:  Patient agitated and trying to get out of bed; throwing blankets onto floor. FLACC 5/10. Medicated with PRN Roxanol for pain and PRN Lorazepam for agitation. Will reassess. Safety measures remain in place. 0405:  Reassessment:  Patient now resting with no s/sx of pain or agitation. FLACC 0/10.    0503:  Scheduled Roxanol given as ordered. Patient has required PRN Roxanol x 2, PRN Benadryl x 1, PRN Haldol x 1, and PRN Lorazepam x 1 this shift. Report to Brianna Jensen RN.

## 2021-03-11 NOTE — PROGRESS NOTES
Brief follow up. Patient's daughter Gualberto Márquez was in the room.  spoke with her briefly assuring her of continued availability. Patient's eyes were open and he spoke a little. He appeared to be just waking up. Gualberto Márquez said when she came yesterday he was asleep so she changed her visit time in hopes to catch him awake.  offered assurance of prayer.

## 2021-03-12 NOTE — PROGRESS NOTES
0218-PRN Haldol 2mg PO crushed and given in water via syringe. Patient pulling brief off  And throwing covers off. Patient unable to voice needs right now but remains alert. Quiet environment and dimming lights in effect. FLACC 2/10. Primary nurse to reassess.

## 2021-03-12 NOTE — [facility] IDG MASTER NOTE
[facility]  Date: 03/12/21  Time: 2:30 PM    ___________________    Patient: Liseth Leo  Coverage Information:     Payor: [place] [employer]     Plan: [place] MEDICARE PART A AND B     Subscriber ID: [id number]     Phone Number:   MRN: [id number]    Current Benefit Period: Benefit Period 1  Start Date: 3/4/2021  End Date: 6/1/2021        Hospice Attending Provider: Emery Parekh [address] [place] [address]  [id number]  Phone: [phone]  Fax: [phone]    Level of Care: Routine Home Care      ___________________    Diagnoses: The primary encounter diagnosis was Pain of left thigh. 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     Current Medications:    Current Facility-Administered Medications:     morphine (ROXANOL) concentrated oral syringe 5 mg, 5 mg, Oral, Q6H, Heaven Mon NP, 5 mg at 03/12/21 1253    tamsulosin (FLOMAX) capsule 0.4 mg, 0.4 mg, Oral, DAILY, Emery Parekh MD, 0.4 mg at 03/12/21 1013    nitroglycerin (NITROSTAT) tablet 0.4 mg, 0.4 mg, SubLINGual, Q5MIN PRN, Emery Parekh MD    morphine (ROXANOL) concentrated oral syringe 5 mg, 5 mg, Oral, Q1H PRN, 5 mg at 03/11/21 0317 **OR** morphine (ROXANOL) concentrated oral syringe 5 mg, 5 mg, SubLINGual, Q1H PRN, Emery Parekh MD, 5 mg at 03/11/21 0306    acetaminophen (TYLENOL) tablet 650 mg, 650 mg, Oral, Q4H PRN, Emery Parekh MD    haloperidoL (HALDOL) tablet 2 mg, 2 mg, Oral, Q1H PRN, Emery Parekh MD, 2 mg at 03/12/21 1013    diphenhydrAMINE (BENADRYL) capsule 25 mg, 25 mg, Oral, Q6H PRN, Emery Parekh MD, 25 mg at 03/10/21 2106    LORazepam (ATIVAN) tablet 0.5 mg, 0.5 mg, Oral, Q6H PRN, Reinaldo Gallardo MD, 0.5 mg at 03/11/21 2315    senna (SENOKOT) tablet 8.6 mg, 1 Tab, Oral, BID, Reinaldo Gallardo MD, 8.6 mg at 03/12/21 1013    benzonatate (TESSALON) capsule 200 mg, 200 mg, Oral, Q6H PRN, Reinaldo Gallardo MD    alum-mag hydroxide-simeth (MYLANTA) oral suspension 30 mL, 30 mL, Oral, QID PRN, Reinaldo Gallardo MD    albuterol (PROVENTIL VENTOLIN) nebulizer solution 2.5 mg, 2.5 mg, Nebulization, Q4H PRN, Reinaldo Gallardo MD    hyoscyamine SL (LEVSIN/SL) tablet 0.125 mg, 0.125 mg, SubLINGual, Q4H PRN, Reinaldo Gallardo MD    LORazepam (ATIVAN) injection 2 mg, 2 mg, IntraVENous, Q10MIN PRN, Reinaldo Gallardo MD    Orders:  Orders Placed This Encounter    IP CONSULT TO SPIRITUAL CARE     Standing Status:   Standing     Number of Occurrences:   1     Order Specific Question:   Reason for Consult: Answer: Once on week one, then PRN. For [facility] Patients Only. For contracted patients, their primary hospice will continue to manage spiritual care needs.  DIET REGULAR     Standing Status:   Standing     Number of Occurrences:   1    VITAL SIGNS     Standing Status:   Standing     Number of Occurrences:   1    VITAL SIGNS     Standing Status:   Standing     Number of Occurrences:   [id number]    NURSING-MISCELLANEOUS: Comfort Care Measures CONTINUOUS     Standing Status:   Standing     Number of Occurrences:   1     Order Specific Question:   Description of Order:     Answer:   Comfort Care Measures    BLADDER CHECKS     May scan bladder PRN for urinary retention and or patient discomfort     Standing Status:   Standing     Number of Occurrences:   [id number]    NURSING ASSESSMENT:  SPECIFY Assess for GIP, routine, or respite level of care. Q SHIFT Routine     Standing Status:   Standing     Number of Occurrences:   1     Order Specific Question:   Please describe the test or procedure you would like to order.      Answer:   Assess for GIP, routine, or respite level of care.  PAIN ASSESSMENT Pain and Symptoms: Assess ever 4 hours and PRN, for GIP level of care. PRN Routine     Standing Status:   Standing     Number of Occurrences:   [id number]     Order Specific Question:   Please describe the test or procedure you would like to order. Answer:   Pain and Symptoms: Assess ever 4 hours and PRN, for GIP level of care.  WOUND CARE, DRESSING CHANGE     Wound Care:  Location: sacrum/coccyx  Stage I ([place])- Cleanse wound location with wound cleanser, pat dry and apply Cavilon Durable Barrier Cream every 12 hours and PRN if soiled. Turn every 2 hours. Assess with each nursing assessment visit. Standing Status:   Standing     Number of Occurrences:   30    WOUND CARE, DRESSING CHANGE     Wound Care:   Location: left heel   Stage I ([place])- Cleanse wound location with wound cleanser, pat dry and apply Cavilon Durable Barrier Cream every 12 hours and PRN if soiled. Turn every 2 hours. Assess with each nursing assessment visit. Standing Status:   Standing     Number of Occurrences:   30    WOUND CARE, DRESSING CHANGE     Wound Care:   Location: right heel   Stage I ([place])- Cleanse wound location with wound cleanser, pat dry and apply Cavilon Durable Barrier Cream every 12 hours and PRN if soiled. Turn every 2 hours. Assess with each nursing assessment visit. Standing Status:   Standing     Number of Occurrences:   30    ACTIVITY AS TOLERATED W/ASSIST     May use bedside commode     Standing Status:   Standing     Number of Occurrences:   1    NURSING-MISCELLANEOUS: admit 3/4: (Juliette) Admitted to domiciliary level of care. 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 Hospice Dx: Widely metastatic cancer of undetermined cell type and site . .. 3/4: (Juliette) Admitted to domiciliary level of care. 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 Associated Dx:  Extensive bilateral pulmonary metastases, respiratory failure with hypoxia, and metabolic encephalopathy. This is in order to admit Deb Darrenkenton Espinoza to routine level hospice care under domiciliary status at [address] [place] [address], for first 90-day benefit interval. 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  The patient has Alzheimer's disease of moderate severity without behavioral disturbance.  Staff at his halfway found him to be more confused than usual with an O2 saturation less than 90%.  After a brief hospital evaluation his daughter and durable med POA, Shelby Solitario, elected to decline diagnostic biopsy because it was clear that she would not subject her father to disease modifying interventions. 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 The dramatic extent of this octogenarian's disease at presentation and its apparent rapid progression limit his expected survival to less than 1 month. Leslye Alvarado will require introduction and titration of scheduled and intermittent opioid to control somatic pain and dyspnea.  Although he cannot recall it and reported, the patient is observed to have FL AC evidence of pain with movement.  Grief remains a diagnosis contributing to his poor prognosis in spite of his memory deficits. Non Associated Dx:  DVT, CHF, Alzheimer's disease, and HTN. Standing Status:   Standing     Number of Occurrences:   1     Order Specific Question:   Description of Order:     Answer:   admit    DO NOT RESUSCITATE     Standing Status:   Standing     Number of Occurrences:   1     Order Specific Question:   Comfort Measures Only? Answer:    Yes    OXYGEN CANNULA Liters per minute: 2; Indications for O2 therapy: RESPIRATORY DISTRESS CONTINUOUS Routine     Standing Status:   Standing     Number of Occurrences:   1     Order Specific Question:   Liters per minute: Answer:   2     Order Specific Question:   Indications for O2 therapy     Answer:   RESPIRATORY DISTRESS    atenoloL (TENORMIN) 25 mg tablet     Sig: Take 25 mg by mouth daily.  DISCONTD: melatonin 5 mg tablet     Sig: Take 5 mg by mouth nightly.  DISCONTD: atenoloL (TENORMIN) tablet 25 mg (Patient Supplied)     OP SIG:Take 25 mg by mouth daily.  tamsulosin (FLOMAX) capsule 0.4 mg     OP SIG:Take 0.4 mg by mouth daily.  DISCONTD: nitroglycerin (NITRODUR) 0.4 mg/hr patch 1 Patch     OP SI Patch by TransDERmal route daily.  nitroglycerin (NITROSTAT) tablet 0.4 mg     OP SI Tab by SubLINGual route every five (5) minutes as needed for Chest Pain.  OR Linked Order Group     morphine (ROXANOL) concentrated oral syringe 5 mg     morphine (ROXANOL) concentrated oral syringe 5 mg    acetaminophen (TYLENOL) tablet 650 mg    haloperidoL (HALDOL) tablet 2 mg    diphenhydrAMINE (BENADRYL) capsule 25 mg    DISCONTD: acetaminophen (TYLENOL) tablet 650 mg    LORazepam (ATIVAN) tablet 0.5 mg    senna (SENOKOT) tablet 8.6 mg    benzonatate (TESSALON) capsule 200 mg    DISCONTD: haloperidoL (HALDOL) tablet 2 mg    alum-mag hydroxide-simeth (MYLANTA) oral suspension 30 mL    albuterol (PROVENTIL VENTOLIN) nebulizer solution 2.5 mg     Order Specific Question:   MODE OF DELIVERY     Answer:   Nebulizer    hyoscyamine SL (LEVSIN/SL) tablet 0.125 mg    LORazepam (ATIVAN) injection 2 mg    DISCONTD: morphine (ROXANOL) concentrated oral syringe 10 mg    morphine (ROXANOL) concentrated oral syringe 5 mg    escitalopram oxalate (Lexapro) 5 mg tablet     Sig: Take 5 mg by mouth daily.  lisinopril-hydroCHLOROthiazide (Zestoretic) 20-12.5 mg per tablet     Sig: Take 2 Tabs by mouth daily.     memantine ER (NAMENDA XR) 28 mg capsule     Sig: Take 28 mg by mouth daily.  INITIAL PHYSICIAN ORDER: HOSPICE Level Of Care: Routine; Reason for Admission: new onset widely extensive bilateral pulmonary metastatic cncer of unknown primary, dyspnea, incident chest/back pain, metabbolic encephalopathy     Standing Status:   Standing     Number of Occurrences:   1     Order Specific Question:   Status     Answer:   Hospice     Order Specific Question:   Level Of Care     Answer:   Routine     Order Specific Question:   Reason for Admission     Answer:   new onset widely extensive bilateral pulmonary metastatic cncer of unknown primary, dyspnea, incident chest/back pain, metabbolic encephalopathy     Order Specific Question:   Inpatient Hospitalization Certified Necessary for the Following Reasons     Answer:   9.  Other (further clarification in H&P documentation)     Order Specific Question:   Admitting Diagnosis     Answer:   Metastasis to lung of unknown origin, unspecified laterality [facility]) [[id number]]     Order Specific Question:   Terminal Prognosis Diagnosis(es)     Answer:   Respiratory failure with hypoxia [facility]) [[id number]]     Order Specific Question:   Terminal Prognosis Diagnosis(es)     Answer:   Cancer associated pain [[id number]]     Order Specific Question:   Terminal Prognosis Diagnosis(es)     Answer:   Dyspnea and respiratory abnormalities [[id number]]     Order Specific Question:   Terminal Prognosis Diagnosis(es)     Answer:   Cough in adult patient [[id number]]     Order Specific Question:   Terminal Prognosis Diagnosis(es)     Answer:   Fatigue [[id number]]     Order Specific Question:   Terminal Prognosis Diagnosis(es)     Answer:   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 [facility]) [[id number]]     Order Specific Question:   Terminal Prognosis Diagnosis(es)     Answer:   BPH (benign prostatic hyperplasia) [[id number]]     Order Specific Question:   Terminal Prognosis Diagnosis(es) Answer:   DVT femoral (deep venous thrombosis) with thrombophlebitis, left [facility]) [[id number]]     Order Specific Question:   Terminal Prognosis Diagnosis(es)     Answer:   Alzheimer's dementia without behavioral disturbance [facility]) [[id number]]     Order Specific Question:   Terminal Prognosis Diagnosis(es)     Answer:   O[facility] [[id number]]     Order Specific Question:   Admitting Physician     Answer:   Junious Chick     Order Specific Question:   Attending Physician     Answer:   Junious Chick     Order Specific Question:   Discharge Plan:     Answer:   Home with Home Hospice    IP CONSULT TO SOCIAL WORK     Standing Status:   Standing     Number of Occurrences:   1     Order Specific Question:   Reason for Consult: Answer: For [facility] Only. For contracted patients, their primary hospice will continue to manage social work needs. Allergies:  No Known Allergies    Care Plan:  Multidisciplinary Problems (Active)     Problem: Anticipatory Grief     Dates: Start: 03/05/21       Disciplines: Interdisciplinary    Goal: Grief heard and acknowledged, anxiety reduced, patient coping identified, patient/family expressed gratitude     Dates: Start: 03/05/21   Expected End: 03/19/21       Description: Patient / Family will acknowledge feelings  of grief and anxiety and utilize available support to help them cope throughout their grief journey. Disciplines: Interdisciplinary    Intervention: Assess grief responses     Dates: Start: 03/05/21       Description: Melinda Witt will assess for grief reactions with each visit. Intervention: Support grieving process     Dates: Start: 03/05/21       Description: Melinda Witt will provide support by providing a safe space for nonjudgmental communication and grief education.                        Problem: Anticipatory Grief     Dates: Start: 03/05/21       Disciplines: Interdisciplinary    Goal: Explore Reactions To and Verbalize Acceptance of Impending Loss     Dates: Start: 03/05/21   Expected End: 04/16/21       Description: Patient/family/caregiver will explore reactions to and verbalize acceptance of impending loss. Disciplines: Interdisciplinary    Intervention: Assess grief responses     Dates: Start: 03/05/21       Description: Assess for feelings of grief          Intervention: Instruct on grief process and coping strategies     Dates: Start: 03/05/21       Description: Instruct patient/caregiver on the grief process and effective coping strategies            Intervention: Provide grief counseling/education     Dates: Start: 03/05/21       Description: Provide patient/caregiver grief counseling and educate on community resources          Intervention: Refer to grief support in community     Dates: Start: 03/05/21       Description: Refer patient/caregiver to community resources for grief support                      Problem: Anticipatory Grief     Dates: Start: 03/12/21       Disciplines: Interdisciplinary    Goal: Grief heard and acknowledged, anxiety reduced, patient coping identified, patient/family expressed gratitude     Dates: Start: 03/12/21       Disciplines: Interdisciplinary    Intervention: Assess grief responses     Dates: Start: 03/12/21       Description: Assess for feelings of grief          Intervention: Support grieving process     Dates: Start: 03/12/21       Description: Address feelings of loss, anticipatory grief, expression of concern. Problem: Anxiety/Agitation     Dates: Start: 03/04/21       Disciplines: Interdisciplinary    Goal: Verbalize and demonstrate ability to manage anxiety     Dates: Start: 03/04/21   Expected End: 03/13/21       Description: The patient/family/caregiver will verbalize and demonstrate ability to manage the patient's anxiety throughout hospice care.     Disciplines: Interdisciplinary    Intervention: Assess for anxiety/agitation     Dates: Start: 03/04/21 Description: Assess for signs and symptoms of anxiety and agitation. Intervention: Instruction strategies to reduce anxiety/agitation     Dates: Start: 03/04/21       Description: Instruct patient/caregiver on strategies to reduce anxiety/agitation. Problem: Breathing Pattern - Ineffective     Dates: Start: 03/04/21       Disciplines: Nurse, Interdisciplinary, RT    Goal: *PALLIATIVE CARE:  Alleviation of Dyspnea     Dates: Start: 03/04/21   Expected End: 03/13/21       Disciplines: Nurse, Interdisciplinary, RT    Intervention: Refer patient for holistic services per facility     Dates: Start: 03/04/21                         Problem: Coping and Emotional Distress     Dates: Start: 03/05/21       Disciplines: Interdisciplinary    Goal: Demonstrate Acceptance of Terminal Illness and Disease Progression     Dates: Start: 03/05/21   Expected End: 04/16/21       Description: Patient/family/caregiver will demonstrate acceptance of terminal disease and understanding of disease progression while employing appropriate mechanisms. Disciplines: Interdisciplinary    Intervention: Assess emotional status and coping mechanisms     Dates: Start: 03/05/21       Description: Assess patient/caregiver emotional status and coping mechanisms            Intervention: Assess family's level of coping     Dates: Start: 03/05/21             Intervention: Instruct on effective coping strategies     Dates: Start: 03/05/21       Description: Patient/family/caregiver will demonstrate acceptance of terminal disease and understanding of disease progression while employing coping mechanisms                      Problem: End of Life Process     Dates: Start: 03/04/21       Disciplines: Interdisciplinary    Goal: Demonstrate understanding of end of life processes     Dates: Start: 03/04/21   Expected End: 03/13/21       Description: Patient/caregiver will understand end of life processes.     Disciplines: Interdisciplinary    Intervention: Assess for signs/symptoms of terminal restlessness     Dates: Start: 03/04/21             Intervention: Holli Rodriguez on strategies to reduce terminal restlessness     Dates: Start: 03/04/21             Intervention: Instruct on the dying process     Dates: Start: 03/04/21             Intervention: Instruct: imminent death     Dates: Start: 03/04/21             Intervention: Instruct: process at end of life     Dates: Start: 03/04/21                         Problem: Falls - Risk of     Dates: Start: 03/04/21       Description: Pt will remain free from falls aeb no injuries while at [facility]. Disciplines: Interdisciplinary    Goal: *Absence of Falls     Dates: Start: 03/04/21   Expected End: 03/13/21       Description: Document [place] Flow Fall Risk and appropriate interventions in the flowsheet. Disciplines: Interdisciplinary                Problem: Impaired Family Dynamics     Dates: Start: 03/05/21       Disciplines: Interdisciplinary    Goal: Demonstrate Normal or Improving Interpersonal Relationships     Dates: Start: 03/05/21   Expected End: 03/20/21       Description: Patient/family/caregiver will demonstrate normal or improving interpersonal relationships. Disciplines: Interdisciplinary    Intervention: Assess family/friend/caregiver dynamics     Dates: Start: 03/05/21       Description: Assess pattern of relating and interactions between family members, friends, and caregivers.           Intervention: Collaborate with  regarding alterations in family dynamics     Dates: Start: 03/05/21       Description: Collaborate with  for alterations identified in family dynamics                Goal: Demonstrate normal or improving family dynamics with regards to patient's terminal illness     Dates: Start: 03/05/21   Expected End: 03/20/21       Description: Demonstrate normal or improving family dynamics with regards to patient's terminal illness    Disciplines: Interdisciplinary    Intervention: Assess family dynamics     Dates: Start: 03/05/21       Description: Assess family pattern of relating and interactions between family members          Intervention: Collaborate with  regarding alterations in family dynamics     Dates: Start: 03/05/21       Description: Collaborate with  for alterations identified in family dynamics                      Problem: Pain     Dates: Start: 03/04/21       Description: Pt will remain free from pain aeb pain score or flacc less than 4 while at [facility]. Disciplines: Interdisciplinary    Goal: Verbalize satisfaction of level of comfort and symptom control     Dates: Start: 03/04/21   Expected End: 03/13/21       Disciplines: Interdisciplinary    Intervention: Assess effectiveness of pain management     Dates: Start: 03/04/21             Intervention: Assess for signs/symptoms of acute pain     Dates: Start: 03/04/21             Intervention: Assess for signs/symptoms of chronic pain     Dates: Start: 03/04/21             Intervention: Instruct on non-pharmacological pain management     Dates: Start: 03/04/21             Intervention: Instruct on pain scales     Dates: Start: 03/04/21             Intervention: Instruct on pharmacological pain management     Dates: Start: 03/04/21                         Problem: Patient/Family/Caregiver Has Conflict     Dates: Start: 03/05/21       Disciplines: Interdisciplinary    Goal: Verbalize feelings on relationships, problems, and/or fears     Dates: Start: 03/05/21       Description: The patient will verbalize feelings on relationships, problems and/or fears. Disciplines: Interdisciplinary    Intervention: Encourage verbalization of feelings, perceptions, fears, stressors     Dates: Start: 03/05/21       Description: Encourage patient/caregiver to verbalize feelings, perceptions, fears, and stressors.           Intervention: Include family/caregiver in decisions related to psychosocial needs     Dates: Start: 03/05/21       Description: Include family/caregiver in decisions related to healthcare decisions, managing illness, coping mechanisms, financial assistance, and transportation needs                Goal: Verbalize understanding of physical changes and the reality of dying process     Dates: Start: 03/05/21       Description: The patient/family/caregiver will verbalize understanding of physical changes and the reality of the dying process. Disciplines: Interdisciplinary    Intervention: Assess impact of loss upon self and others     Dates: Start: 03/05/21       Description: Assess impact of loss upon the patient/caregiver          Intervention: Encourage verbalization of feelings, perceptions, fears, stressors     Dates: Start: 03/05/21       Description: Encourage patient/caregiver to verbalize feelings, perceptions, fears, and stressors. Intervention: Include family/caregiver in decisions related to psychosocial needs     Dates: Start: 03/05/21       Description: Include family/caregiver in decisions related to healthcare decisions, managing illness, coping mechanisms, financial assistance, and transportation needs                      Problem: Pressure Injury - Risk of     Dates: Start: 03/04/21       Description: Pt will remain free from/ any further pressure injuries aeb no/progression pressure sores while at [facility]. Disciplines: Interdisciplinary    Goal: *Prevention of pressure injury     Dates: Start: 03/04/21   Expected End: 03/13/21       Description: Document Jose Scale and appropriate interventions in the flowsheet.     Disciplines: Interdisciplinary                Problem: Psychosocial General     Dates: Start: 03/05/21       Disciplines: Interdisciplinary    Goal: Verbalize factors contributing to need for treatment and voice changes that will be made to prevent hospitalization Dates: Start: 03/05/21       Disciplines: Interdisciplinary    Intervention: Identify psychosocial factors     Dates: Start: 03/05/21       Description: On SOC and PRN encourage patient/caregiver to identify factors in the patient's that have contributed to the need for treatment (e.g. Living situation, relationships, drug or alcohol use, inadequate coping mechanisms). Discuss each contributing factor, how the patient sees these now, and what can be changed, what the patient is motivated to change, and how the patient will deal differently with these things to prevent rehospitalization.                         Care Plan Problems/Goals      Progressing Towards Goal (8)      *Absence of Falls (Falls - Risk of)    Disciplines:  Interdisciplinary Expected end:  03/13/21        Outcome: Progressing Towards Goal By [place] Particle on 03/12/21 2834            *Prevention of pressure injury (Pressure Injury - Risk of)    Disciplines:  Interdisciplinary Expected end:  03/13/21        Outcome: Progressing Towards Goal By [place] Particle on 03/12/21 0077            Verbalize satisfaction of level of comfort and symptom control (Pain)    Disciplines:  Interdisciplinary Expected end:  03/13/21        Outcome: Progressing Towards Goal By Raul Primes on 03/12/21 2801            Verbalize and demonstrate ability to manage anxiety (Anxiety/Agitation)    Disciplines:  Interdisciplinary Expected end:  03/13/21        Outcome: Progressing Towards Goal By [place] Particle on 03/12/21 4296            Demonstrate understanding of end of life processes (End of Life Process)    Disciplines:  Interdisciplinary Expected end:  03/13/21        Outcome: Progressing Towards Goal By [place] Particle on 03/12/21 5940            *PALLIATIVE CARE:  Alleviation of Dyspnea (Breathing Pattern - Ineffective)    Disciplines:  Nurse, Interdisciplinary, RT Expected end:  03/13/21        Outcome: Progressing Towards Goal By [place] Particle on 03/12/21 3042 Grief heard and acknowledged, anxiety reduced, patient coping identified, patient/family expressed gratitude (Anticipatory Grief)    Disciplines:  Interdisciplinary Expected end:  03/19/21        Outcome: Progressing Towards Goal By Tate Clark on 03/12/21 9528            Explore Reactions To and Verbalize Acceptance of Impending Loss (Anticipatory Grief)    Disciplines:  Interdisciplinary Expected end:  04/16/21        Outcome: Progressing Towards Goal By Dixon Ho RN on 03/09/21 1813                         No Outcome (7)      Demonstrate Acceptance of Terminal Illness and Disease Progression (Coping and Emotional Distress)    Disciplines:  Interdisciplinary Expected end:  04/16/21          Demonstrate Normal or Improving Interpersonal Relationships (Impaired Family Dynamics)    Disciplines:  Interdisciplinary Expected end:  03/20/21          Demonstrate normal or improving family dynamics with regards to patient's terminal illness (Impaired Family Dynamics)    Disciplines:  Interdisciplinary Expected end:  03/20/21          Verbalize feelings on relationships, problems, and/or fears (Patient/Family/Caregiver Has Conflict)    Disciplines:  Interdisciplinary Expected end:  -          Verbalize understanding of physical changes and the reality of dying process (Patient/Family/Caregiver Has Conflict)    Disciplines:  Interdisciplinary Expected end:  -          Verbalize factors contributing to need for treatment and voice changes that will be made to prevent hospitalization (Psychosocial General)    Disciplines:  Interdisciplinary Expected end:  -          Grief heard and acknowledged, anxiety reduced, patient coping identified, patient/family expressed gratitude (Anticipatory Grief)    Disciplines:  Interdisciplinary Expected end:  -                       Resolved/Met (3)      Patient/Family Education (Patient Education: Go to Patient Education Activity)    Disciplines:  Interdisciplinary Expected end:  -        Outcome: Resolved/Met By Washington Donohue RN on 03/07/21 2827            Patient/Family Education (Patient Education: Go to Patient Education Activity)    Disciplines:  Interdisciplinary Expected end:  -        Outcome: Resolved/Met By Washington Donohue RN on 03/07/21 2075            Demonstrate understanding of hospice philosophy, plan of care, and home hospice program [facility] Orientation)    Disciplines:  Interdisciplinary Expected end:  03/13/21        Outcome: Resolved/Met By Washington Donohue RN on 03/07/21 0749                              ___________________    Care Team Notes          POC/IDG Notes      [facility] IDG Nurse Notes by Stephanie De La Cruz RN at 03/12/21 1430  Version 1 of 1    Author: Stephanie De La Cruz RN Service: NURSING Author Type: Registered Nurse    Filed: 03/12/21 1430 Date of Service: 03/12/21 1430 Status: Signed    : Stephanie De La Cruz RN (Registered Nurse)       Patient: John Timmons    Date: 03/12/21  Time: 2:30 PM    [address] [place] [address] Nurse Notes  Follow UP IDG: Pt is a 81year old male with 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 Incontinent and voiding x 5    IV access: None   PO intake: Eating well  Oxygen: Room Air  Wounds: Stage I sacral / Bilateral heel stage I  PRN medications: Haldol 2mg x 2 doses for agitation / Roxinol 5mg x1 dose for pain / Ativan 0.5mg x 1 dose for anxiety   Scheduled meds:  Roxinol 5mg q 6 hours / Senokot 8.6mg BID / Flomax 0.4mg QD  Plan: Comprehensive plan of care reviewed. IDG and pt./family in agreement with plan of care. The IDG identifies through on-going assessment when a change is needed to the POC; the pt/family will receive care and services necessitated by changes in POC. Medications reviewed by the pharmacist and Medical Director.       Signed by: Myles Hutchinson RN       [facility] IDG  Notes by Sapphire Vasquez at 03/12/21 1236  Version 1 of 1 Author: Elier Alcaraz Service: Spiritual Care Author Type: Pastoral Care    Filed: 03/12/21 1249 Date of Service: 03/12/21 1236 Status: Signed    : Elier Alcaraz (Pastoral Care)       Patient: Judd Miranda    Date: 03/12/21  Time: 12:36 PM    [facility]  Notes  Intervention: Initial assessments for spiritual and bereavement care completed. Care Plan Initiated. Ministry of presence, prayer and family support. Outcome:  Patient pleasant. Family grieving appropriately the passing of MsDiane KaurAbbi Jan 8. Referral to Bereavement. Plan: Continue to provide spiritual and bereavement. Signed by: Edwin Sahu       [facility] IDG  Notes by Judy Parekh LMSW at 03/12/21 1226  Version 1 of 1    Author: Judy Parekh LMSW Service: Licensed Clinical  Author Type: Licensed Masters in Social Work    Filed: 03/12/21 1227 Date of Service: 03/12/21 1226 Status: Signed    : Judy Parekh LMSW (Licensed Masters in Social Work)       No change in current care plan. SW to continue to follow for needs and support. [facility] IDG  Notes by Fredrick Duran LMSW at 03/08/21 1122  Version 1 of 1    Author: Fredrick Duran LMSW Service: Licensed Clinical  Author Type:     Filed: 03/08/21 1122 Date of Service: 03/08/21 1122 Status: Signed    : Fredrick Duran LMSW ()       SW to assess coping and needs each visit and offer availability.        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 Voiding x 2   IV access: None   PO intake: Eating bites and liquids with a syringe  Oxygen:2/L  Wounds: Stage I Sacrum / Coccyx, Bilateral Heels Stage I  PRN medications: None   Scheduled meds:  Tenormin 25mg QD / Roxanol 10mg Q8 / Nitroderm 0.4mg patch / Senokot 8.6mg BID / Flomax 0.4mg QD  Plan: Comprehensive plan of care reviewed. IDG and pt./family in agreement with plan of care. The IDG identifies through on-going assessment when a change is needed to the POC; the pt/family will receive care and services necessitated by changes in POC. Medications reviewed by the pharmacist and Medical Director. Signed by: Carol Bocanegra RN       [address] [place] [address]  Notes by Jacquelyn Lira at 03/05/21 0935  Version 1 of 1    Author: Jacquelyn Lira Service: Spiritual Care Author Type: Pastoral Care    Filed: 03/05/21 1236 Date of Service: 03/05/21 0935 Status: Signed    : Jacquelyn Lira (Pastoral Care)       Patient: Helena Hernandez    Date: 03/05/21  Time: 9:35 AM    \[facility]\""  Notes    Assessment pending for spiritual and bereavement support. Signed by: Avtar Cisse       [address] [place] [address]  Notes by Alverto Martinez LMSW at 03/05/21 1045  Version 1 of 1    Author: Alverto Martinez LMSW Service: Licensed Clinical  Author Type:     Filed: 03/05/21 1045 Date of Service: 03/05/21 1045 Status: Signed    : Alverto Martinez LMSW ()       SW to assess coping and needs each visit and offer availability.        [address] [place] [address]  Notes by Hu Alfonso LMSW at 03/05/21 1003  Version 1 of 1    Author: Hu Alfonso LMSW Service: Licensed Clinical  Author Type: Licensed Masters in Social Work    Filed: 03/05/21 1004 Date of Service: 03/05/21 1003 Status: Signed    : uH Alfonso LMSW (Licensed Masters in Social Work)       Negin Herrera has reviewed the initial  Comprehensive RN assessment and POC and agrees       [employer]  Notes by Marge Simons at 03/05/21 0845  Version 1 of 1    Author: Marge Simons Service: Spiritual Care Author Type: Pastoral Care    Filed: 03/05/21 0846 Date of Service: 03/05/21 0845 Status: Signed    : aMrge Simons (Pastoral Care)       Patient: Anahy Gary    Date: 03/05/21  Time: 8:45 AM    [facility]  Notes  / Grief Counselor has reviewed  Initial Comprehensive Assessment and plan of care. Bereavement and Spiritual Care Assessments to be completed and plan of care put in place to meet the needs, requests and referrals. Signed by: Maria CHIANGR [facility] IDG Nurse Notes by Rosalinda Horton RN at 03/04/21 1740  Version 1 of 1    Author: Rosalinda Horton RN Service: NURSING Author Type: Registered Nurse    Filed: 03/04/21 1740 Date of Service: 03/04/21 1740 Status: Signed    : Rosalinda Horton RN (Registered Nurse)       Patient: Anahy Gary    Date: 03/04/21  Time: 5:40 PM    [facility] Nurse Notes      Mr Kitty Cisneros is an 81year old male admitted to room 111 with a hospice diagnosis of metastatic cancer of unknown origin with associated diagnosis of respiratory failure and metabolic encephalopathy. His level of care is domiciliary. Pt is alert but confused not able to state her name or date of birth. Pt arrived incontinent of urine, urine when nurse with assistant attempted to do incontinent care pt hollered, cursed and resisted attempts to turn him. Physical assessment completed. Lung sounds clear, oxygen on at 2 liters per nasal cannula, heart sounds slow with irregular heart rate, abdomen soft with active bowel sounds, pt incontinent of clear yellow urine. Extremities cool with palpable pulses. Pt has stage 1 to sacrum and bilateral heels. Did incontinent care, applied brief and elevated extremities on pillow.  Applied tab alerts x 2 and bed alarm under pt.        Signed by: Ricci Tijerina RN                Care Team Present:   Team Members Present: Physician, Nurse, , , Other (Comment)  Physician Team Member: Dr. Amna Gonzalez  Nurse Team Member: Geronimo Rapp  Social Work Team Member: [address] Team Member: Sundeep [place]  Other Discipline Present (Name):  Makayla Conner NP

## 2021-03-12 NOTE — PROGRESS NOTES
7973- The patient report taken from [facility], [place] operated by [facility], RN and walking rounds completed. The patient was identified by name and . 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 The patient is resting quietly in the bed with no sign of distress. Pain is at 0/10. No signs of anxiety,SOB, nausea / vomit or other distress observed. Patient's room is across from the nursing station and the bed is low and locked with two bed rails up and the tab alert in place. The door to the patient's room is left open for close observation. 46- The patient is awake and alert to person only. Patient was assisted to the riley chair after the patient bathed him. Wet brief changed. He was total lift and did not assist with the transfer. The chair is facing the nurses station so this RN can keep an eye on him. The tab alert is in place. 9037- The patient ate several bites of his egg and took sips of his juice. He is alert to person only. Mouth care completed and patient reclined in the chair. 1015- Patient is banging on the tray of the chair and is agitated. Cannot be calmed with talking to him. Haldol 2 mg administered in applesauce. Patient took it with no problems. Pillows placed to each side to the patient to keep him positioned in the bed. 1045- The patient is now resting quietly in the chair. His son, Anders Lesch, came to visit and he took the patient's soiled and dirty clothes home. Sherren Dolores states that he is glad to see the patient up in the chair. 1253- The patient slept through his lunch and is now awake. He took in sips of iced tea and bites of yogurt. Roxanol 5 mg administered po. Patient was repositioned in the chair. 1500- Patient was placed back in the bed with assist of two RN's. Patient did stand for a few seconds but was mostly a total lift. 1630 -Vital signs done and patient repositioned for comfort. Patient is awake and alert to person only.  Patient refused his supper meal. Few sips of iced tea taken. Mouth care done . Brief changed.      Reported off to Shon Gaytan RN

## 2021-03-12 NOTE — [facility] IDG CHAPLAIN NOTES
Patient: Moira Valera    Date: 03/12/21  Time: 12:36 PM    [facility]  Notes  Intervention: Initial assessments for spiritual and bereavement care completed. Care Plan Initiated. Ministry of presence, prayer and family support. Outcome:  Patient pleasant. Family grieving appropriately the passing of Ms. Abbi Jan 8. Referral to Bereavement. Plan: Continue to provide spiritual and bereavement.          Signed by: Alonso Cobb

## 2021-03-12 NOTE — PROGRESS NOTES
1900 Walking rounds completed with Shanique Gan RN. Pt identified by name and . 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 Pt is alert with confusion. Complete care. Incontinent of b/b. o2 @2l/min PRN. Pt resting in bed with eyes closed; no signs of pain or distress noted. RR non labored. Bed in lowest and locked position with siderails x2; call light within reach and tab alert in place. FLACC 0/10.     2108 Pt noted moaning in pain; PRN Roxanol given to manage pain. RR non labored. No signs of NVD or SOB. FLACC 0/10.     9762 Pt attempting to get out of bed and taking off gown. Scheduled Roxanol given per orders. PRN Lorazepam given for anxiety. No signs of pain or distress noted. No NVD or SOB. FLACC 0/10.     0131 Pt resting calmly in bed with eyes closed; no pain noted. RR non labored. No NVD or SOB. FLACC 0/10.     0248 Pt was medicated for agitation by Pepper Grover RN. Pt currently resting calmly in bed with eyes closed. No signs of pain or agitation. No NVD or SOB noted. FLACC 010.     0419  Pt resting calmly in bed with eyes closed; no pain noted. RR non labored. No NVD or SOB. FLACC 0/10.     6888 Scheduled medication given as ordered. Pt resting quietly in bed. No signs of pain or agitation noted. No NVD or SOB. FLACC 0/10. Walking rounds completed with Floyd Osborne RN.

## 2021-03-12 NOTE — PROGRESS NOTES
Problem: Falls - Risk of  Goal: *Absence of Falls  Description: Document Rita Mathias Fall Risk and appropriate interventions in the flowsheet. Outcome: Progressing Towards Goal  Note: Fall Risk Interventions:  Mobility Interventions: Bed/chair exit alarm    Mentation Interventions: Adequate sleep, hydration, pain control, Bed/chair exit alarm, Door open when patient unattended    Medication Interventions: Bed/chair exit alarm    Elimination Interventions: Bed/chair exit alarm, Call light in reach              Problem: Pressure Injury - Risk of  Goal: *Prevention of pressure injury  Description: Document Jose Scale and appropriate interventions in the flowsheet. Outcome: Progressing Towards Goal  Note: Pressure Injury Interventions:  Sensory Interventions: Assess changes in LOC, Assess need for specialty bed, Float heels, Keep linens dry and wrinkle-free, Minimize linen layers, Pressure redistribution bed/mattress (bed type)    Moisture Interventions: Absorbent underpads, Maintain skin hydration (lotion/cream), Minimize layers    Activity Interventions: Assess need for specialty bed, Pressure redistribution bed/mattress(bed type)    Mobility Interventions: Float heels, HOB 30 degrees or less, Pressure redistribution bed/mattress (bed type)    Nutrition Interventions: Document food/fluid/supplement intake    Friction and Shear Interventions: Apply protective barrier, creams and emollients, HOB 30 degrees or less, Minimize layers                Problem: Pain  Goal: Verbalize satisfaction of level of comfort and symptom control  Outcome: Progressing Towards Goal     Problem: Anxiety/Agitation  Goal: Verbalize and demonstrate ability to manage anxiety  Description: The patient/family/caregiver will verbalize and demonstrate ability to manage the patient's anxiety throughout hospice care.   Outcome: Progressing Towards Goal     Problem: End of Life Process  Goal: Demonstrate understanding of end of life processes  Description: Patient/caregiver will understand end of life processes. Outcome: Progressing Towards Goal     Problem: Breathing Pattern - Ineffective  Goal: *PALLIATIVE CARE:  Alleviation of Dyspnea  Outcome: Progressing Towards Goal     Problem: Anticipatory Grief  Goal: Grief heard and acknowledged, anxiety reduced, patient coping identified, patient/family expressed gratitude  Description: Patient / Family will acknowledge feelings  of grief and anxiety and utilize available support to help them cope throughout their grief journey.    Outcome: Progressing Towards Goal

## 2021-03-12 NOTE — [facility] ID[facility] NURSE NOTES
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 Incontinent and voiding x 5    IV access: None   PO intake: Eating well  Oxygen: Room Air  Wounds: Stage I sacral / Bilateral heel stage I  PRN medications: Haldol 2mg x 2 doses for agitation / Roxinol 5mg x1 dose for pain / Ativan 0.5mg x 1 dose for anxiety   Scheduled meds:  Roxinol 5mg q 6 hours / Senokot 8.6mg BID / Flomax 0.4mg QD  Plan: Comprehensive plan of care reviewed. IDG and pt./family in agreement with plan of care. The IDG identifies through on-going assessment when a change is needed to the POC; the pt/family will receive care and services necessitated by changes in POC. Medications reviewed by the pharmacist and Medical Director.       Signed by: Geronimo Rapp RN

## 2021-03-13 NOTE — PROGRESS NOTES
Problem: Falls - Risk of  Goal: *Absence of Falls  Description: Document Gume Brooksyvonne Fall Risk and appropriate interventions in the flowsheet. Outcome: Progressing Towards Goal  Note: Fall Risk Interventions:  Mobility Interventions: Bed/chair exit alarm, Patient to call before getting OOB    Mentation Interventions: Adequate sleep, hydration, pain control, Bed/chair exit alarm, Door open when patient unattended    Medication Interventions: Bed/chair exit alarm    Elimination Interventions: Bed/chair exit alarm, Call light in reach              Problem: Pressure Injury - Risk of  Goal: *Prevention of pressure injury  Description: Document Jose Scale and appropriate interventions in the flowsheet. Outcome: Progressing Towards Goal  Note: Pressure Injury Interventions:  Sensory Interventions: Assess changes in LOC, Float heels, Keep linens dry and wrinkle-free, Minimize linen layers, Pressure redistribution bed/mattress (bed type)    Moisture Interventions: Absorbent underpads, Maintain skin hydration (lotion/cream), Minimize layers    Activity Interventions: Assess need for specialty bed, Pressure redistribution bed/mattress(bed type)    Mobility Interventions: Float heels, HOB 30 degrees or less, Pressure redistribution bed/mattress (bed type)    Nutrition Interventions: Document food/fluid/supplement intake    Friction and Shear Interventions: Apply protective barrier, creams and emollients, HOB 30 degrees or less, Minimize layers                Problem: Pain  Goal: Verbalize satisfaction of level of comfort and symptom control  Outcome: Progressing Towards Goal     Problem: Anxiety/Agitation  Goal: Verbalize and demonstrate ability to manage anxiety  Description: The patient/family/caregiver will verbalize and demonstrate ability to manage the patient's anxiety throughout hospice care.   Outcome: Progressing Towards Goal     Problem: End of Life Process  Goal: Demonstrate understanding of end of life processes  Description: Patient/caregiver will understand end of life processes. Outcome: Progressing Towards Goal     Problem: Breathing Pattern - Ineffective  Goal: *PALLIATIVE CARE:  Alleviation of Dyspnea  Outcome: Progressing Towards Goal     Problem: Anticipatory Grief  Goal: Grief heard and acknowledged, anxiety reduced, patient coping identified, patient/family expressed gratitude  Description: Patient / Family will acknowledge feelings  of grief and anxiety and utilize available support to help them cope throughout their grief journey. Outcome: Progressing Towards Goal     Problem: Coping and Emotional Distress  Goal: Demonstrate Acceptance of Terminal Illness and Disease Progression  Description: Patient/family/caregiver will demonstrate acceptance of terminal disease and understanding of disease progression while employing appropriate mechanisms. Outcome: Progressing Towards Goal     Problem: Anticipatory Grief  Goal: Explore Reactions To and Verbalize Acceptance of Impending Loss  Description: Patient/family/caregiver will explore reactions to and verbalize acceptance of impending loss. Outcome: Progressing Towards Goal     Problem: Impaired Family Dynamics  Goal: Demonstrate Normal or Improving Interpersonal Relationships  Description: Patient/family/caregiver will demonstrate normal or improving interpersonal relationships. Outcome: Progressing Towards Goal  Goal: Demonstrate normal or improving family dynamics with regards to patient's terminal illness  Description: Demonstrate normal or improving family dynamics with regards to patient's terminal illness  Outcome: Progressing Towards Goal     Problem: Patient/Family/Caregiver Has Conflict  Goal: Verbalize feelings on relationships, problems, and/or fears  Description: The patient will verbalize feelings on relationships, problems and/or fears.   Outcome: Progressing Towards Goal  Goal: Verbalize understanding of physical changes and the reality of dying process  Description: The patient/family/caregiver will verbalize understanding of physical changes and the reality of the dying process.   Outcome: Progressing Towards Goal     Problem: Psychosocial General  Goal: Verbalize factors contributing to need for treatment and voice changes that will be made to prevent hospitalization  Outcome: Progressing Towards Goal     Problem: Anticipatory Grief  Goal: Grief heard and acknowledged, anxiety reduced, patient coping identified, patient/family expressed gratitude  Outcome: Progressing Towards Goal

## 2021-03-13 NOTE — PROGRESS NOTES
Problem: Falls - Risk of  Goal: *Absence of Falls  Description: Patient will not have any falls or injuries during shift . Outcome: Progressing Towards Goal  Note: Fall Risk Interventions:  Mobility Interventions: Bed/chair exit alarm, Patient to call before getting OOB    Mentation Interventions: Adequate sleep, hydration, pain control, Bed/chair exit alarm, Door open when patient unattended    Medication Interventions: Bed/chair exit alarm    Elimination Interventions: Bed/chair exit alarm, Call light in reach              Problem: Pressure Injury - Risk of  Goal: *Prevention of pressure injury  Description: Patient will not develop any pressure injuries during stay. Outcome: Progressing Towards Goal  Note: Pressure Injury Interventions:  Sensory Interventions: Assess changes in LOC, Float heels, Keep linens dry and wrinkle-free, Minimize linen layers, Pressure redistribution bed/mattress (bed type)    Moisture Interventions: Absorbent underpads, Maintain skin hydration (lotion/cream), Minimize layers    Activity Interventions: Assess need for specialty bed, Pressure redistribution bed/mattress(bed type)    Mobility Interventions: Float heels, HOB 30 degrees or less, Pressure redistribution bed/mattress (bed type)    Nutrition Interventions: Document food/fluid/supplement intake    Friction and Shear Interventions: Apply protective barrier, creams and emollients, HOB 30 degrees or less, Minimize layers                Problem: Pain  Goal: Verbalize satisfaction of level of comfort and symptom control  Description: Pt will remain free from pain aeb pain score or flacc less than 4 while at [facility].       Medications:  Roxanol 5 mg po scheduled q 6 hrs  Roxanol 5 mg po q 1 hr prn     Outcome: Progressing Towards Goal     Problem: Anxiety/Agitation  Goal: Verbalize and demonstrate ability to manage anxiety  Description: The patient's agitation/ anxiety will be controlled during shift aeb relaxed body and facial features and lack of trying to get out of bed.      Medications:    Haldol 2 mg po q 1 pr prn  Ativan 0.5 mg po q 6 hr prn  Outcome: Progressing Towards Goal

## 2021-03-13 NOTE — PROGRESS NOTES
Problem: Falls - Risk of  Goal: *Absence of Falls  Description: Document Starleen Freeze Fall Risk and appropriate interventions in the flowsheet. Outcome: Progressing Towards Goal  Note: Fall Risk Interventions:  Mobility Interventions: Bed/chair exit alarm    Mentation Interventions: Adequate sleep, hydration, pain control, Bed/chair exit alarm, Door open when patient unattended, Familiar objects from home, Reorient patient, Room close to nurse's station, Update white board    Medication Interventions: Bed/chair exit alarm    Elimination Interventions: Bed/chair exit alarm, Call light in reach       Patient has tab alert in place  Problem: Pressure Injury - Risk of  Goal: *Prevention of pressure injury  Description: Document Jose Scale and appropriate interventions in the flowsheet. Outcome: Progressing Towards Goal  Note: Pressure Injury Interventions:  Sensory Interventions: Assess changes in LOC, Assess need for specialty bed, Float heels, Keep linens dry and wrinkle-free, Minimize linen layers    Moisture Interventions: Absorbent underpads, Apply protective barrier, creams and emollients, Minimize layers, Moisture barrier    Activity Interventions: Assess need for specialty bed    Mobility Interventions: Float heels, HOB 30 degrees or less    Nutrition Interventions: Document food/fluid/supplement intake    Friction and Shear Interventions: Apply protective barrier, creams and emollients, Lift sheet, Minimize layers     Patient was turned and repositioned every two hours. Problem: Pain  Goal: Verbalize satisfaction of level of comfort and symptom control  Outcome: Progressing Towards Goal   No prn pain medications given this shift  Scheduled medications for pain given. Problem: Anxiety/Agitation  Goal: Verbalize and demonstrate ability to manage anxiety  Description: The patient/family/caregiver will verbalize and demonstrate ability to manage the patient's anxiety throughout hospice care.   Outcome: Progressing Towards Goal   Haldol 2 mg po for agitation times one with good results.

## 2021-03-13 NOTE — PROGRESS NOTES
0700- Report received, patient resting quietly in bed with no s/sx pain or distress. Call light within reach. Bed is low and locked, tab alert in place, and door left open for continuous monitoring. 1607- patient remains asleep. 1029- patient awake. Transferred to chair with assist x 2. Brief dry. meds given crushed in pudding. Patient fed full pudding. Up in chair watching tv. No needs made known at this time. No s/sx of pain or distress. 65- pt daughter Keron Zambrano at bedside. Update given on pt medications. Privacy code verified. 1220- patient ate about 30 % of meal. Had to be fed. Scheduled rxanol 5 mg given at this time. 1400- pt sleeping in chair. 1457- patient awake and culling covers off and removing pillows. Assisted to bed and incontinent brief changed. Prn dulcolax given for constipation. No s/sx of pain or distress. 1720- patient fed dinner and ate about 15%. When asked if he wanted some tea, he said, \" no I want to get out of here! \" patient did drink sips of tea when prompted. Medium inct bm cleaned up and patient turned to L side. Tab alert in place and bed alarm on.     1735- scheduled medications given. Patient denies pain.

## 2021-03-13 NOTE — PROGRESS NOTES
1900 Walking rounds completed with Alana Pro RN. Pt identified by name and . 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 Pt is alert with confusion. Complete care. Incontinent of b/b. o2 @2l/min PRN. Pt resting in bed with eyes closed; no signs of pain or distress noted. RR non labored. Bed in lowest and locked position with siderails x2; call light within reach and tab alert in place. FLACC 0/10.     2121 Pt resting calmly in bed with eyes closed; no pain noted. RR non labored. No agitation noted. No NVD or SOB. FLACC 0/10.     2304 Pt resting comfortably with eyes closed; no pain noted. RR non labored. No signs of agitation, NVD, or SOB noted. Pt repositioned for comfort and skin integrity. FLACC 0/10.     0112 Pt awake in bed; no signs of pain or agitation noted. Pt warching TV. RR even and non labored. No s/sx of NVD or SOB. FLACC 0/10.     0334 Pt quietly watching TV in bed. RR non labored. No signs of agitation, NVD, or SOB noted. FLACC 010.     0544  Pt resting calmly with eyes closed; no pain noted. RR non labored. No signs of agitation, NVD, or SOB noted. Pt repositioned for comfort and skin integrity. FLACC 010.     0550 Pt attempting to get out of bed and taking off his gown. Pt restless and agitated. PRN Lorazepam given to manage anxiety. Scheduled Roxanol given per orders. No s/sx of NVD or SOB. FLACC 010.     0616 Pt resting calmly with eyes closed; no pain noted. RR non labored. No signs of agitation, NVD, or SOB noted. Pt repositioned for comfort and skin integrity. FLACC 010. Walking rounds completed with Miriam Wade RN.

## 2021-03-14 NOTE — PROGRESS NOTES
Problem: Falls - Risk of  Goal: *Absence of Falls  Description: Patient will not have any falls or injuries during shift . Outcome: Progressing Towards Goal  Note: Fall Risk Interventions:  Mobility Interventions: Bed/chair exit alarm    Mentation Interventions: Adequate sleep, hydration, pain control, Bed/chair exit alarm, Door open when patient unattended    Medication Interventions: Bed/chair exit alarm    Elimination Interventions: Bed/chair exit alarm, Call light in reach              Problem: Pressure Injury - Risk of  Goal: *Prevention of pressure injury  Description: Patient will not develop any pressure injuries during stay. Outcome: Progressing Towards Goal  Note: Pressure Injury Interventions:  Sensory Interventions: Assess changes in LOC, Float heels, Keep linens dry and wrinkle-free, Minimize linen layers, Pressure redistribution bed/mattress (bed type)    Moisture Interventions: Absorbent underpads, Apply protective barrier, creams and emollients, Maintain skin hydration (lotion/cream), Minimize layers    Activity Interventions: Assess need for specialty bed, Pressure redistribution bed/mattress(bed type)    Mobility Interventions: Float heels, HOB 30 degrees or less, Pressure redistribution bed/mattress (bed type)    Nutrition Interventions: Document food/fluid/supplement intake    Friction and Shear Interventions: Apply protective barrier, creams and emollients, HOB 30 degrees or less, Minimize layers                Problem: Pain  Goal: Verbalize satisfaction of level of comfort and symptom control  Description: Pt will remain free from pain aeb pain score or flacc less than 4 while at [facility].       Medications:  Roxanol 5 mg po scheduled q 6 hrs  Roxanol 5 mg po q 1 hr prn     Outcome: Progressing Towards Goal     Problem: Anxiety/Agitation  Goal: Verbalize and demonstrate ability to manage anxiety  Description: The patient's agitation/ anxiety will be controlled during shift aeb relaxed body and facial features and lack of trying to get out of bed. Medications:    Haldol 2 mg po q 1 pr prn  Ativan 0.5 mg po q 6 hr prn  Outcome: Progressing Towards Goal     Problem: End of Life Process  Goal: Demonstrate understanding of end of life processes  Description: Patient/caregiver will understand end of life processes. Outcome: Progressing Towards Goal     Problem: Breathing Pattern - Ineffective  Goal: *PALLIATIVE CARE:  Alleviation of Dyspnea  Outcome: Progressing Towards Goal     Problem: Anticipatory Grief  Goal: Explore Reactions To and Verbalize Acceptance of Impending Loss  Description: Patient/family/caregiver will explore reactions to and verbalize acceptance of impending loss. Outcome: Progressing Towards Goal     Problem: Impaired Family Dynamics  Goal: Demonstrate Normal or Improving Interpersonal Relationships  Description: Patient/family/caregiver will demonstrate normal or improving interpersonal relationships.   Outcome: Progressing Towards Goal  Goal: Demonstrate normal or improving family dynamics with regards to patient's terminal illness  Description: Demonstrate normal or improving family dynamics with regards to patient's terminal illness  Outcome: Progressing Towards Goal

## 2021-03-14 NOTE — PROGRESS NOTES
0700- Report received, patient resting quietly in bed with no s/sx pain or distress. Call light within reach. Bed is low and locked, tab alert in place, and door left open for continuous monitoring. 0730- patient agitated, pulling off brief and bed alarm sounding. incont brief changed and patient assisted x 2 to chair. Pt transferred well and was weight bearing. Tab alert in place. No longer agitated, but watching tv and confused, but in good spirits. 0820- scheduled medications given crushed in yogurt. Patient ate about 30% of food. 1059- patient sleeping in chair. 1222- patient fed lunch by Dom Allen RN. Ate about 20%. Scheduled roxanol given at this time. No s/sx of pain or distress. 1430- patient assisted from chair to bed x 2 assist. Turned in the bed to L side. Daughter at bedside. 1640- Patient resting with eyes closed. No s/sx of pain or distress. Awakened for vitals. Repositioned to supine position and sitting up. He is now awake and watching tv. Inappropriate responses with questions asked, but patient in good spirits. 1740- patient awake. No s/sx of pain or distress. Scheduled medications given. Fed bites and sips, but acted disinterested in eating. 1840- Patient resting. No s/sx of pain or distress.     Report given to Shruti, [address] [facility]

## 2021-03-14 NOTE — PROGRESS NOTES
1900 Report received from Valerie Castelan RN. Pt identified by name and . 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 Pt is alert with confusion. Complete care. Incontinent of b/b. o2 @2l/min PRN. Pt resting in bed with eyes closed; no signs of pain or distress noted. RR non labored. Bed in lowest and locked position with siderails x2; call light within reach and tab alert in place. FLACC 0/10.     2006 Pt resting calmly in bed with eyes closed; no pain noted. RR non labored. No agitation noted. No NVD or SOB. FLACC 0/10.     4416 Pt resting comfortably with eyes closed; no pain noted. RR non labored. No signs of agitation, NVD, or SOB noted. Pt repositioned for comfort and skin integrity. FLACC 0/10.     0023 Pt resting calmly in bed with eyes closed; no pain noted. RR non labored. No agitation noted. No NVD or SOB. FLACC 0/10.     0101 Scheduled medication given per orders. Pt resting in bed with eyes closed; no pain noted. RR non labored. No agitation noted. No NVD or SOB. FLACC 010.      7047 Pt in bed with eyes closed; no signs of pain or discomfort. RR non labored. No signs of agitation, NVD, or SOB noted. Pt repositioned for comfort and skin integrity. FLACC 010.     0514 Pt resting comfortably with eyes closed; no pain noted. RR non labored. No signs of agitation, NVD, or SOB noted. Pt repositioned for comfort and skin integrity. FLACC 0/10.     4799 Scheduled medication given per orders. Pt resting calmly in bed with eyes closed; no pain noted. RR non labored. No agitation noted. No NVD or SOB. FLACC 010.     Walking report given to Valerie Castelan RN.

## 2021-03-14 NOTE — PROGRESS NOTES
Problem: Falls - Risk of  Goal: *Absence of Falls  Description: Patient will not have any falls or injuries during shift . Outcome: Progressing Towards Goal  Note: Fall Risk Interventions:  Mobility Interventions: Bed/chair exit alarm, Patient to call before getting OOB    Mentation Interventions: Adequate sleep, hydration, pain control, Bed/chair exit alarm, Door open when patient unattended    Medication Interventions: Bed/chair exit alarm    Elimination Interventions: Bed/chair exit alarm, Call light in reach              Problem: Pressure Injury - Risk of  Goal: *Prevention of pressure injury  Description: Patient will not develop any pressure injuries during stay. Outcome: Progressing Towards Goal  Note: Pressure Injury Interventions:  Sensory Interventions: Assess changes in LOC, Float heels, Keep linens dry and wrinkle-free, Minimize linen layers, Pressure redistribution bed/mattress (bed type)    Moisture Interventions: Absorbent underpads, Maintain skin hydration (lotion/cream), Minimize layers    Activity Interventions: Assess need for specialty bed, Pressure redistribution bed/mattress(bed type)    Mobility Interventions: Float heels, HOB 30 degrees or less, Pressure redistribution bed/mattress (bed type)    Nutrition Interventions: Document food/fluid/supplement intake    Friction and Shear Interventions: Apply protective barrier, creams and emollients, HOB 30 degrees or less, Minimize layers                Problem: Pain  Goal: Verbalize satisfaction of level of comfort and symptom control  Description: Pt will remain free from pain aeb pain score or flacc less than 4 while at [facility].       Medications:  Roxanol 5 mg po scheduled q 6 hrs  Roxanol 5 mg po q 1 hr prn     Outcome: Progressing Towards Goal     Problem: Anxiety/Agitation  Goal: Verbalize and demonstrate ability to manage anxiety  Description: The patient's agitation/ anxiety will be controlled during shift aeb relaxed body and facial features and lack of trying to get out of bed.      Medications:    Haldol 2 mg po q 1 pr prn  Ativan 0.5 mg po q 6 hr prn  Outcome: Progressing Towards Goal

## 2021-03-15 NOTE — PROGRESS NOTES
Progress Note    Patient: Shamar Martins MRN: [id number]  SSN: [id number]    YOB: 1935  Age: 81 y.o. Sex: male      Admit Date: 3/4/2021    LOS: 11 days     Clinical Summary:     Mr. Piter Marsh is drowsing and I awaken him with very gentle verbal stimuli. He is moderately confused though pleasant and in no distress. His vital signs are stable as outlined and his lungs are clear. 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 Unknown primary. Metastasis to lung of unknown origin ([address] (5/7/8379)      Metabolic encephalopathy (2/1/0076)      Hospice care patient (3/7/2021)      Bilateral pulmonary metastases ([address] (3/7/2021)      HCAP (healthcare-associated pneumonia) (3/7/2021)        Treatment Plan:      I have reviewed the patient's Plan of Care with the nursing staff. He remains stable in a Domiciliary status and no changes in treatment plan at present.           Current Facility-Administered Medications   Medication Dose Route Frequency    bisacodyL (DULCOLAX) suppository 10 mg  10 mg Rectal PRN    morphine (ROXANOL) concentrated oral syringe 5 mg  5 mg Oral Q6H    tamsulosin (FLOMAX) capsule 0.4 mg  0.4 mg Oral DAILY    nitroglycerin (NITROSTAT) tablet 0.4 mg  0.4 mg SubLINGual Q5MIN PRN    morphine (ROXANOL) concentrated oral syringe 5 mg  5 mg Oral Q1H PRN    Or    morphine (ROXANOL) concentrated oral syringe 5 mg  5 mg SubLINGual Q1H PRN    acetaminophen (TYLENOL) tablet 650 mg  650 mg Oral Q4H PRN    haloperidoL (HALDOL) tablet 2 mg  2 mg Oral Q1H PRN    diphenhydrAMINE (BENADRYL) capsule 25 mg  25 mg Oral Q6H PRN    LORazepam (ATIVAN) tablet 0.5 mg  0.5 mg Oral Q6H PRN    senna (SENOKOT) tablet 8.6 mg  1 Tab Oral BID    benzonatate (TESSALON) capsule 200 mg  200 mg Oral Q6H PRN    alum-mag hydroxide-simeth (MYLANTA) oral suspension 30 mL  30 mL Oral QID PRN    albuterol (PROVENTIL VENTOLIN) nebulizer solution 2.5 mg  2.5 mg Nebulization Q4H PRN    hyoscyamine SL (LEVSIN/SL) tablet 0.125 mg  0.125 mg SubLINGual Q4H PRN    LORazepam (ATIVAN) injection 2 mg  2 mg IntraVENous Q10MIN PRN           Signed By: Pamela Reynolds MD     March 15, 2021

## 2021-03-15 NOTE — PROGRESS NOTES
The patient report and walking rounds completed with Shon Gaytan RN. The patient was identified by name and . The patient is resting quietly with even and unlabored respirations. No signs of anxiety, SOB, nausea / vomit or pain observed. The bed is low and locked with two bed rails up and the call light within his reach, The patient's room is across from the nurses station and the door to the room is left open when the patient is alone. Summary  The patient remained alert to self only this shift. He has taken all scheduled medications in yogurt. He has required no prn medications. Patient was fed three meals by the CNA. The patient is incontinent of bladder and bowel. Patient only voided times one this shift. No BM. The patient's son, Astrid Washburn, is in and states that the patient will remain in the hospice house till he meets his demise. The blue book, Gone From Sight, was given to Astrid Washburn to read. Dr. Sven Garcia was requested by Astrid Washburn. And Dr Sven Garcia came and spoke with Astrid Washburn. Reported off to Anabel Mooney RN and Seamus Mathews RN. Walking rounds completed.

## 2021-03-15 NOTE — PROGRESS NOTES
Problem: Falls - Risk of  Goal: *Absence of Falls  Description: Patient will not have any falls or injuries during shift . Outcome: Progressing Towards Goal  Note: Fall Risk Interventions:  Mobility Interventions: Bed/chair exit alarm    Mentation Interventions: Adequate sleep, hydration, pain control, Bed/chair exit alarm, Door open when patient unattended, Familiar objects from home, Room close to nurse's station, Update white board    Medication Interventions: Bed/chair exit alarm    Elimination Interventions: Bed/chair exit alarm, Call light in reach       Patient has tab alert on and room is across from the nurses station. Problem: Pressure Injury - Risk of  Goal: *Prevention of pressure injury  Description: Patient will not develop any pressure injuries during stay. Outcome: Progressing Towards Goal  Note: Pressure Injury Interventions:  Sensory Interventions: Assess changes in LOC, Assess need for specialty bed, Float heels, Keep linens dry and wrinkle-free, Minimize linen layers    Moisture Interventions: Absorbent underpads, Apply protective barrier, creams and emollients, Assess need for specialty bed, Maintain skin hydration (lotion/cream), Minimize layers, Moisture barrier    Activity Interventions: Assess need for specialty bed    Mobility Interventions: Float heels, HOB 30 degrees or less    Nutrition Interventions: Document food/fluid/supplement intake    Friction and Shear Interventions: Apply protective barrier, creams and emollients, Lift sheet, Minimize layers     Patient was turned and repositioned several times for skin integrity and comfort. Problem: Anxiety/Agitation  Goal: Verbalize and demonstrate ability to manage anxiety  Description: The patient's agitation/ anxiety will be controlled during shift aeb relaxed body and facial features and lack of trying to get out of bed.      Medications:    Haldol 2 mg po q 1 pr prn  Ativan 0.5 mg po q 6 hr prn  Outcome: Progressing Towards Goal Patient required no prn medications for anxiety. Problem: End of Life Process  Goal: Demonstrate understanding of end of life processes  Description: Patient/caregiver will understand end of life processes. Outcome: Progressing Towards Goal   Patient's son, Noe Storm, in and was given the [employer] called Gone From Sight and asked to read and come back with any questions.

## 2021-03-15 NOTE — PROGRESS NOTES
Problem: Falls - Risk of  Goal: *Absence of Falls  Description: Patient will not have any falls or injuries during shift . Outcome: Progressing Towards Goal  Note: Fall Risk Interventions:  Mobility Interventions: Bed/chair exit alarm    Mentation Interventions: Adequate sleep, hydration, pain control, Bed/chair exit alarm, Door open when patient unattended    Medication Interventions: Bed/chair exit alarm    Elimination Interventions: Bed/chair exit alarm, Call light in reach              Problem: Pressure Injury - Risk of  Goal: *Prevention of pressure injury  Description: Patient will not develop any pressure injuries during stay. Outcome: Progressing Towards Goal  Note: Pressure Injury Interventions:  Sensory Interventions: Assess changes in LOC, Float heels, Keep linens dry and wrinkle-free, Minimize linen layers, Pressure redistribution bed/mattress (bed type)    Moisture Interventions: Absorbent underpads, Apply protective barrier, creams and emollients, Maintain skin hydration (lotion/cream), Minimize layers    Activity Interventions: Assess need for specialty bed, Pressure redistribution bed/mattress(bed type)    Mobility Interventions: Float heels, HOB 30 degrees or less, Pressure redistribution bed/mattress (bed type)    Nutrition Interventions: Document food/fluid/supplement intake    Friction and Shear Interventions: Apply protective barrier, creams and emollients, HOB 30 degrees or less, Minimize layers                Problem: Pain  Goal: Verbalize satisfaction of level of comfort and symptom control  Description: Pt will remain free from pain aeb pain score or flacc less than 4 while at [facility].       Medications:  Roxanol 5 mg po scheduled q 6 hrs  Roxanol 5 mg po q 1 hr prn     Outcome: Progressing Towards Goal     Problem: Anxiety/Agitation  Goal: Verbalize and demonstrate ability to manage anxiety  Description: The patient's agitation/ anxiety will be controlled during shift aeb relaxed body and facial features and lack of trying to get out of bed. Medications:    Haldol 2 mg po q 1 pr prn  Ativan 0.5 mg po q 6 hr prn  Outcome: Progressing Towards Goal     Problem: End of Life Process  Goal: Demonstrate understanding of end of life processes  Description: Patient/caregiver will understand end of life processes.   Outcome: Progressing Towards Goal     Problem: Psychosocial General  Goal: Verbalize factors contributing to need for treatment and voice changes that will be made to prevent hospitalization  Outcome: Progressing Towards Goal

## 2021-03-15 NOTE — PROGRESS NOTES
1900 Report received from Nikolai Phelps RN. Pt identified by name and . 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 Pt is alert with confusion. Complete care. Incontinent of b/b. o2 @2l/min PRN. Pt resting in bed with eyes closed; no signs of pain or distress noted. RR non labored. Bed in lowest and locked position with siderails x2; call light within reach and tab alert in place. FLACC 0/10.      2104 Pt resting comfortably with eyes closed; no pain noted. RR non labored. No signs of agitation, NVD, or SOB noted. Pt repositioned for comfort and skin integrity. FLACC 0/10.      2336 Scheduled medication per orders. Pt awake in bed resting quietly. RR non labored. No agitation noted. No NVD or SOB. FLACC 0/10.     0123 Pt restless in bed and agitated in bed. Pt attempting to get out of bed. PRN Morphine and Lorazepam given to manage symptoms. No NVD or SOB. FLACC 6/10.     0211 Pt in bed with eyes closed; no signs of pain or discomfort. RR non labored. No signs of agitation, NVD, or SOB noted. Pt repositioned for comfort and skin integrity. FLACC 010.     0449 Pt resting comfortably with eyes closed; no pain noted. RR non labored. No signs of agitation, NVD, or SOB noted. FLACC 0/10.     8327 Scheduled medication given per orders. Pt resting calmly in bed with eyes closed; no pain noted. RR non labored. No agitation noted. No NVD or SOB. FLACC 0/10. Walking report given to Vaishnavi Flood RN.

## 2021-03-16 NOTE — PROGRESS NOTES
5702- Patient report and walking rounds completed with Paula Gonzalez, RN and Lul Hurst RN. Patient was identified by name and . Patient is DOM care with discharge plans to remain DOM care until time of his demise. Diagnosis is Cancer of unknown origin. We are treating symptoms of pain with po medications scheduled and prn. We are treating symptoms of agitation with prn medications. Patient is resting with eyes closed and respirations at 12 minute and unlabored. No signs of pain, nausea/ vomit or agitation observed at this time. The bed is low and locked with two bed rails up and the tab alert in place. The patient's room is across from the nurses station and the door to the patient's room is left open for close observation. 1452- Scheduled senna and flomax administered with yogurt. Patient took medications with no problems. He continues to deny any distress and remains alert to person only at this time. 0930- Patient was bathed and shaved by Alice Coombs RN and this RN. Patient had incontinent void. Patient is very weak and cannot assist with standing at this time. Patient took in sips of water. 1130- Patient found with feet off the bed and on the floor. Attempted to stand patient but the patient is unable to stand to his feet. Incontinent void. Brief changed. Tab alerts in place. 1200- Patient is restless and agitated. Medicated with scheduled Roxanol 5 mg and with prn Lorazepam 1 mg po. Patient has eaten bites of his lunch and states that he is finished. 1230- Patient is resting in the bed with his eyes closed. No signs of distress observed. 1300- Patient is restless. Brief wet. Changed brief and repositioned the patient for comfort. 1400- Patient continues to rest quietly in the bed with eyes closed. Room is dark. 1630- Patient was woke from his nap. His daughter, Mason Emerson, was at the bedside. Updated her on the patient's day and she voiced understanding.  Fed patient supper. He ate 1/2 of his food and was finished. He was unable to pull his drink from a straw so a syringe was used. 1740- Patient has dry brief. Reported off to Farhad Hurtado RN and completed walking rounds.

## 2021-03-16 NOTE — PROGRESS NOTES
191 Report received from Mary Breaux RN. Patient identified by name and . Patient in bed with eyes closed. No signs or symptoms of pain, anxiety, dyspnea, or nausea/vomiting. Oxygen at 1lpm via NC. Patient currently wearing briefs due to incontinence, brief currently clean and dry. FLAAC 0/10. Safety measures in place including, door open for continuous monitoring, bed in low locked position, tab alert placed and functional, and side rails up x2.    2130 Patient lying in bed undoing the tabs on his brief, patient is redirectable at first but quickly gets agitated when assisted to put his blankets back on. Once task is complete he calms down. Brief remains clean and dry at this time. FLACC 0/10 following care. Safety measures in place including, door open for continuous monitoring, bed in low locked position, tab alert placed and functional, and side rails up x2.    2321 Patient in bed becoming agitated following having to have his brief changed. Patient with scheduled Roxanol and Haldol due and given per order. Patient took medications without any issues noted. Patient returned to lying in bed with acute distress noted. Safety measures in place including, door open for continuous monitoring, bed in low locked position, tab alert placed and functional, and side rails up x2.    0015 Patient lying in bed with his eyes closed in no acute distress noted. Will continue to monitor for changes and comfort. Safety measures in place including, door open for continuous monitoring, bed in low locked position, tab alert placed and functional, and side rails up x2.    0205 Patient lying in bed with eyes open and speaking out, he does not appear to be in any acute distress at this time. Will continue to monitor for changes and comfort.  Safety measures in place including, door open for continuous monitoring, bed in low locked position, tab alert placed and functional, and side rails up x2.    0400  Patient lying in bed with eyes open and speaking out, he does not appear to be in any acute distress at this time. Will continue to monitor for changes and comfort. Safety measures in place including, door open for continuous monitoring, bed in low locked position, tab alert placed and functional, and side rails up x2.    6867 Patient lying in bed with eyes open and speaking out, he does not appear to be in any acute distress at this time. Will continue to monitor for changes and comfort. Safety measures in place including, door open for continuous monitoring, bed in low locked position, tab alert placed and functional, and side rails up x2.     Walking report given to Katelynn Helm,, RN

## 2021-03-16 NOTE — PROGRESS NOTES
0538-Patient resting in bed with eyes opened. Patient alert but confused. Scheduled Roxanol 5mg PO given at this time. Incontinence care noted and patient cleansed. Void x 1. Patient appears agitated at times with pulling gown and brief and requires redirection. No other needs seen. 0609-Patient resting in bed with eyes closed after previous dose of scheduled medication. Patient continues to say words periodically incoherently.

## 2021-03-16 NOTE — PROGRESS NOTES
Follow up:  Mr. Lennox Ponder is lying quietly in bed with eyes closed. He appears to be sleeping.  offered prayer at bedside.

## 2021-03-16 NOTE — PROGRESS NOTES
Problem: Falls - Risk of  Goal: *Absence of Falls  Description: Patient will not have any falls or injuries during shift . Outcome: Progressing Towards Goal  Note: Fall Risk Interventions:  Mobility Interventions: Bed/chair exit alarm    Mentation Interventions: Adequate sleep, hydration, pain control, Bed/chair exit alarm, Door open when patient unattended, Familiar objects from home, Reorient patient, Update white board    Medication Interventions: Bed/chair exit alarm    Elimination Interventions: Bed/chair exit alarm, Call light in reach     Bed alarm in place. No falls this shift. Problem: Pain  Goal: Verbalize satisfaction of level of comfort and symptom control  Description: Pt will remain free from pain aeb pain score or flacc less than 4 while at [facility]. Medications:  Roxanol 5 mg po scheduled q 6 hrs  Roxanol 5 mg po q 1 hr prn     Outcome: Progressing Towards Goal   No prn pain medications required  Problem: Anxiety/Agitation  Goal: Verbalize and demonstrate ability to manage anxiety  Description: The patient's agitation/ anxiety will be controlled during shift aeb relaxed body and facial features and lack of trying to get out of bed. Medications:    Haldol 2 mg po q 1 pr prn  Ativan 0.5 mg po q 6 hr prn  Outcome: Progressing Towards Goal   One prn Lorazepam po required. Problem: Breathing Pattern - Ineffective  Goal: *PALLIATIVE CARE:  Alleviation of Dyspnea  Outcome: Progressing Towards Goal   HOB up to 30 degrees at times as patient is having periods of dyspnea.

## 2021-03-16 NOTE — PROGRESS NOTES
Problem: Falls - Risk of  Goal: *Absence of Falls  Description: Patient will not have any falls or injuries during shift . Outcome: Progressing Towards Goal  Note: Fall Risk Interventions:  Mobility Interventions: Bed/chair exit alarm    Mentation Interventions: Adequate sleep, hydration, pain control, Bed/chair exit alarm, Door open when patient unattended, Evaluate medications/consider consulting pharmacy, Room close to nurse's station    Medication Interventions: Bed/chair exit alarm, Evaluate medications/consider consulting pharmacy    Elimination Interventions: Bed/chair exit alarm, Call light in reach              Problem: Pressure Injury - Risk of  Goal: *Prevention of pressure injury  Description: Patient will not develop any pressure injuries during stay. Outcome: Progressing Towards Goal  Note: Pressure Injury Interventions:  Sensory Interventions: Assess changes in LOC, Check visual cues for pain, Float heels, Keep linens dry and wrinkle-free, Minimize linen layers, Pad between skin to skin    Moisture Interventions: Absorbent underpads, Apply protective barrier, creams and emollients, Limit adult briefs, Minimize layers, Moisture barrier    Activity Interventions: Assess need for specialty bed    Mobility Interventions: Float heels    Nutrition Interventions: Document food/fluid/supplement intake    Friction and Shear Interventions: Apply protective barrier, creams and emollients, Lift sheet, Minimize layers                Problem: Pain  Goal: Verbalize satisfaction of level of comfort and symptom control  Description: Pt will remain free from pain aeb pain score or flacc less than 4 while at [facility].       Medications:  Roxanol 5 mg po scheduled q 6 hrs  Roxanol 5 mg po q 1 hr prn     Outcome: Progressing Towards Goal     Problem: Anxiety/Agitation  Goal: Verbalize and demonstrate ability to manage anxiety  Description: The patient's agitation/ anxiety will be controlled during shift aeb relaxed body and facial features and lack of trying to get out of bed. Medications:    Haldol 2 mg po q 1 pr prn  Ativan 0.5 mg po q 6 hr prn  Outcome: Progressing Towards Goal     Problem: End of Life Process  Goal: Demonstrate understanding of end of life processes  Description: Patient/caregiver will understand end of life processes.   Outcome: Progressing Towards Goal     Problem: Breathing Pattern - Ineffective  Goal: *PALLIATIVE CARE:  Alleviation of Dyspnea  Outcome: Progressing Towards Goal

## 2021-03-17 NOTE — PROGRESS NOTES
Problem: Falls - Risk of  Goal: *Absence of Falls  Description: Patient will not have any falls or injuries during shift . Outcome: Progressing Towards Goal  Note: Fall Risk Interventions:  Mobility Interventions: Bed/chair exit alarm    Mentation Interventions: Adequate sleep, hydration, pain control, Bed/chair exit alarm, Door open when patient unattended, Familiar objects from home, Reorient patient, Update white board    Medication Interventions: Bed/chair exit alarm    Elimination Interventions: Bed/chair exit alarm, Call light in reach              Problem: Pressure Injury - Risk of  Goal: *Prevention of pressure injury  Description: Patient will not develop any pressure injuries during stay. Outcome: Progressing Towards Goal  Note: Pressure Injury Interventions:  Sensory Interventions: Assess changes in LOC, Assess need for specialty bed, Float heels, Keep linens dry and wrinkle-free, Minimize linen layers    Moisture Interventions: Absorbent underpads, Apply protective barrier, creams and emollients, Minimize layers, Moisture barrier    Activity Interventions: Assess need for specialty bed    Mobility Interventions: Float heels, HOB 30 degrees or less    Nutrition Interventions: Document food/fluid/supplement intake    Friction and Shear Interventions: Apply protective barrier, creams and emollients, HOB 30 degrees or less, Lift sheet, Minimize layers                Problem: Anxiety/Agitation  Goal: Verbalize and demonstrate ability to manage anxiety  Description: The patient's agitation/ anxiety will be controlled during shift aeb relaxed body and facial features and lack of trying to get out of bed.      Medications:    Haldol 2 mg po q 1 pr prn  Ativan 0.5 mg po q 6 hr prn  Outcome: Progressing Towards Goal     Problem: Breathing Pattern - Ineffective  Goal: *PALLIATIVE CARE:  Alleviation of Dyspnea  Outcome: Progressing Towards Goal

## 2021-03-17 NOTE — PROGRESS NOTES
1850-Report received from Lesia Martinez RN. Patient identified by name and . Patient resting quietly in bed with eyes opened. No signs or symptoms of distress, pain, agitation/anxiety, or SOB noted at present. Bed locked and in lowest position, side rails up x 2, call bell within reach, tab alarm in place. No family present at bedside. Door open for continuous monitoring. 1935-Nurse noted patient pulling at brief during rounds. Incontinence noted. Nurse and CNA provided incontinence care. No bowel movement noted. Patient continues to be confused. Patient was able to take sips of water and verbalized, \"good. \" Patient admitted to [facility] on 3/4/2021 with a terminal diagnosis of primary cancer of unknown site and cell type. Patient had been GIP level of care on admission and is now DOM level of care. Patient continues to be confused with periods of attempting to get out of bed. Patient appetite remains fair. Patient requires redirection multiple times. 2347-Patient resting in bed with eyes opened. Patient seen pulling at brief. Patient continues to be confused but redirected by nurse. Scheduled Roxanol 5mg PO given at this time. Patient consumed without difficulty. CNA and nurse positioned patient in bed. No signs of pain or distress seen. FLACC 0.    0345-Patient laying flat in bed with eyes opened having random conversations. No signs of agitation or attempting to get out of bed seen. No other immediate needs seen. FLACC 0.    0509-Scheduled Roxanol 5mg PO given at this time. Patient tolerated well. Incontinence care completed with help of other staff nurse. Void x1. Patient continues to be confused. No other immediate needs seen. FLACC 0.    0625-Patient resting comfortably in bed with eyes closed after previous dose of scheduled medication. Respirations unlabored. FLACC 0.     Report given to Ciro Kline RN

## 2021-03-17 NOTE — PROGRESS NOTES
Report received from off-going nurse, Iona Solorzano RN walking rounds completed. visual identification made, assumed care of pt. Pt resting quietly with eyes closed, no agitation or restlessness, no grimacing or groaning. Pt respirations unlabored. Tab alert in place, rails up x 2, bed in lowest position, safety maintained. FLACC 0.    0800 pt resting quietly,no grimacing, or groaning. 0705 pt awake, administered scheduled po medications, crushed in apple sauce. Fed pt 30% of his breakfast and 100 cc water. Physical assessment completed. Lung sounds clear but diminished. Heart sounds irregular, pt alert attempting to speak with garbled speech, abdomen soft with active bowel sounds, extremities warm with palpable pulses. 1038 completed bed bath, applied cavalon to sacrum and heels and did hygiene care. Shaved pt, applied lotion to extremities and with two assist transferred to riley-chair. Pt tolerated with occasional groan. 1057 pt son at bedside. 1124 administered scheduled medication and sips of water. With son sitting next to riley-chair. 1250 fed pt about 10% of his meal, pt was drowsy and kept drifting off to sleep. Family no longer at bedside. 1318 pt resting quietly, in riley-chair, no grimacing, groaning or agitation     1450 pt continues to rest quietly in riley-chair,     1605 pt resting quietly in riley-chair.      1739 pt was fed over 90% of his dinner, with two assist transferred to bed and placed warm blanket over pt.     1800 pt resting quietly, no grimacing, groaning or agitation    Report given to Coty Gottlieb RN

## 2021-03-17 NOTE — PROGRESS NOTES
Problem: Pain  Goal: Verbalize satisfaction of level of comfort and symptom control  Description: Pt will remain free from pain aeb pain score or flacc less than 4 while at [facility]. Medications:  Roxanol 5 mg po scheduled q 6 hrs  Roxanol 5 mg po q 1 hr prn     Outcome: Progressing Towards Goal  Note: Pt pain would be less then 4/10  Roxanol 5 mg po q 6 hours scheduled  Roxanol 5 mg po q 1 hour prn      Problem: Anxiety/Agitation  Goal: Verbalize and demonstrate ability to manage anxiety  Description: The patient's agitation/ anxiety will be controlled during shift aeb relaxed body and facial features and lack of trying to get out of bed.      Medications:    Haldol 2 mg po q 1 pr prn  Ativan 0.5 mg po q 6 hr prn  Outcome: Progressing Towards Goal  Note: Pt would be relaxed as indicated by no moaning, groaning or attempting to get out of bed  Haldol 2 mg po q 1 hour prn   Ativan 0.5 mg po q 6 hours prn

## 2021-03-18 NOTE — PROGRESS NOTES
Report received from off-going nurse,Jessie Arvizu RN visual identification made, assumed care of pt. Pt resting quietly with eyes closed, no agitation or restlessness, no grimacing or groaning. Pt respirations unlabored. Tab alert in place, rails up x 2, bed in lowest position, safety maintained. FLACC 0.    0849 pt resting quietly, no grimacing, groaning or agitation. Will administer oral medications when pt awakens    0930 pt resting quietly, no grimacing, groaning or agitation. Will administer oral medication when pt awakens. 1031 awakened pt and administered oral medications with one cup of apple sauce and sips of water. With two assist changed saturated brief, gave incontinent care and applied moisture barrier cream to sacrum and bilateral heels. Administered bisacodyl [place]. Physical assessment completed, lung sounds clear but diminished in bases, heart sounds regular, pt alert but speech garbled with occasional recognizable word, abdomen soft, hypoactive bowel sounds, pt incontinent of alyssa colored urine. Extremities warm with palpable pulses. 1200 administered pt scheduled Roxanol under his tongue, pt drowsy opens eyes but will not take a bite of lunch, attempted numerous times, pt sipped some tea. 1350 pt continues to rest quietly gave son in law report. 1455 pt resting quietly, no grimacing, groaning or agitation     1547 pt resting quietly, no grimacing, groaning or agitation     1620 pt son,Kyree at bedside. [phone] with two assist transferred pt to riley-chair, pt's son took pt out on the patio. Pt was unsteady on his feet. 0 son next to pt's chair, fed pt 80% of his food and 120 cc of ice tea. 1815 pt sitting up in riley-chair, appears to be asleep. Daughter at his side. 31 [phone] with three assist transferred pt to bed. Pt unsteady on feet.     Report given to Elizabeth Beck RN

## 2021-03-18 NOTE — PROGRESS NOTES
Problem: Falls - Risk of  Goal: *Absence of Falls  Description: Patient will not have any falls or injuries during shift . Outcome: Progressing Towards Goal  Note: Fall Risk Interventions:  Mobility Interventions: Bed/chair exit alarm, Patient to call before getting OOB    Mentation Interventions: Adequate sleep, hydration, pain control, Bed/chair exit alarm, Door open when patient unattended, Evaluate medications/consider consulting pharmacy, Reorient patient, Room close to nurse's station    Medication Interventions: Evaluate medications/consider consulting pharmacy, Bed/chair exit alarm    Elimination Interventions: Bed/chair exit alarm, Call light in reach              Problem: Pressure Injury - Risk of  Goal: *Prevention of pressure injury  Description: Patient will not develop any pressure injuries during stay. Outcome: Progressing Towards Goal  Note: Pressure Injury Interventions:  Sensory Interventions: Assess changes in LOC, Float heels, Minimize linen layers, Check visual cues for pain, Keep linens dry and wrinkle-free, Pad between skin to skin    Moisture Interventions: Absorbent underpads, Maintain skin hydration (lotion/cream), Apply protective barrier, creams and emollients, Minimize layers, Moisture barrier, Limit adult briefs    Activity Interventions: Pressure redistribution bed/mattress(bed type)    Mobility Interventions: Pressure redistribution bed/mattress (bed type), Float heels    Nutrition Interventions: Document food/fluid/supplement intake    Friction and Shear Interventions: Minimize layers, Lift sheet                Problem: Pain  Goal: Verbalize satisfaction of level of comfort and symptom control  Description: Pt will remain free from pain aeb pain score or flacc less than 4 while at [facility].       Medications:  Roxanol 5 mg po scheduled q 6 hrs  Roxanol 5 mg po q 1 hr prn     Outcome: Progressing Towards Goal     Problem: Anxiety/Agitation  Goal: Verbalize and demonstrate ability to manage anxiety  Description: The patient's agitation/ anxiety will be controlled during shift aeb relaxed body and facial features and lack of trying to get out of bed.      Medications:    Haldol 2 mg po q 1 pr prn  Ativan 0.5 mg po q 6 hr prn  Outcome: Progressing Towards Goal     Problem: Breathing Pattern - Ineffective  Goal: *PALLIATIVE CARE:  Alleviation of Dyspnea  Outcome: Progressing Towards Goal

## 2021-03-18 NOTE — PROGRESS NOTES
Problem: Pain  Goal: Verbalize satisfaction of level of comfort and symptom control  Description: Pt will remain free from pain aeb pain score or flacc less than 4 while at [facility]. Medications:  Roxanol 5 mg po scheduled q 6 hrs  Roxanol 5 mg po q 1 hr prn     Outcome: Progressing Towards Goal  Note: Pt pain would be less then 4/10  Roxanol 5 mg po every 6 hours scheduled  Roxanol 5 mg po/sl q 1 hour prn     Problem: Anxiety/Agitation  Goal: Verbalize and demonstrate ability to manage anxiety  Description: The patient's agitation/ anxiety will be controlled during shift aeb relaxed body and facial features and lack of trying to get out of bed.      Medications:    Haldol 2 mg po q 1 pr prn  Ativan 0.5 mg po q 6 hr prn  Outcome: Progressing Towards Goal  Note: Pt would be relaxed as indicated by no agitation, moaning or groaning  Haldol 2 mg po q 1 hour prn   Ativan 0.5 mg po q 6 hours prn

## 2021-03-18 NOTE — PROGRESS NOTES
Received report from [facility], RN. Pt Id by name and  during rounds.   Pt lying in bed. Lethargic. Eyes closed. No facial grimace. Flacc =0-1. Resp non labored shallow on RA. Lungs diminished. HR irreg. BS hypo. Brief clean/dry. SR up x 2. Bed low/locked. Call light with in reach. Door opened.   Scheduled morphine 5 mg sl given. Pt tolerated well. 0220 Pt sleeping. No facial grimace. Flacc =0-1. Resp non labored on RA. SR up x 2. Bed low/locked. Call light witrh in reach. Door opened. 0415  Pt sleeping. No facial grimace. Flacc =0-1. Resp non labored on RA. SR up x 2. Bed low/locked. Call light witrh in reach. Door opened. 0530  Pt awake. Confused. Agitated. Taking gown and brief off. Scheduled morphine 5 mg sl given along with PRN 0.5 mg ativan dissolved in water po. Pt repositioned in bed. SR up x 2. Bed low/locked. Call light with in reach. Tab alerts and bed alarm on. Door opened. Report given to [facility], RN.

## 2021-03-19 NOTE — PROGRESS NOTES
4556- The patient report and walking rounds completed with Regina Armas RN. The patient is identified by name and . The patient is DOM care with diagnosis of Cancer of unknown origin. The discharge plan is for the patient to remain at [facility] with [facility] until his demise. The patient is resting quietly in the bed. Pain is at 0/10 using non verbal scale. No signs of agitation, nausea / vomit or SOB observed. The bed is low and locked with two bed rails up and the call light within the reach of the patient. 3414- Scheduled AM medications administered in yogurt. Patient took with no problems. Patient is alert to person only. Incontinent void. Brief changed and rajani area cleaned. Patient tolerated well. The CNA is now feeding the patient his breakfast.     1030- Patient was repositioned in the bed for comfort. Brief is dry. Patient is resting with eyes closed. No signs of distress. 1148- Scheduled Morphine administered po.     1235- Patient was repositioned in the bed to his right side. He was offered lunch. Patient states, \"No. \" Patient continues to rest with his eyes closed. No signs of distress. 1430- Patient continues to rest with no signs of distress observed. Patient was repositioned for comfort. Reported off to Royal keith RN.

## 2021-03-19 NOTE — PROGRESS NOTES
1830  Received report from [facility], RN. Pt Id by name and  during rounds.   Pt lying in bed. Eyes closed. No facial grimace. Flacc =0-1. Resp non labored on RA. Lungs diminished. HR irreg. BS hypo. No edema noted. Brief clean/dry. SR up x 2. Bed low/locked. Call light with in reach. Door opened. Tab alert and bed alarm on.     2126  Pt resting with eyes closed. No s/sx of distress. Flacc =0-1. Resp non labored on RA. SR up x 2. Bed low/locked. Call light with in reach. Door opened. 2354  Pt agitated, anxious. Ativan 0.5 mg sl along with scheduled morphine 5 mg sl given. Flacc =0-1. Resp non labored on RA. SR up x 2. Bed low/locked. Call light with in reach. Tab alerts and bed alarm on.     0100  Pt calm. Lying in bed. Eyes closed. No facial grimace. Flacc =0-1. Resp non labored on RA. SR up x 2. Bed low/locked. Call light with in reach. Door opened. 0303  Pt sleeping. Calm. No facial grimace. Flacc =0-1. Resp non labored on RA. SR up x 2. Bed low/locked. Call light with in reach. Tab alert and bed alarm on. Door opened. 0534  Pt resting comfortably. No facial grimace. Flacc =0-1. Resp non labored on RA. Scheduled morphine 5 mg sl given. Pt tolerated well. SR up x 2. Bed low/locked. Call light with in reach. Door opened. Report given to Mingo Delacruz RN.

## 2021-03-19 NOTE — PROGRESS NOTES
Problem: Falls - Risk of  Goal: *Absence of Falls  Description: Patient will not have any falls or injuries during shift . Outcome: Progressing Towards Goal  Note: Fall Risk Interventions:  Mobility Interventions: Bed/chair exit alarm    Mentation Interventions: Bed/chair exit alarm, Adequate sleep, hydration, pain control, Door open when patient unattended, Evaluate medications/consider consulting pharmacy, Reorient patient, Room close to nurse's station    Medication Interventions: Bed/chair exit alarm, Evaluate medications/consider consulting pharmacy    Elimination Interventions: Bed/chair exit alarm, Call light in reach              Problem: Pressure Injury - Risk of  Goal: *Prevention of pressure injury  Description: Patient will not develop any pressure injuries during stay. Outcome: Progressing Towards Goal  Note: Pressure Injury Interventions:  Sensory Interventions: Assess changes in LOC, Float heels, Minimize linen layers, Keep linens dry and wrinkle-free, Check visual cues for pain, Pad between skin to skin    Moisture Interventions: Absorbent underpads, Apply protective barrier, creams and emollients, Moisture barrier, Limit adult briefs, Minimize layers    Activity Interventions: Pressure redistribution bed/mattress(bed type)    Mobility Interventions: Pressure redistribution bed/mattress (bed type), Float heels    Nutrition Interventions: Document food/fluid/supplement intake    Friction and Shear Interventions: Minimize layers, Apply protective barrier, creams and emollients, Lift sheet                Problem: Pain  Goal: Verbalize satisfaction of level of comfort and symptom control  Description: Pt will remain free from pain aeb pain score or flacc less than 4 while at [facility].       Medications:  Roxanol 5 mg po scheduled q 6 hrs  Roxanol 5 mg po q 1 hr prn     Outcome: Progressing Towards Goal     Problem: Breathing Pattern - Ineffective  Goal: *PALLIATIVE CARE:  Alleviation of Dyspnea  Outcome: Progressing Towards Goal

## 2021-03-19 NOTE — PROGRESS NOTES
Problem: Falls - Risk of  Goal: *Absence of Falls  Description: Patient will not have any falls or injuries during shift . Outcome: Progressing Towards Goal  Note: Fall Risk Interventions:  Mobility Interventions: Bed/chair exit alarm    Mentation Interventions: Bed/chair exit alarm, Door open when patient unattended, Reorient patient, Room close to nurse's station    Medication Interventions: Bed/chair exit alarm    Elimination Interventions: Bed/chair exit alarm, Call light in reach     Patient was rounded on every hour  Problem: Pressure Injury - Risk of  Goal: *Prevention of pressure injury  Description: Patient will not develop any pressure injuries during stay. Outcome: Progressing Towards Goal  Note: Pressure Injury Interventions:  Sensory Interventions: Assess changes in LOC, Float heels, Keep linens dry and wrinkle-free    Moisture Interventions: Absorbent underpads, Apply protective barrier, creams and emollients, Assess need for specialty bed, Check for incontinence Q2 hours and as needed    Activity Interventions: Pressure redistribution bed/mattress(bed type)    Mobility Interventions: Float heels, HOB 30 degrees or less    Nutrition Interventions: Document food/fluid/supplement intake    Friction and Shear Interventions: Apply protective barrier, creams and emollients, Minimize layers  Patient was turned and repositioned every 2 hours  Problem: Pain  Goal: Verbalize satisfaction of level of comfort and symptom control  Description: Pt will remain free from pain aeb pain score or flacc less than 4 while at [facility].       Medications:  Roxanol 5 mg po scheduled q 6 hrs  Roxanol 5 mg po q 1 hr prn     Outcome: Progressing Towards Goal   Patient takes scheduled Roxanol for pain

## 2021-03-19 NOTE — [facility] IDG MASTER NOTE
Hospice Interdisciplinary Group Collaborative  Date: 03/19/21  Time: 4:38 PM    ___________________    Patient: Ria Smith  Coverage Information:     Payor: [facility] MEDICARE     Plan: [facility] MEDICARE PART A AND B     Subscriber ID: [id number]     Phone Number:   MRN: [id number]  Current Benefit Period: Benefit Period 1  Start Date: 3/4/2021  End Date: 6/1/2021        Hospice Attending Provider: Radha Roe[address]  [address]  Phone: [phone]  Fax: [phone]    Level of Care: Routine Home Care      ___________________    Diagnoses: The primary encounter diagnosis was Pain of left thigh. 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     Current Medications:    Current Facility-Administered Medications:     bisacodyL (DULCOLAX) suppository 10 mg, 10 mg, Rectal, PRN, Rebel Crease, NP, 10 mg at 03/18/21 1006    morphine (ROXANOL) concentrated oral syringe 5 mg, 5 mg, Oral, Q6H, Rebel Agarwal, NP, 5 mg at 03/19/21 1148    tamsulosin (FLOMAX) capsule 0.4 mg, 0.4 mg, Oral, DAILY, Janett Finnegan MD, 0.4 mg at 03/19/21 0807    nitroglycerin (NITROSTAT) tablet 0.4 mg, 0.4 mg, SubLINGual, Q5MIN PRN, Janett Finnegan MD    morphine (ROXANOL) concentrated oral syringe 5 mg, 5 mg, Oral, Q1H PRN, 5 mg at 03/11/21 0317 **OR** morphine (ROXANOL) concentrated oral syringe 5 mg, 5 mg, SubLINGual, Q1H PRN, Janett Finnegan MD, 5 mg at 03/15/21 0123    acetaminophen (TYLENOL) tablet 650 mg, 650 mg, Oral, Q4H PRN, Janett Finnegan MD    haloperidoL (HALDOL) tablet 2 mg, 2 mg, Oral, Q1H PRN, Janett Finnegan MD, 2 mg at 03/15/21 2321    diphenhydrAMINE (BENADRYL) capsule 25 mg, 25 mg, Oral, Q6H PRN, Tari Bob MD, 25 mg at 03/10/21 2106    LORazepam (ATIVAN) tablet 0.5 mg, 0.5 mg, Oral, Q6H PRN, Tari Bob MD, 0.5 mg at 03/18/21 2356    senna (SENOKOT) tablet 8.6 mg, 1 Tab, Oral, BID, Tari Bob MD, 8.6 mg at 03/19/21 5420    benzonatate (TESSALON) capsule 200 mg, 200 mg, Oral, Q6H PRN, Tari Bob MD    alum-mag hydroxide-simeth (MYLANTA) oral suspension 30 mL, 30 mL, Oral, QID PRN, Tari Bob MD    albuterol (PROVENTIL VENTOLIN) nebulizer solution 2.5 mg, 2.5 mg, Nebulization, Q4H PRN, Tari Bob MD    hyoscyamine SL (LEVSIN/SL) tablet 0.125 mg, 0.125 mg, SubLINGual, Q4H PRN, Tari Bob MD    LORazepam (ATIVAN) injection 2 mg, 2 mg, IntraVENous, Q10MIN PRN, Tari Bob MD    Orders:  Orders Placed This Encounter    IP CONSULT TO SPIRITUAL CARE     Standing Status:   Standing     Number of Occurrences:   1     Order Specific Question:   Reason for Consult: Answer: Once on week one, then PRN. For [facility] Patients Only. For contracted patients, their primary hospice will continue to manage spiritual care needs.  DIET REGULAR     Standing Status:   Standing     Number of Occurrences:   1    VITAL SIGNS     Standing Status:   Standing     Number of Occurrences:   1    VITAL SIGNS     Standing Status:   Standing     Number of Occurrences:   [id number]    NURSING-MISCELLANEOUS: Comfort Care Measures CONTINUOUS     Standing Status:   Standing     Number of Occurrences:   1     Order Specific Question:   Description of Order:     Answer:   Comfort Care Measures    BLADDER CHECKS     May scan bladder PRN for urinary retention and or patient discomfort     Standing Status:   Standing     Number of Occurrences:   [id number]    NURSING ASSESSMENT:  SPECIFY Assess for GIP, routine, or respite level of care.  Q SHIFT Routine     Standing Status:   Standing     Number of Occurrences:   1     Order Specific Question:   Please describe the test or procedure you would like to order. Answer:   Assess for GIP, routine, or respite level of care.  PAIN ASSESSMENT Pain and Symptoms: Assess ever 4 hours and PRN, for GIP level of care. PRN Routine     Standing Status:   Standing     Number of Occurrences:   [id number]     Order Specific Question:   Please describe the test or procedure you would like to order. Answer:   Pain and Symptoms: Assess ever 4 hours and PRN, for GIP level of care.  WOUND CARE, DRESSING CHANGE     Wound Care:  Location: sacrum/coccyx  Stage I ([place])- Cleanse wound location with wound cleanser, pat dry and apply Cavilon Durable Barrier Cream every 12 hours and PRN if soiled. Turn every 2 hours. Assess with each nursing assessment visit. Standing Status:   Standing     Number of Occurrences:   [address], DRESSING CHANGE     Wound Care:   Location: left heel   Stage I ([place])- Cleanse wound location with wound cleanser, pat dry and apply Cavilon Durable Barrier Cream every 12 hours and PRN if soiled. Turn every 2 hours. Assess with each nursing assessment visit. Standing Status:   Standing     Number of Occurrences:   [address], DRESSING CHANGE     Wound Care:   Location: right heel   Stage I ([place])- Cleanse wound location with wound cleanser, pat dry and apply Cavilon Durable Barrier Cream every 12 hours and PRN if soiled. Turn every 2 hours. Assess with each nursing assessment visit. Standing Status:   Standing     Number of Occurrences:   62    ACTIVITY AS TOLERATED W/ASSIST     May use bedside commode     Standing Status:   Standing     Number of Occurrences:   1    NURSING-MISCELLANEOUS: admit 3/4: (Juliette) Admitted to domiciliary level of care. 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 Hospice Dx: Widely metastatic cancer of undetermined cell type and site . .. 3/4: (Juliette) Admitted to domiciliary level of care. 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 Associated Dx:  Extensive bilateral pulmonary metastases, respiratory failure with hypoxia, and metabolic encephalopathy. This is in order to admit Aureliano Juan to routine level hospice care under domiciliary status at [facility], for first 90-day benefit interval. 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  The patient has Alzheimer's disease of moderate severity without behavioral disturbance.  Staff at his retirement found him to be more confused than usual with an O2 saturation less than 90%.  After a brief hospital evaluation his daughter and durable med POA, Janette Morrow, elected to decline diagnostic biopsy because it was clear that she would not subject her father to disease modifying interventions. 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 The dramatic extent of this octogenarian's disease at presentation and its apparent rapid progression limit his expected survival to less than 1 month. Sharri Trujillo will require introduction and titration of scheduled and intermittent opioid to control somatic pain and dyspnea.  Although he cannot recall it and reported, the patient is observed to have FL AC evidence of pain with movement.  Grief remains a diagnosis contributing to his poor prognosis in spite of his memory deficits. Non Associated Dx:  DVT, CHF, Alzheimer's disease, and HTN.      Standing Status:   Standing     Number of Occurrences:   1     Order Specific Question:   Description of Order:     Answer:   admit    DO NOT RESUSCITATE     Standing Status:   Standing     Number of Occurrences:   1     Order Specific Question:   Comfort Measures Only? Answer: Yes    OXYGEN CANNULA Liters per minute: 2; Indications for O2 therapy: RESPIRATORY DISTRESS CONTINUOUS Routine     Standing Status:   Standing     Number of Occurrences:   1     Order Specific Question:   Liters per minute: Answer:   2     Order Specific Question:   Indications for O2 therapy     Answer:   RESPIRATORY DISTRESS    atenoloL (TENORMIN) 25 mg tablet     Sig: Take 25 mg by mouth daily.  DISCONTD: melatonin 5 mg tablet     Sig: Take 5 mg by mouth nightly.  DISCONTD: atenoloL (TENORMIN) tablet 25 mg (Patient Supplied)     OP SIG:Take 25 mg by mouth daily.  tamsulosin (FLOMAX) capsule 0.4 mg     OP SIG:Take 0.4 mg by mouth daily.  DISCONTD: nitroglycerin (NITRODUR) 0.4 mg/hr patch 1 Patch     OP SI Patch by TransDERmal route daily.  nitroglycerin (NITROSTAT) tablet 0.4 mg     OP SI Tab by SubLINGual route every five (5) minutes as needed for Chest Pain.  OR Linked Order Group     morphine (ROXANOL) concentrated oral syringe 5 mg     morphine (ROXANOL) concentrated oral syringe 5 mg    acetaminophen (TYLENOL) tablet 650 mg    haloperidoL (HALDOL) tablet 2 mg    diphenhydrAMINE (BENADRYL) capsule 25 mg    DISCONTD: acetaminophen (TYLENOL) tablet 650 mg    LORazepam (ATIVAN) tablet 0.5 mg    senna (SENOKOT) tablet 8.6 mg    benzonatate (TESSALON) capsule 200 mg    DISCONTD: haloperidoL (HALDOL) tablet 2 mg    alum-mag hydroxide-simeth (MYLANTA) oral suspension 30 mL    albuterol (PROVENTIL VENTOLIN) nebulizer solution 2.5 mg     Order Specific Question:   MODE OF DELIVERY     Answer:   Nebulizer    hyoscyamine SL (LEVSIN/SL) tablet 0.125 mg    LORazepam (ATIVAN) injection 2 mg    DISCONTD: morphine (ROXANOL) concentrated oral syringe 10 mg    morphine (ROXANOL) concentrated oral syringe 5 mg    escitalopram oxalate (Lexapro) 5 mg tablet     Sig: Take 5 mg by mouth daily.  lisinopril-hydroCHLOROthiazide (Zestoretic) 20-12.5 mg per tablet     Sig: Take 2 Tabs by mouth daily.  memantine ER (NAMENDA XR) 28 mg capsule     Sig: Take 28 mg by mouth daily.  bisacodyL (DULCOLAX) suppository 10 mg    INITIAL PHYSICIAN ORDER: HOSPICE Level Of Care: Routine; Reason for Admission: new onset widely extensive bilateral pulmonary metastatic cncer of unknown primary, dyspnea, incident chest/back pain, metabbolic encephalopathy     Standing Status:   Standing     Number of Occurrences:   1     Order Specific Question:   Status     Answer:   Hospice     Order Specific Question:   Level Of Care     Answer:   Routine     Order Specific Question:   Reason for Admission     Answer:   new onset widely extensive bilateral pulmonary metastatic cncer of unknown primary, dyspnea, incident chest/back pain, metabbolic encephalopathy     Order Specific Question:   Inpatient Hospitalization Certified Necessary for the Following Reasons     Answer:   9.  Other (further clarification in H&P documentation)     Order Specific Question:   Admitting Diagnosis     Answer:   Metastasis to lung of unknown origin, unspecified laterality [facility]) [[id number]]     Order Specific Question:   Terminal Prognosis Diagnosis(es)     Answer:   Respiratory failure with hypoxia [facility]) [[id number]]     Order Specific Question:   Terminal Prognosis Diagnosis(es)     Answer:   Cancer associated pain [[id number]]     Order Specific Question:   Terminal Prognosis Diagnosis(es)     Answer:   Dyspnea and respiratory abnormalities [[id number]]     Order Specific Question:   Terminal Prognosis Diagnosis(es)     Answer:   Cough in adult patient [[id number]]     Order Specific Question:   Terminal Prognosis Diagnosis(es)     Answer:   Fatigue [[id number]]     Order Specific Question:   Terminal Prognosis Diagnosis(es)     Answer:   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 [facility]) [[id number]]     Order Specific Question: Terminal Prognosis Diagnosis(es)     Answer:   BPH (benign prostatic hyperplasia) [[id number]]     Order Specific Question:   Terminal Prognosis Diagnosis(es)     Answer:   DVT femoral (deep venous thrombosis) with thrombophlebitis, left [facility]) [[id number]]     Order Specific Question:   Terminal Prognosis Diagnosis(es)     Answer:   Alzheimer's dementia without behavioral disturbance [facility]) [[id number]]     Order Specific Question:   Terminal Prognosis Diagnosis(es)     Answer:   PVZLO [[id number]]     Order Specific Question:   Admitting Physician     Answer:   [facility]     Order Specific Question:   Attending Physician     Answer:   [facility]     Order Specific Question:   Discharge Plan:     Answer:   Home with Home Hospice    IP CONSULT TO SOCIAL WORK     Standing Status:   Standing     Number of Occurrences:   1     Order Specific Question:   Reason for Consult: Answer: For [facility] Only. For contracted patients, their primary hospice will continue to manage social work needs. Allergies:  No Known Allergies    Care Plan:  Multidisciplinary Problems (Active)     Problem: Anticipatory Grief     Dates: Start: 03/05/21       Disciplines: Interdisciplinary    Goal: Grief heard and acknowledged, anxiety reduced, patient coping identified, patient/family expressed gratitude     Dates: Start: 03/05/21   Expected End: 03/20/21       Description: Patient / Family will acknowledge feelings  of grief and anxiety and utilize available support to help them cope throughout their grief journey. Disciplines: Interdisciplinary    Intervention: Assess grief responses     Dates: Start: 03/05/21       Description: [address] [place] [address] will assess for grief reactions with each visit. Intervention: Support grieving process     Dates: Start: 03/05/21       Description: [address] [place] [address] will provide support by providing a safe space for nonjudgmental communication and grief education. Problem: Anticipatory Grief     Dates: Start: 03/05/21       Disciplines: Interdisciplinary    Goal: Explore Reactions To and Verbalize Acceptance of Impending Loss     Dates: Start: 03/05/21   Expected End: 04/16/21       Description: Patient/family/caregiver will explore reactions to and verbalize acceptance of impending loss. Disciplines: Interdisciplinary    Intervention: Assess grief responses     Dates: Start: 03/05/21       Description: Assess for feelings of grief          Intervention: Instruct on grief process and coping strategies     Dates: Start: 03/05/21       Description: Instruct patient/caregiver on the grief process and effective coping strategies            Intervention: Provide grief counseling/education     Dates: Start: 03/05/21       Description: Provide patient/caregiver grief counseling and educate on community resources          Intervention: Refer to grief support in community     Dates: Start: 03/05/21       Description: Refer patient/caregiver to community resources for grief support                      Problem: Anticipatory Grief     Dates: Start: 03/12/21       Disciplines: Interdisciplinary    Goal: Grief heard and acknowledged, anxiety reduced, patient coping identified, patient/family expressed gratitude     Dates: Start: 03/12/21   Expected End: 03/20/21       Disciplines: Interdisciplinary    Intervention: Assess grief responses     Dates: Start: 03/12/21       Description: Assess for feelings of grief          Intervention: Support grieving process     Dates: Start: 03/12/21       Description: Address feelings of loss, anticipatory grief, expression of concern.                       Problem: Anxiety/Agitation     Dates: Start: 03/04/21       Disciplines: Interdisciplinary    Goal: Verbalize and demonstrate ability to manage anxiety     Dates: Start: 03/04/21   Expected End: 03/20/21       Description: The patient's agitation/ anxiety will be controlled during shift aeb relaxed body and facial features and lack of trying to get out of bed. Medications:    Haldol 2 mg po q 1 pr prn  Ativan 0.5 mg po q 6 hr prn    Disciplines: Interdisciplinary    Intervention: Assess for anxiety/agitation     Dates: Start: 03/04/21       Description: Assess for signs and symptoms of anxiety and agitation. Intervention: Instruction strategies to reduce anxiety/agitation     Dates: Start: 03/04/21       Description: Instruct patient/caregiver on strategies to reduce anxiety/agitation. Problem: Breathing Pattern - Ineffective     Dates: Start: 03/04/21       Disciplines: Nurse, Interdisciplinary, RT    Goal: *PALLIATIVE CARE:  Alleviation of Dyspnea     Dates: Start: 03/04/21   Expected End: 03/20/21       Disciplines: Nurse, Interdisciplinary, RT    Intervention: Refer patient for holistic services per facility     Dates: Start: 03/04/21                         Problem: Coping and Emotional Distress     Dates: Start: 03/05/21       Disciplines: Interdisciplinary    Goal: Demonstrate Acceptance of Terminal Illness and Disease Progression     Dates: Start: 03/05/21   Expected End: 03/20/21       Description: Patient/family/caregiver will demonstrate acceptance of terminal disease and understanding of disease progression while employing appropriate mechanisms.     Disciplines: Interdisciplinary    Intervention: Assess emotional status and coping mechanisms     Dates: Start: 03/05/21       Description: Assess patient/caregiver emotional status and coping mechanisms            Intervention: Assess family's level of coping     Dates: Start: 03/05/21             Intervention: Instruct on effective coping strategies     Dates: Start: 03/05/21       Description: Patient/family/caregiver will demonstrate acceptance of terminal disease and understanding of disease progression while employing coping mechanisms                      Problem: End of Life Process     Dates: Start: 03/04/21       Disciplines: Interdisciplinary    Goal: Demonstrate understanding of end of life processes     Dates: Start: 03/04/21   Expected End: 03/20/21       Description: Radha People will understand end of life processes. Disciplines: Interdisciplinary    Intervention: Assess for signs/symptoms of terminal restlessness     Dates: Start: 03/04/21             Intervention: Amado Ramirez on strategies to reduce terminal restlessness     Dates: Start: 03/04/21             Intervention: Instruct on the dying process     Dates: Start: 03/04/21             Intervention: Instruct: imminent death     Dates: Start: 03/04/21             Intervention: Instruct: process at end of life     Dates: Start: 03/04/21                         Problem: Falls - Risk of     Dates: Start: 03/04/21       Description: Pt will remain free from falls aeb no injuries while at [facility]. Disciplines: Interdisciplinary    Goal: *Absence of Falls     Dates: Start: 03/04/21   Expected End: 03/20/21       Description: Patient will not have any falls or injuries during shift . Disciplines: Interdisciplinary                Problem: Impaired Family Dynamics     Dates: Start: 03/05/21       Disciplines: Interdisciplinary    Goal: Demonstrate Normal or Improving Interpersonal Relationships     Dates: Start: 03/05/21   Expected End: 03/20/21       Description: Patient/family/caregiver will demonstrate normal or improving interpersonal relationships. Disciplines: Interdisciplinary    Intervention: Assess family/friend/caregiver dynamics     Dates: Start: 03/05/21       Description: Assess pattern of relating and interactions between family members, friends, and caregivers.           Intervention: Collaborate with  regarding alterations in family dynamics     Dates: Start: 03/05/21       Description: Collaborate with  for alterations identified in family dynamics                Goal: Demonstrate normal or improving family dynamics with regards to patient's terminal illness     Dates: Start: 03/05/21   Expected End: 03/20/21       Description: Demonstrate normal or improving family dynamics with regards to patient's terminal illness    Disciplines: Interdisciplinary    Intervention: Assess family dynamics     Dates: Start: 03/05/21       Description: Assess family pattern of relating and interactions between family members          Intervention: Collaborate with  regarding alterations in family dynamics     Dates: Start: 03/05/21       Description: Collaborate with  for alterations identified in family dynamics                      Problem: Pain     Dates: Start: 03/04/21       Description: Pt will remain free from pain aeb pain score or flacc less than 4 while at [facility]. Medications:  Roxanol 5 mg po scheduled q 6 hrs  Roxanol 5 mg po q 1 hr prn      Disciplines: Interdisciplinary    Goal: Verbalize satisfaction of level of comfort and symptom control     Dates: Start: 03/04/21   Expected End: 03/20/21       Description: Pt will remain free from pain aeb pain score or flacc less than 4 while at [facility].       Medications:  Roxanol 5 mg po scheduled q 6 hrs  Roxanol 5 mg po q 1 hr prn       Disciplines: Interdisciplinary    Intervention: Assess effectiveness of pain management     Dates: Start: 03/04/21             Intervention: Assess for signs/symptoms of acute pain     Dates: Start: 03/04/21             Intervention: Assess for signs/symptoms of chronic pain     Dates: Start: 03/04/21             Intervention: Basil Hernandez on non-pharmacological pain management     Dates: Start: 03/04/21             Intervention: Instruct on pain scales     Dates: Start: 03/04/21             Intervention: Instruct on pharmacological pain management     Dates: Start: 03/04/21                         Problem: Patient/Family/Caregiver Has Conflict     Dates: Start: 03/05/21       Disciplines: Interdisciplinary    Goal: Verbalize feelings on relationships, problems, and/or fears     Dates: Start: 03/05/21   Expected End: 03/20/21       Description: The patient will verbalize feelings on relationships, problems and/or fears. Disciplines: Interdisciplinary    Intervention: Encourage verbalization of feelings, perceptions, fears, stressors     Dates: Start: 03/05/21       Description: Encourage patient/caregiver to verbalize feelings, perceptions, fears, and stressors. Intervention: Include family/caregiver in decisions related to psychosocial needs     Dates: Start: 03/05/21       Description: Include family/caregiver in decisions related to healthcare decisions, managing illness, coping mechanisms, financial assistance, and transportation needs                Goal: Verbalize understanding of physical changes and the reality of dying process     Dates: Start: 03/05/21   Expected End: 03/20/21       Description: The patient/family/caregiver will verbalize understanding of physical changes and the reality of the dying process. Disciplines: Interdisciplinary    Intervention: Assess impact of loss upon self and others     Dates: Start: 03/05/21       Description: Assess impact of loss upon the patient/caregiver          Intervention: Encourage verbalization of feelings, perceptions, fears, stressors     Dates: Start: 03/05/21       Description: Encourage patient/caregiver to verbalize feelings, perceptions, fears, and stressors.           Intervention: Include family/caregiver in decisions related to psychosocial needs     Dates: Start: 03/05/21       Description: Include family/caregiver in decisions related to healthcare decisions, managing illness, coping mechanisms, financial assistance, and transportation needs                      Problem: Pressure Injury - Risk of     Dates: Start: 03/04/21       Description: Pt will remain free from/ any further pressure injuries aeb no/progression pressure sores while at [facility]. Disciplines: Interdisciplinary    Goal: *Prevention of pressure injury     Dates: Start: 03/04/21   Expected End: 03/20/21       Description: Patient will not develop any pressure injuries during stay. Disciplines: Interdisciplinary                Problem: Psychosocial General     Dates: Start: 03/05/21       Disciplines: Interdisciplinary    Goal: Verbalize factors contributing to need for treatment and voice changes that will be made to prevent hospitalization     Dates: Start: 03/05/21   Expected End: 03/20/21       Disciplines: Interdisciplinary    Intervention: Identify psychosocial factors     Dates: Start: 03/05/21       Description: On SOC and PRN encourage patient/caregiver to identify factors in the patient's that have contributed to the need for treatment (e.g. Living situation, relationships, drug or alcohol use, inadequate coping mechanisms). Discuss each contributing factor, how the patient sees these now, and what can be changed, what the patient is motivated to change, and how the patient will deal differently with these things to prevent rehospitalization.                         Care Plan Problems/Goals      Progressing Towards Goal (15)      *Absence of Falls (Falls - Risk of)    Disciplines:  Interdisciplinary Expected end:  03/20/21        Outcome: Progressing Towards Goal By Linette Strong RN on 03/19/21 8385            *Prevention of pressure injury (Pressure Injury - Risk of)    Disciplines:  Interdisciplinary Expected end:  03/20/21        Outcome: Progressing Towards Goal By Linette Strong RN on 03/19/21 0195            Verbalize satisfaction of level of comfort and symptom control (Pain)    Disciplines:  Interdisciplinary Expected end:  03/20/21        Outcome: Progressing Towards Goal By Linette Strong RN on 03/19/21 6971            Verbalize and demonstrate ability to manage anxiety (Anxiety/Agitation)    Disciplines:  Interdisciplinary Expected end:  03/20/21        Outcome: Progressing Towards Goal By Lenita Phoenix, RN on 03/18/21 1306            Demonstrate understanding of end of life processes (End of Life Process)    Disciplines:  Interdisciplinary Expected end:  03/20/21        Outcome: Progressing Towards Goal By Willem Herbert RN on 03/16/21 0555            *PALLIATIVE CARE:  Alleviation of Dyspnea (Breathing Pattern - Ineffective)    Disciplines:  Nurse, Interdisciplinary, RT Expected end:  03/20/21        Outcome: Progressing Towards Goal By Ivelisse Walls RN on 03/19/21 4213            Grief heard and acknowledged, anxiety reduced, patient coping identified, patient/family expressed gratitude (Anticipatory Grief)    Disciplines:  Interdisciplinary Expected end:  03/20/21        Outcome: Progressing Towards Goal By Juan C Moe on 03/19/21 1218            Demonstrate Acceptance of Terminal Illness and Disease Progression (Coping and Emotional Distress)    Disciplines:  Interdisciplinary Expected end:  03/20/21        Outcome: Progressing Towards Goal By Phelps President on 03/13/21 0148            Explore Reactions To and Verbalize Acceptance of Impending Loss (Anticipatory Grief)    Disciplines:  Interdisciplinary Expected end:  04/16/21        Outcome: Progressing Towards Goal By Kelley President on 03/14/21 0458            Demonstrate Normal or Improving Interpersonal Relationships (Impaired Family Dynamics)    Disciplines:  Interdisciplinary Expected end:  03/20/21        Outcome: Progressing Towards Goal By Kelley President on 03/14/21 0458            Demonstrate normal or improving family dynamics with regards to patient's terminal illness (Impaired Family Dynamics)    Disciplines:  Interdisciplinary Expected end:  03/20/21        Outcome: Progressing Towards Goal By Phelps President on 03/14/21 0458            Verbalize feelings on relationships, problems, and/or fears (Patient/Family/Caregiver Has Conflict)    Disciplines:  Interdisciplinary Expected end:  03/20/21        Outcome: Progressing Towards Goal By Deandre Kee on 03/13/21 5172            Verbalize understanding of physical changes and the reality of dying process (Patient/Family/Caregiver Has Conflict)    Disciplines:  Interdisciplinary Expected end:  03/20/21        Outcome: Progressing Towards Goal By Deandre Kee on 03/13/21 5841            Verbalize factors contributing to need for treatment and voice changes that will be made to prevent hospitalization (Psychosocial General)    Disciplines:  Interdisciplinary Expected end:  03/20/21        Outcome: Progressing Towards Goal By Deandre Kee on 03/15/21 0716            Grief heard and acknowledged, anxiety reduced, patient coping identified, patient/family expressed gratitude (Anticipatory Grief)    Disciplines:  Interdisciplinary Expected end:  03/20/21        Outcome: Progressing Towards Goal By Elsa Feliciano on 03/19/21 1220                         Resolved/Met (3)      Patient/Family Education (Patient Education: Go to Patient Education Activity)    Disciplines:  Interdisciplinary Expected end:  -        Outcome: Resolved/Met By Herminia Wisdom RN on 03/07/21 9350            Patient/Family Education (Patient Education: Go to Patient Education Activity)    Disciplines:  Interdisciplinary Expected end:  -        Outcome: Resolved/Met By Herminia Wisdom RN on 03/07/21 7049            Demonstrate understanding of hospice philosophy, plan of care, and home hospice program [facility] Orientation)    Disciplines:  Interdisciplinary Expected end:  03/13/21        Outcome: Resolved/Met By Herminia Wisdom RN on 03/07/21 0749                              ___________________    Care Team Notes          POC/IDG Notes      [facility] IDG Nurse Notes by David Batista, RN at 03/19/21 3228  Version 1 of 1    Author: David Batista RN Service: NURSING Author Type: Registered Nurse    Filed: 03/19/21 1638 Date of Service: 03/19/21 1638 Status: Signed    : Regina Cabrera RN (Registered Nurse)       Patient: Shamar Martins    Date: 03/19/21  Time: 4:38 PM    [facility] Nurse Notes  Follow Up IDG: Pt is a 81year old male with Cancer of unknown origin who is here DOM level of care for management of pain. Incontinent and voiding x 4 episodes   IV access: None   PO intake: Eating 25-30% of meals  Oxygen: 2/L  Wounds: Stage I Sacrum/Coccyx L and R heel Stage I  PRN medications: Ativan 0.5 mg x 1 dose for anxiety   Scheduled meds:  Roxinol 5mg Q 6 hours / Flomax 0.4mg QD/ Senna 8.6mg QD  Plan: Comprehensive plan of care reviewed. IDG and pt./family in agreement with plan of care. The IDG identifies through on-going assessment when a change is needed to the POC; the pt/family will receive care and services necessitated by changes in POC. Medications reviewed by the pharmacist and Medical Director. Signed by: Kezia Pantoja RN       [address] [facility]  Notes by Michelle Stewart at 03/19/21 1215  Version 1 of 1    Author: Tennis Beets Service: Hospice and Palliative Care Author Type: Pastoral Care    Filed: 03/19/21 1217 Date of Service: 03/19/21 1215 Status: Signed    : Tennis Beets (Pastoral Care)       Patient: Shamar Martins    Date: 03/19/21  Time: 12:15 PM    [facility]  Notes    Patient admitted to hospice house on 3/4/21. Spiritual care and support to be provided as needed, requested, or referred. Signed by: Helena Fairbanks       [address] [facility]  Notes by Michael Ford at 03/19/21 1212  Version 1 of 1    Author: Michael Ford Service: Hospice and Palliative Care Author Type:     Filed: 03/19/21 1214 Date of Service: 03/19/21 1212 Status: Signed    : Michael Ford ()       Pt remains under domilcilary level of care. No changes in the plan of care.  SW will continue to provide ongoing emotional support and assessment of psychosocial and bereavement concerns and needs until discharge. MSW has reviewed and supervised the plan of care. 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 Incontinent and voiding x 5    IV access: None   PO intake: Eating well  Oxygen: Room Air  Wounds: Stage I sacral / Bilateral heel stage I  PRN medications: Haldol 2mg x 2 doses for agitation / Roxinol 5mg x1 dose for pain / Ativan 0.5mg x 1 dose for anxiety   Scheduled meds:  Roxinol 5mg q 6 hours / Senokot 8.6mg BID / Flomax 0.4mg QD  Plan: Comprehensive plan of care reviewed. IDG and pt./family in agreement with plan of care. The IDG identifies through on-going assessment when a change is needed to the POC; the pt/family will receive care and services necessitated by changes in POC. Medications reviewed by the pharmacist and Medical Director. Signed by: Hermilo Mireles RN       [address] IDG  Notes by Areli Powell at 03/12/21 1236  Version 1 of 1    Author: Areli Powell Service: Spiritual Care Author Type: Pastoral Care    Filed: 03/12/21 1249 Date of Service: 03/12/21 1236 Status: Signed    : Areli Powell (Pastoral Care)       Patient: Liseth Leo    Date: 03/12/21  Time: 12:36 PM    [facility]  Notes  Intervention: Initial assessments for spiritual and bereavement care completed. Care Plan Initiated. Ministry of presence, prayer and family support. Outcome:  Patient pleasant. Family grieving appropriately the passing of Ms. Abbi Jan 8. Referral to Bereavement. Plan: Continue to provide spiritual and bereavement. Signed by: Georgie Mahmood       [facility] IDG  Notes by Deandra Hernández LMSW at 03/12/21 1226  Version 1 of 1    Author: Deandra Hernández LMSW Service: Licensed Clinical  Author Type: Licensed Masters in Social Work    Filed: 03/12/21 1227 Date of Service: 03/12/21 1226 Status: Signed    : Deandra Hernández LMSW (Licensed Masters in Social Work)       No change in current care plan. SW to continue to follow for needs and support. [facility] IDG  Notes by Teena Clifford LMSW at 03/08/21 1122  Version 1 of 1    Author: Teena Clifford LMSW Service: Licensed Clinical  Author Type:     Filed: 03/08/21 1122 Date of Service: 03/08/21 1122 Status: Signed    : Teena Clifford LMSW ()       SW to assess coping and needs each visit and offer availability. 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 Voiding x 2   IV access: None   PO intake: Eating bites and liquids with a syringe  Oxygen:2/L  Wounds: Stage I Sacrum / Coccyx, Bilateral Heels Stage I  PRN medications: None   Scheduled meds:  Tenormin 25mg QD / Roxanol 10mg Q8 / Nitroderm 0.4mg patch / Senokot 8.6mg BID / Flomax 0.4mg QD  Plan: Comprehensive plan of care reviewed. IDG and pt./family in agreement with plan of care.  The IDG identifies through on-going assessment when a change is needed to the [address]; the pt/family will receive care and services necessitated by changes in POC. Medications reviewed by the pharmacist and Medical Director. Signed by: Maliha Donaldson RN       [address] [place] [address] ID  Notes by Meme Prieto at 03/05/21 0935  Version 1 of 1    Author: Meme Prieto Service: Spiritual Care Author Type: Pastoral Care    Filed: 03/05/21 1236 Date of Service: 03/05/21 0935 Status: Signed    : Meme Prieto (Pastoral Care)       Patient: Marissa Al    Date: 03/05/21  Time: 9:35 AM    [facility]  Notes    Assessment pending for spiritual and bereavement support. Signed by: Avtar French       [address] [place] [address]  Notes by Evelina Ireland LMSW at 03/05/21 1045  Version 1 of 1    Author: Evelina Ireland LMSW Service: Licensed Clinical  Author Type:     Filed: 03/05/21 1045 Date of Service: 03/05/21 1045 Status: Signed    : Evelina Ireland LMSW ()       SW to assess coping and needs each visit and offer availability. [address] [place] [address] ID  Notes by Lenin Canada LMSW at 03/05/21 1003  Version 1 of 1    Author: Lenin Canada LMSW Service: Licensed Clinical  Author Type: Licensed Masters in Social Work    Filed: 03/05/21 1004 Date of Service: 03/05/21 1003 Status: Signed    : Lenin Canada LMSW (Licensed Masters in Social Work)       Rivera Hinton has reviewed the initial  Comprehensive RN assessment and POC and agrees       [address] [place] [address] IDG  Notes by Meme Prieto at 03/05/21 0845  Version 1 of 1    Author: Meme Prieto Service: Spiritual Care Author Type: Pastoral Care    Filed: 03/05/21 0846 Date of Service: 03/05/21 0845 Status: Signed    : Meme Prieto (Pastoral Care)       Patient: Marissa Al    Date: 03/05/21  Time: 8:45 AM    [facility]  Notes  / Grief Counselor has reviewed  Initial Comprehensive Assessment and plan of care.  Bereavement and Spiritual Care Assessments to be completed and plan of care put in place to meet the needs, requests and referrals. 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 His level of care is domiciliary. Pt is alert but confused not able to state her name or date of birth. Pt arrived incontinent of urine, urine when nurse with assistant attempted to do incontinent care pt hollered, cursed and resisted attempts to turn him. Physical assessment completed. Lung sounds clear, oxygen on at 2 liters per nasal cannula, heart sounds slow with irregular heart rate, abdomen soft with active bowel sounds, pt incontinent of clear yellow urine. Extremities cool with palpable pulses. Pt has stage 1 to sacrum and bilateral heels. Did incontinent care, applied brief and elevated extremities on pillow. Applied tab alerts x 2 and bed alarm under pt.        Signed by: Pat Zendejas RN                Care Team Present:   Team Members Present: Physician, Nurse, , , Other (Comment)  Physician Team Member: Dr. Cristina Garcia  Nurse Team Member: Wesley Loomis  Social Work Team Member: Melinda Hollis   Team Member: Sharath Ragsdale  Other Discipline Present (Name):  Kana Rios NP

## 2021-03-19 NOTE — PROGRESS NOTES
Problem: Anticipatory Grief  Goal: Grief heard and acknowledged, anxiety reduced, patient coping identified, patient/family expressed gratitude  Description: Patient / Family will acknowledge feelings  of grief and anxiety and utilize available support to help them cope throughout their grief journey. Outcome: Progressing Towards Goal     Problem: Anticipatory Grief  Goal: Grief heard and acknowledged, anxiety reduced, patient coping identified, patient/family expressed gratitude  Description: Patient / Family will acknowledge feelings  of grief and anxiety and utilize available support to help them cope throughout their grief journey.    Outcome: Progressing Towards Goal

## 2021-03-19 NOTE — [facility] IDG NURSE NOTES
Patient: Lio Dc    Date: 03/19/21  Time: 4:38 PM    [facility] Nurse Notes  Follow Up IDG: Pt is a 81year old male with Cancer of unknown origin who is here DOM level of care for management of pain. Incontinent and voiding x 4 episodes   IV access: None   PO intake: Eating 25-30% of meals  Oxygen: 2/L  Wounds: Stage I Sacrum/Coccyx L and R heel Stage I  PRN medications: Ativan 0.5 mg x 1 dose for anxiety   Scheduled meds:  Roxinol 5mg Q 6 hours / Flomax 0.4mg QD/ Senna 8.6mg QD  Plan: Comprehensive plan of care reviewed. IDG and pt./family in agreement with plan of care. The IDG identifies through on-going assessment when a change is needed to the POC; the pt/family will receive care and services necessitated by changes in POC. Medications reviewed by the pharmacist and Medical Director.           Signed by: Komal Balderas RN

## 2021-03-19 NOTE — [facility] IDG SOCIAL WORKER NOTES
Pt remains under domilcilary level of care. No changes in the plan of care. SW will continue to provide ongoing emotional support and assessment of psychosocial and bereavement concerns and needs until discharge. MSW has reviewed and supervised the plan of care.

## 2021-03-19 NOTE — [facility] IDG CHAPLAIN NOTES
Patient: Moira Valera    Date: 03/19/21  Time: 12:15 PM    [facility]  Notes    Patient admitted to hospice house on 3/4/21. Spiritual care and support to be provided as needed, requested, or referred.         Signed by: Saúl Thomas

## 2021-03-19 NOTE — PROGRESS NOTES
[address] Report received from Lauri Dobbins RN. Pt is resting with his eyes closed, no signs or symptoms of distress or pain. Bed is low and locked, call bell within reach, tab alert in place, side rails up x 3. Door remains open for continued monitoring. 0 Pt's son was at the bedside and is now leaving stating he didn't want to wake up his father. Son states that there are a lot of visitors planning to visit the patient this weekend. 1700 CNA at the bedside feeding patient dinner. Pt's brief was changed and he was repositioned for comfort. Barrier cream applied to reddened buttock, no skin breakdown. Safety measures in place. 1800 Pt is resting with his eyes closed, no signs or pain or distress. Safety measures in place. Door remains open for continued monitoring. Report given to Jean Pierre Jackson RN.

## 2021-03-20 NOTE — PROGRESS NOTES
1900-Report received from Valarie James RN. Patient identified by name and . Patient resting quietly in bed with eyes closed.  No signs or symptoms of distress, pain, agitation/anxiety, or SOB noted at present. Bed locked and in lowest position, side rails up x 2, call bell within reach, tab alarm in place. No family present at bedside. Door open for continuous monitoring.    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 Patient had been GIP level of care on admission and is now DOM level of care. It is reported that patient continues to be confused. Patient resting in bed with eyes closed. Patient appetite remains fair. FLACC 0.    2326-Patient resting in bed with eyes opened. Patient confused but calm. Brief checked. No incontinence seen at this time. Scheduled Roxanol 5mg PO given at this time. Patient tolerated well. FLACC 0.    0210-Patient laying in bed with eyes opened and pulling at brief. Incontinence noted. Void x 1. Incontinence care completed. Barrier cream applied to sacrum. Patient continues to be confused and conversing incoherently. Staff nurse assisted primary nurse with turning and repositioning patient. No other needs noted. FLACC 0.    0517-Patient resting quietly in bed with eyes closed. Respirations unlabored. No immediate needs seen. FLACC 0.    0610-Patient resting quietly in bed with eyes closed, but easily awaken by nurse voice and touch. Scheduled Roxanol 5mg PO given at this time. Incontinence noticed. [facility] care completed and repositioning with help of staff nurse. No other needs seen. FLACC 0.    0633-Patient resting in bed with eyes closed after previous dose of scheduled Roxanol. FLACC 0.    Report given to Valarie James RN

## 2021-03-20 NOTE — PROGRESS NOTES
Problem: Falls - Risk of  Goal: *Absence of Falls  Description: Patient will not have any falls or injuries during shift . Outcome: Progressing Towards Goal  Note: Fall Risk Interventions:  Mobility Interventions: Bed/chair exit alarm    Mentation Interventions: Bed/chair exit alarm, Door open when patient unattended, Reorient patient, Room close to nurse's station    Medication Interventions: Bed/chair exit alarm    Elimination Interventions: Bed/chair exit alarm, Call light in reach              Problem: Pressure Injury - Risk of  Goal: *Prevention of pressure injury  Description: Patient will not develop any pressure injuries during stay. Outcome: Progressing Towards Goal  Note: Pressure Injury Interventions:  Sensory Interventions: Assess changes in LOC, Float heels, Keep linens dry and wrinkle-free    Moisture Interventions: Absorbent underpads, Apply protective barrier, creams and emollients, Assess need for specialty bed, Check for incontinence Q2 hours and as needed    Activity Interventions: Pressure redistribution bed/mattress(bed type)    Mobility Interventions: Float heels, HOB 30 degrees or less    Nutrition Interventions: Document food/fluid/supplement intake    Friction and Shear Interventions: Apply protective barrier, creams and emollients, Minimize layers                Problem: Pain  Goal: Verbalize satisfaction of level of comfort and symptom control  Description: Pt will remain free from pain aeb pain score or flacc less than 4 while at [facility]. Medications:  Roxanol 5 mg po scheduled q 6 hrs  Roxanol 5 mg po q 1 hr prn     Outcome: Progressing Towards Goal     Problem: Anxiety/Agitation  Goal: Verbalize and demonstrate ability to manage anxiety  Description: The patient's agitation/ anxiety will be controlled during shift aeb relaxed body and facial features and lack of trying to get out of bed.      Medications:    Haldol 2 mg po q 1 pr prn  Ativan 0.5 mg po q 6 hr prn  Outcome: Progressing Towards Goal     Problem: End of Life Process  Goal: Demonstrate understanding of end of life processes  Description: Patient/caregiver will understand end of life processes.   Outcome: Progressing Towards Goal     Problem: Breathing Pattern - Ineffective  Goal: *PALLIATIVE CARE:  Alleviation of Dyspnea  Outcome: Progressing Towards Goal     Problem: Anticipatory Grief  Goal: Grief heard and acknowledged, anxiety reduced, patient coping identified, patient/family expressed gratitude  Outcome: Progressing Towards Goal

## 2021-03-20 NOTE — PROGRESS NOTES
Walking rounds completed with Vicky Valadez RN. Pt identified by name and .  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 Pt is alert with confusion. Complete care. Incontinent of b/b. o2 @2l/min PRN. Pt resting in bed with eyes closed; no signs of pain or distress noted. RR non labored. Bed in lowest and locked position with siderails x2; call light within reach and tab alert in place. FLACC 0/10.      Pt resting calmly in bed with eyes closed; no pain noted. RR non labored. No agitation noted. No NVD or SOB. FLACC 0/10.      0006 Scheduled medication given per orders. Pt resting comfortably with eyes closed; no pain noted. RR non labored. No signs of agitation, NVD, or SOB noted. Pt repositioned for comfort and skin integrity. FLACC 0/10.      0122 Pt awake in bed; no signs of pain or agitation noted. Pt warching TV. RR even and non labored. No s/sx of NVD or SOB. FLACC 0/10.      0324 Pt quietly watching TV in bed. RR non labored. No signs of agitation, NVD, or SOB noted. FLACC 0/10.      0544  Pt resting calmly with eyes closed; no pain noted. RR non labored. No signs of agitation, NVD, or SOB noted. Pt repositioned for comfort and skin integrity. FLACC 0/10.       Pt attempting to get out of bed and taking off his gown. Pt restless and agitated. PRN Lorazepam given to manage anxiety. Scheduled Roxanol given per orders. No s/sx of NVD or SOB. FLACC 010.       Pt resting calmly with eyes closed; no pain noted. RR non labored. No signs of agitation, NVD, or SOB noted. Pt repositioned for comfort and skin integrity.  FLACC 0/10.

## 2021-03-20 NOTE — PROGRESS NOTES
9429 Report received from [facility], RN. 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 Pt is currently resting in bed with his eyes closed. No signs or agitation or pain. Bed is low and locked, call bell in reach, tab alerts in place, bed alarm in place and on. Side rails up x3. Door remains open for continued monitoring. 0815 Pt assisted with breakfast by CNA. Safety measures in place. 0915 Pt medicated with scheduled meds. Pt able to take PO beds with pudding. Pt's wet brief was changed and he was repositioned for comfort. Pt stating that he sees his mother and Jani. Safety measures in place. Door remains open for continued monitoring. 1230 Pt continues to rest with his eyes closed. No signs or symptoms of restlessness or agitation. Safety measures in place. Door remains open for continued monitoring. [address] Family at the bedside, safety measures in place. Door remains closed while family visiting. 1700 Pt taking small bites of dinner, pt confused and speech does not always make sense. Safety measures in place. 1800 Pt's brief changed and pt was repositioned for comfort. Safety measures in place. Door remains open for continued monitoring. Report given to Iona Solorzano RN.

## 2021-03-21 NOTE — PROGRESS NOTES
Problem: Falls - Risk of  Goal: *Absence of Falls  Description: Patient will not have any falls or injuries during shift . Outcome: Progressing Towards Goal  Note: Fall Risk Interventions:  Mobility Interventions: Bed/chair exit alarm    Mentation Interventions: Adequate sleep, hydration, pain control, Bed/chair exit alarm, Door open when patient unattended    Medication Interventions: Bed/chair exit alarm    Elimination Interventions: Bed/chair exit alarm, Call light in reach              Problem: Pressure Injury - Risk of  Goal: *Prevention of pressure injury  Description: Patient will not develop any pressure injuries during stay.   Outcome: Progressing Towards Goal  Note: Pressure Injury Interventions:  Sensory Interventions: Assess changes in LOC, Float heels, Keep linens dry and wrinkle-free, Minimize linen layers, Pressure redistribution bed/mattress (bed type)    Moisture Interventions: Absorbent underpads, Maintain skin hydration (lotion/cream), Minimize layers    Activity Interventions: Pressure redistribution bed/mattress(bed type), Assess need for specialty bed    Mobility Interventions: Float heels, HOB 30 degrees or less, Pressure redistribution bed/mattress (bed type)    Nutrition Interventions: Document food/fluid/supplement intake    Friction and Shear Interventions: Apply protective barrier, creams and emollients, HOB 30 degrees or less, Minimize layers                Problem: Breathing Pattern - Ineffective  Goal: *PALLIATIVE CARE:  Alleviation of Dyspnea  Outcome: Progressing Towards Goal

## 2021-03-21 NOTE — PROGRESS NOTES
190-Report received from Brianna Jensen RN. Patient identified by name and . Patient resting quietly in bed with eyes opened. Patient alert but confused. No signs or symptoms of distress, pain, agitation/anxiety, or SOB noted at present. Bed locked and in lowest position, side rails up x 2, call bell within reach, tab alarm in place. No family present at bedside. Door open for continuous monitoring. -Patient admitted to [facility] on 3/4/2021 with a terminal diagnosis of primary cancer of unknown site and cell type. Patient had been GIP level of care on admission and is now DOM level of care. It is reported that patient continues to be confused. Patient resting in bed with eyes opened with in coherent conversations. Patient appetite remains fair. FLACC 0.    0001-Scheduled Roxanol 5mg PO given at this time. Patient tolerated well. Patient continues to be alert but confused. No command following. Incontinence noted. Void x 1. Incontinence care completed and patient repositioned to supine. No other needs seen. FLACC 0.    0126-PRN Ativan 0.5mg PO crushed and give with water via syringe due to patient yelling out and pulling and kicking covers off. Patient alert and appearing anxious. PRN Dulcolax supp 10mg given for constipation. Patient repositioned and HOB elevated. TV turned back on for relaxation and distraction. 0247-Nurse entered patient's room. Patient laying horizontal in bed. Bowel movement noted in brief. Patient repositioned and incontinence care completed with help of staff nurse. Patient moaning aloud with each turn. PRN Roxanol 5mg PO given for pain. 0348-Patient resting in bed with eyes opened continuing to converse incoherently after previous dose of PRN medications. 0528-Patient resting quietly in bed with eyes closed. Respirations unlabored. FLACC 0.    0604-Scheduled Roxanol 5mg PO given at this time. Incontinence care completed. Void x 1. No other needs seen. FLACC 0.     Report given to Denzel Potter, RAJENDRA

## 2021-03-21 NOTE — PROGRESS NOTES
5257 Report received from Robel Rae RN. 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 Pt is currently resting in bed with his eyes closed. No signs or agitation or pain. Bed is low and locked, call bell in reach, tab alerts in place, bed alarm in place and on. Side rails up x3. Door remains open for continued monitoring. 8153 Schedule morning meds given, CNA assisting pt with breakfast. Safety measures in place. 1200 Pt resting with his eyes closed. No signs of distress or agitation noted. Family is at the bedside. Safety measures in place. Door remains closed while family is visiting.     1350 Family at the bedside. Pt difficult to awaken and he was not awake enough to eat lunch. Pt shows no signs of distress or pain. Safety measures in place. Door remains closed while family is at the bedside. 1500 Previous family members have left and pt's daughter Avtar Coburn is at the bedside. Pt is very difficult to awaken. Wet brief was changed and pt was repositioned. Pt opened his eyes for a brief moment and then went back to sleep. No signs of distress or agitation. 1700 Daughter has left the bedside after dinner tray arrived. Pt difficult to awaken, eyes will open for a few moments, pt mouths some words in a whisper then pt goes back to sleep. No  Signs or symptoms of pain or distress noted. FLACC 0/10. Safety measures in place. Door remains open for continued monitoring. .      1818 Scheduled dose of Roxanol given. Senna held due to pt not be able to swallow at this time. Pt is resting with his eyes open, whispers some words that this RN cannot understand. Safety measures in place. Door remains open for continued monitoring. Report given to Robel Rae.  RAJENDRA.

## 2021-03-22 NOTE — PROGRESS NOTES
Problem: Falls - Risk of  Goal: *Absence of Falls  Description: Patient will not have any falls or injuries during shift . Outcome: Progressing Towards Goal  Note: Fall Risk Interventions:  Mobility Interventions: Bed/chair exit alarm    Mentation Interventions: Adequate sleep, hydration, pain control, Bed/chair exit alarm, Door open when patient unattended, Evaluate medications/consider consulting pharmacy    Medication Interventions: Bed/chair exit alarm, Evaluate medications/consider consulting pharmacy    Elimination Interventions: Bed/chair exit alarm, Toileting schedule/hourly rounds              Problem: Pressure Injury - Risk of  Goal: *Prevention of pressure injury  Description: Patient will not develop any pressure injuries during stay.   Outcome: Progressing Towards Goal  Note: Pressure Injury Interventions:  Sensory Interventions: Assess changes in LOC, Avoid rigorous massage over bony prominences, Check visual cues for pain, Float heels, Minimize linen layers    Moisture Interventions: Absorbent underpads, Apply protective barrier, creams and emollients, Limit adult briefs, Minimize layers, Moisture barrier    Activity Interventions: Assess need for specialty bed    Mobility Interventions: Assess need for specialty bed, Float heels, HOB 30 degrees or less    Nutrition Interventions: Document food/fluid/supplement intake    Friction and Shear Interventions: Apply protective barrier, creams and emollients, HOB 30 degrees or less, Minimize layers

## 2021-03-22 NOTE — PROGRESS NOTES
5395- Report received, patient resting quietly in bed with no s/sx pain or distress. Awake, but calm Call light within reach. Bed is low and locked, tab alert in place, and door left open for continuous monitoring. 7919- scheduled medications given crushed with apple sauce. Patient ate about 75% of breakfast. Pleasantly confused at this time with inappropriate words. No s/sx of pain or agitation. Brief dry. Patient pulled up and tv turned to golf channel. 1106- Patient resting with eyes closed. No s/sx of pain or distress. 1214- scheduled roxanol given sl. Patient very drowsy. Awakened for brief change, but too drowsy to eat lunch. Darby Mccarty. At bedside. Patient turned to L side and wound care provided. Pt noted to have broken skin. Wound now stage 2. Barrier cream applied. 1430- Patient resting with eyes closed. No s/sx of pain or distress. No family at bedside. 1734- scheduled Roxanol given at this time. Darvin Canchola at bedside asking questions about patient condition. Pt noted to have mouth open and neck extended. Mouth is cyanotic and pt having approx 20 seconds of apnea. No s/sx of pain or resp distress. Feet are cool and mottled. Patient very drowsy and does not focus when opening his eyes. Darvin Canchola verbalizes understanding that demise is near. She will let her family know. Dr. Whitney Lim and Parisa Roy, ADELSO notified of changes.      200- report given to Shirleyann Bamberger

## 2021-03-23 NOTE — PROGRESS NOTES
Problem: Falls - Risk of  Goal: *Absence of Falls  Description: Patient will not have any falls or injuries during shift . Outcome: Progressing Towards Goal  Note: Fall Risk Interventions:  Mobility Interventions: Bed/chair exit alarm    Mentation Interventions: Adequate sleep, hydration, pain control, Bed/chair exit alarm, Door open when patient unattended, Evaluate medications/consider consulting pharmacy, Family/sitter at bedside, Room close to nurse's station    Medication Interventions: Bed/chair exit alarm, Evaluate medications/consider consulting pharmacy    Elimination Interventions: Bed/chair exit alarm, Call light in reach              Problem: Pressure Injury - Risk of  Goal: *Prevention of pressure injury  Description: Patient will not develop any pressure injuries during stay. Outcome: Progressing Towards Goal  Note: Pressure Injury Interventions:  Sensory Interventions: Assess changes in LOC, Float heels, Minimize linen layers, Pressure redistribution bed/mattress (bed type), Check visual cues for pain, Keep linens dry and wrinkle-free, Pad between skin to skin    Moisture Interventions: Absorbent underpads, Minimize layers, Apply protective barrier, creams and emollients, Moisture barrier, Limit adult briefs, Maintain skin hydration (lotion/cream)    Activity Interventions: Pressure redistribution bed/mattress(bed type)    Mobility Interventions: Pressure redistribution bed/mattress (bed type), Float heels    Nutrition Interventions: Document food/fluid/supplement intake    Friction and Shear Interventions: Apply protective barrier, creams and emollients, Minimize layers, Lift sheet                Problem: Pain  Goal: Verbalize satisfaction of level of comfort and symptom control  Description: Pt will remain free from pain aeb pain score or flacc less than 4 while at [facility].       Medications:  Roxanol 5 mg po scheduled q 6 hrs  Roxanol 5 mg po q 1 hr prn     Outcome: Progressing Towards Goal     Problem: Anxiety/Agitation  Goal: Verbalize and demonstrate ability to manage anxiety  Description: The patient's agitation/ anxiety will be controlled during shift aeb relaxed body and facial features and lack of trying to get out of bed.      Medications:    Haldol 2 mg po q 1 pr prn  Ativan 0.5 mg po q 6 hr prn  Outcome: Progressing Towards Goal     Problem: Breathing Pattern - Ineffective  Goal: *PALLIATIVE CARE:  Alleviation of Dyspnea  Outcome: Progressing Towards Goal

## 2021-03-23 NOTE — PROGRESS NOTES
Pt. remains in the [facility] under domicilary level of care. [facility] nursing staff report that Pt has made a significant decline and is no longer eating or drinking and is only minimally responsive at times. Pt was lying in bed with eyes closed with family at the bedside (son, daughter-in-law and granddaughter are visiting from [place]) when SW knocked and entered the room to visit. Pt is currently non-responsive and appears to be resting comfortably with no S/S of pain or distress noted as evidenced by relaxed body features, no facial grimacing, etc. Pt.'s family appear to have a good understanding of Pt.'s decline and are coping well at this time. SW provided support via active listening along with validation of their feelings and reassurance. SW will continue to provide support and assessment of psychosocial and bereavement concerns and needs until discharge.

## 2021-03-23 NOTE — PROGRESS NOTES
Problem: Pain  Goal: Verbalize satisfaction of level of comfort and symptom control  Description: Pt will remain free from pain aeb pain score or flacc less than 4 while at [facility]. Medications:  Roxanol 5 mg po scheduled q 6 hrs  Roxanol 5 mg po q 1 hr prn     Outcome: Progressing Towards Goal  Note: Pt pain would be less then 4/10  Roxanol 5 mg SL q 6 hours scheduled  Roxanol 5 mg SL q 1 hour prn     Problem: Anxiety/Agitation  Goal: Verbalize and demonstrate ability to manage anxiety  Description: The patient's agitation/ anxiety will be controlled during shift aeb relaxed body and facial features and lack of trying to get out of bed.      Medications:    Haldol 2 mg po q 1 pr prn  Ativan 0.5 mg po q 6 hr prn  Outcome: Progressing Towards Goal  Note: Pt would be relaxed as indicated by no moaning, groaning or attempting to get out of bed  Haldol 2 mg po q 1 hour prn      Problem: Breathing Pattern - Ineffective  Goal: *PALLIATIVE CARE:  Alleviation of Dyspnea  Outcome: Progressing Towards Goal

## 2021-03-23 NOTE — PROGRESS NOTES
Report received from off-going nurse,Jessie Galvan Rn  visual identification made, assumed care of pt. Pt resting quietly with eyes closed, no agitation or restlessness, no grimacing or groaning. Pt respirations unlabored. Tab alert in place, rails up x 2, bed in lowest position, safety maintained. FLACC 0. Son, Samantha Young, at bedside. States other siblings will be coming later today. Pt awakened and took sips of water from a syringe. No change to discharge plan to remain at [facility] until passing. 0935 pt resting quietly,pt not alert enough to take oral medication, physical assessment completed, lung sounds clear, heart sounds bounding and irregular, pt opens eyes but generally non responsive, abdomen soft hypoactive bowel sounds, extremities warm with palpable pulses. Pt daughter, Barbara Albrecht and son Melchor Worrell at bedside. 46  at bedside. 1100 pt resting quietly, family asking about giving pt water. Did mouth care with sponges, pt did not swallow water. Pt unable to swallow po medication. Informed NP Gladis Zee. 1310 pt resting quietly, no grimacing, groaning or agitation. 1553 pt with eyes open, pt has incontinent brief, with two assist, did incontinent care for saturated brief. With two assist turned and positioned pt on left side, positioned with pillows.      1756 pt eyes open, family at bedside, administered Roxanol po, and did mouth care     Report given to Luzmaria Rojo RN

## 2021-03-23 NOTE — PROGRESS NOTES
Timothy Hugheski 82 report from Adore Ambrosio RN. Pt Id by name and  during rounds. 2155  Pt lying in bed. Eyes closed. No facial grimace. Flacc =0-1. Resp irreg non labored on RA. Lungs diminished. HR irreg. BS hypo. No edema noted. Brief clean/dry. SR up x 2. Bed low/locked. Call light with in reach. Family at the bedside. 2325  Scheduled morphine 5 mg sl given. Pt resting comfortably. No facial grimace. No agitation. Flacc =0-1. Resp non labored irreg on RA. SR up x 2. Bed low/locked. Call light with in reach. Family at the bedside. 0120  Pt resting comfortably with eyes closed. No facial grimace. No moans. Flacc =0-1. Resp non labored irreg on RA. SR up x 2. Bed low/locked. Call light with in reach. Family at the bedside. 0305  Pt resting comfortably with eyes closed. No facial grimace. No moans. Flacc =0-1. Resp non labored irreg on RA. SR up x 2. Bed low/locked. Call light with in reach. Family at the bedside. 0530 Scheduled morphine 5 mg sl given. Pt resting comfortably. No facial grimace. No agitation. Flacc =0-1. Resp non labored irreg on RA. SR up x 2. Bed low/locked. Call light with in reach. Family at the bedside. Report given to Shara Burroughs RN and Chin Blakely RN.

## 2021-03-24 NOTE — PROGRESS NOTES
Received report from Carmen Pate RN and Jj Rogers RN. Pt Id by name and  during rounds.   Pt lying in bed with eyes closed. Non interactive. No facial grimace. Flacc =0-1. Resp shallow irreg on RA. Lungs diminished. HR irreg. BS hypo. No edema noted. Brief clean/dry. SR up x 2. Bed low/locked. Call light with in reach. Family at the bedside.   Pt resting comfortably. Eyes closed. No facial grimace. Flacc =0-1. Resp shallow irreg on RA. SR up x 2. Bed low/locked. Call light with in reach. Family at the bedside.   Scheduled morphine 5 mg sl given. Pt repositioned. Incontinent care completed with CNA. Pt resting comfortably in bed. No facial grimace. Flacc =0-1. Resp shallow irreg  non labored on RA. SR up x 2. Bed low/locked. Call light with in reach. Family at the bedside. 0120   Pt resting comfortably. Eyes closed. No facial grimace. Flacc =0-1. Resp shallow irreg on RA. SR up x 2. Bed low/locked. Call light with in reach. Family at the bedside. 0340  Pt resting comfortably. Eyes closed. No facial grimace. Flacc =0-1. Resp shallow irreg on RA. SR up x 2. Bed low/locked. Call light with in reach. Family at the bedside. 0520  Pt lying in bed with eyes opened. Non interactive. No facial grimace. No moans. Flacc =0-1. Resp shallow irreg non labored on RA. Scheduled morphine 5 mg sl given. Pt tolerated well. SR up x 2. Bed low/locked. Call light with in reach. Family at the bedside. Report given to Deni Layton RN.

## 2021-03-24 NOTE — PROGRESS NOTES
Progress Note    Patient: Richmond Grijalva MRN: [id number]  SSN: [id number]    YOB: 1935  Age: 81 y.o. Sex: male      Admit Date: 3/4/2021    LOS: 20 days     Subjective:     Unresponsive. NPO. No longer able to tolerate SL meds. Eyes are open and fixed. Family at bedside. Review of Systems:  Review of systems not obtained due to patient factors. Objective:     Vitals:    03/22/21 1742 03/23/21 0450 03/23/21 1700 03/24/21 0425   BP: 102/62 104/62 137/70 (!) 100/58   Pulse: 98 (!) 132 (!) 126 (!) 119   Resp: 13 16 16 16   Temp: 97.1 °F (36.2 °C) 99.2 °F (37.3 °C) (!) 96.5 °F (35.8 °C) 99.3 °F (37.4 °C)        Intake and Output:  Current Shift: 03/24 0701 - 03/24 1900  In: -   Out: 1 [Urine:1]  Last three shifts: 03/22 1901 - 03/24 0700  In: 6 [P.O.:6]  Out: -     Physical Exam:   GENERAL: moderate distress, appears stated age, Pale, unresponsive. Neck is hyperextended. LUNG: Coarse diminished breath sounds with labored respirations. 15 second periods of apnea. HEART: irregularly irregular rhythm, distant. ABDOMEN: soft, non-tender. Bowel sounds absent. : Incontinent  EXTREMITIES:  extremities with no cyanosis or edema. + pulses. SKIN: Pale. Warm to touch. Stage 1 areas to sacrum and bilateral heels. NEUROLOGIC: Unresponsive with no lateralizing deficits noted. Gaze is fixed. Bedbound. Lab/Data Review:  No new labs resulted in the last 24 hours. Assessment:     Principal Problem:    Primary cancer of unknown site and cell type ([facility] [address] (3/4/2021)    Active Problems:    Respiratory failure with hypoxia ([address] (3/4/2021)      Cancer associated pain (3/4/2021)      Malignant neoplasm metastatic to lung [facility]) (3/1/2021)      Overview: Innumerable bilateral pulmonary metastases diagnosed on CT chest       2/28/2021. Unknown primary.       Metastasis to lung of unknown origin [facility]) (3/4/6691)      Metabolic encephalopathy (1/1/0087)      Hospice care patient (3/7/2021) Bilateral pulmonary metastases ([facility] [address] (3/7/2021)      HCAP (healthcare-associated pneumonia) (3/7/2021)        Plan:     Current Facility-Administered Medications   Medication Dose Route Frequency    morphine injection 2 mg  2 mg SubCUTAneous Q20MIN PRN    Or    morphine injection 2 mg  2 mg IntraVENous Q20MIN PRN    haloperidol lactate (HALDOL) injection 2 mg  2 mg SubCUTAneous Q1H PRN    Or    haloperidol lactate (HALDOL) injection 2 mg  2 mg IntraVENous Q1H PRN    acetaminophen (TYLENOL) suppository 650 mg  650 mg Rectal Q3H PRN    haloperidol lactate (HALDOL) injection 2 mg  2 mg SubCUTAneous Q1H PRN    Or    haloperidol lactate (HALDOL) injection 2 mg  2 mg IntraVENous Q1H PRN    glycopyrrolate (ROBINUL) injection 0.2 mg  0.2 mg SubCUTAneous Q4H PRN    morphine injection 2 mg  2 mg SubCUTAneous Q6H    Or    morphine injection 2 mg  2 mg IntraVENous Q6H    bisacodyL (DULCOLAX) suppository 10 mg  10 mg Rectal PRN    nitroglycerin (NITROSTAT) tablet 0.4 mg  0.4 mg SubLINGual Q5MIN PRN    albuterol (PROVENTIL VENTOLIN) nebulizer solution 2.5 mg  2.5 mg Nebulization Q4H PRN    LORazepam (ATIVAN) injection 2 mg  2 mg IntraVENous Q10MIN PRN       3/14: Admitted to domiciliary level of care. 3/24 Change to [facility] for management of pain, dyspnea and agitation. 1. Pain: Morphine 2mg IV/SQ Q20 minutes as needed. 2. Dyspnea: Morphine 2mg IV/SQ Q20 minutes as needed. Duonebs q4 prn. Glycopyrrolate 0.2mg q4 prn secretions. Oxygen at 2 L/min prn.     3. Agitation: Haloperidol 2mg IV/SQ Q1 hour prn.     4. Family/Pt Support: Family at bedside during exam. Medications and plan of care discussed with nursing staff and family. Will continue to monitor for symptoms and adjust medications as needed to maintain patient comfort. PPS 10%. Case discussed with Dr. Susannah Oconnor. Expect patient demise in the next 12- 24 hours.        Signed By: Omar Lopez NP     March 24, 2021

## 2021-03-24 NOTE — PROGRESS NOTES
Problem: Falls - Risk of  Goal: *Absence of Falls  Description: Patient will not have any falls or injuries during shift . Outcome: Progressing Towards Goal  Note: Fall Risk Interventions:  Mobility Interventions: Bed/chair exit alarm    Mentation Interventions: Adequate sleep, hydration, pain control, Bed/chair exit alarm, Door open when patient unattended, Evaluate medications/consider consulting pharmacy, Family/sitter at bedside    Medication Interventions: Bed/chair exit alarm, Evaluate medications/consider consulting pharmacy    Elimination Interventions: Bed/chair exit alarm, Call light in reach              Problem: Pressure Injury - Risk of  Goal: *Prevention of pressure injury  Description: Patient will not develop any pressure injuries during stay. Outcome: Progressing Towards Goal  Note: Pressure Injury Interventions:  Sensory Interventions: Assess changes in LOC, Float heels, Minimize linen layers, Keep linens dry and wrinkle-free, Check visual cues for pain, Pad between skin to skin    Moisture Interventions: Absorbent underpads, Maintain skin hydration (lotion/cream), Minimize layers, Moisture barrier, Apply protective barrier, creams and emollients, Limit adult briefs    Activity Interventions: Pressure redistribution bed/mattress(bed type)    Mobility Interventions: Pressure redistribution bed/mattress (bed type), Float heels    Nutrition Interventions: Document food/fluid/supplement intake    Friction and Shear Interventions: Lift sheet, Minimize layers, Apply protective barrier, creams and emollients                Problem: Pain  Goal: Verbalize satisfaction of level of comfort and symptom control  Description: Pt will remain free from pain aeb pain score or flacc less than 4 while at [facility].       Medications:  Roxanol 5 mg po scheduled q 6 hrs  Roxanol 5 mg po q 1 hr prn     Outcome: Progressing Towards Goal     Problem: Anxiety/Agitation  Goal: Verbalize and demonstrate ability to manage anxiety  Description: The patient's agitation/ anxiety will be controlled during shift aeb relaxed body and facial features and lack of trying to get out of bed.      Medications:    Haldol 2 mg po q 1 pr prn  Ativan 0.5 mg po q 6 hr prn  Outcome: Progressing Towards Goal     Problem: Breathing Pattern - Ineffective  Goal: *PALLIATIVE CARE:  Alleviation of Dyspnea  Outcome: Progressing Towards Goal

## 2021-03-24 NOTE — PROGRESS NOTES
0645: Report received from off going RN. Pt admitted on 3/4/2021 for domiciliary care due to no caregivers available at home. 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 Pt did not require anything for symptom control during the night. Resting with eyes closed in bed, resps regular and non-labored. FLACC score 0/10.     9311: Pt crying out at times, facial grimacing observed. FLACC score 7/10. Morphine 5mg SL administered however pt coughing and choking upon PO meds. Head of bed raised. Son at bedside. Pt only opens eyes to pain, unable to eat or drink. Son aware of pt decline in condition. 0800: Pt resting with eyes closed presently, resps regular and non-labored. FLACC score 0/10 at this time. Son leaving for breakfast. Other son and spouse arrived to the room. Will ring the bell for any needs. 1013: Called to the room by family. State pt yells out and seems to be in pain periodically. Blankets moved and pt has moaning and facial grimacing. FLACC         score 7/10. Morphine 5mg SL and Lorazepam 0.5mg Sl administered to promote comfort. 1040: Family state pt is not comfortable. Provider arrived to the room. Will await further orders. Pt with facial grimacing and continues to moan at times. FLACC score 7/10. Pt changed to GIP level of care at this time. 1123: New orders received from provider. Scheduled Morphine 2mg SC administered at this time along with Haldol 2mg SC to promote comfort as pt continues to yell out, moan and have facial grimacing at times. FLACC score 6/10.     1200: Pt resting with eyes closed, resps with apneic pauses lasting 15-20 seconds. No s/sx of pain or other discomfort observed at this time. FLACC score 0/10. Family remain at bedside. 1330: Called into the room by family and they states they have timed when pt is moaning and feel that it is 30 minutes.  Offered to administer medication more frequently but family declined and stated they would wait. 1400: Brief changed for small amount of inc urine. Slight facial grimacing upon turning but once repositioned pt settled well. FLACC score 0/10. Family at bedside. No voiced requests at this time. 1500: Morphine 2mg SC and Haldol 2mg SC administered to promote comfort. Pt with moaning, facial grimacing and some agitation. FLACC score 6/10.     1539: Pt continues to moan at times and now has some gurgling. FLACC score 6/10. Morphine 2mg SC and Glycopyrrolate 0.2mg SC administered to promote comfort. Pt continues to have apneic periods lasting 20-30 seconds. Family remain in room. No voiced requests. 1600: Pt resting with eyes closed, resps remain apenic but no further pain or discomfort observed. No further gurgling at this time. FLACC score 0/10. Family remain in room. No voiced requests. 1817: Scheduled morphine administered. Family notified of drop in BP now 65/42. Pt also has cooling to his feet with mottling and pulse is weak. Family educated on physical signs of decline. Family voiced understanding. 1845: report given to oncoming RN.

## 2021-03-25 NOTE — PROGRESS NOTES
Report received from Larisa Manuel RN. Patient identified by name and . Patient in bed with eyes closed. Patient's respirations are fast and shallow. Patient respirations are  32 bpm at this time. Dr. Dona Benjamin at the bedside discussing patients care, comfort measures and,  expected decline. Oxygen at 3 lpm via NC. Patient incontinent wearing a brief with minimal output reported today. FLAAC 0/10. Safety measures in place including, door open for continuous monitoring, bed in low locked position, tab alert placed and functional, and side rails up x2. Will continue to monitor for changes and for needs.  Patient lying in bed with eyes closed and daughters at his bedside. Patient has not had any changes in his respiratory status since early this afternoon. He continues with exelerated respirations, he is warm/hot to touch in the trunk area but cold and mottled extremities. Patient with no response to any stimuli including pain. Family appears to be well educated on the dying process and are grieving appropriately. Safety measures in place including, door open for continuous monitoring, bed in low locked position, tab alert placed and functional, and side rails up x2. Will continue to monitor for changes and for needs. 2319 Patient lying in bed without change. Respirations remain even and shallow. No urinary output between 7pm and 11pm. Family refused a bath and requests to just keep the patient comfortable at this time as they are waiting for family to come. No needs voiced at this time. Scheduled Morphine 2mg SC given for comfort care. Safety measures in place including, door open for continuous monitoring, bed in low locked position, tab alert placed and functional, and side rails up x2. Will continue to monitor for changes and for needs. 0122 Patient lying on his back with his eyes closed. No change in he respiratory status. Family remains at the bedside.  Patient remains unresponsive to any stimuli at this time. Safety measures in place including, door open for continuous monitoring, bed in low locked position, tab alert placed and functional, and side rails up x2. Will continue to monitor for changes and for needs. 8984 Patient continues to have increased respirations and using accessory muscles. Patient is non responsive to any stimuli including pain. Family remains at bedside and denies any current needs at this time. Safety measures in place including, door open for continuous monitoring, bed in low locked position, tab alert placed and functional, and side rails up x2. Will continue to monitor for changes and for needs. 8087 Patient lying in bed on his back. Patient has not had any urine output since previous shift. Respirations have changed over the last hour, having increasing periods of apnea and it has become irregular. Family is at bedside and request patient not be turned since he is \"comfortable\" at this time. Family is grieving appropriately at this time. Safety measures in place including, door open for continuous monitoring, bed in low locked position, tab alert placed and functional, and side rails up x2. Will continue to monitor for changes and for needs. 0435 Patient's vital signs not registering on blood pressure machine. Respirations becoming more shallow with increasing periods of apnea. Patient's extremities are all mottled and cool to touch. Daughters have begun to call their siblings to come to see their father before he passes away. Will continue to monitor for comfort and provide support to family. 0507 Upon being called to the room by family patient is found to have no signs of life. Patient without pulse, heart activity, or respirations. Family is at bedside mourning appropriately.

## 2021-03-25 NOTE — PROGRESS NOTES
Problem: Falls - Risk of  Goal: *Absence of Falls  Description: Patient will not have any falls or injuries during shift . Outcome: Progressing Towards Goal  Note: Fall Risk Interventions:  Mobility Interventions: Bed/chair exit alarm, Mechanical lift, Patient to call before getting OOB    Mentation Interventions: Adequate sleep, hydration, pain control, Bed/chair exit alarm, Door open when patient unattended, Reorient patient, Room close to nurse's station    Medication Interventions: Bed/chair exit alarm, Evaluate medications/consider consulting pharmacy    Elimination Interventions: Bed/chair exit alarm, Call light in reach              Problem: Pressure Injury - Risk of  Goal: *Prevention of pressure injury  Description: Patient will not develop any pressure injuries during stay. Outcome: Progressing Towards Goal  Note: Pressure Injury Interventions:  Sensory Interventions: Assess changes in LOC, Float heels, Minimize linen layers, Keep linens dry and wrinkle-free, Check visual cues for pain, Pad between skin to skin    Moisture Interventions: Absorbent underpads, Maintain skin hydration (lotion/cream), Minimize layers, Moisture barrier, Apply protective barrier, creams and emollients, Limit adult briefs    Activity Interventions: Pressure redistribution bed/mattress(bed type)    Mobility Interventions: Pressure redistribution bed/mattress (bed type), Float heels    Nutrition Interventions: Document food/fluid/supplement intake    Friction and Shear Interventions: Lift sheet, Minimize layers, Apply protective barrier, creams and emollients                Problem: Pain  Goal: Verbalize satisfaction of level of comfort and symptom control  Description: Pt will remain free from pain aeb pain score or flacc less than 4 while at [facility].       Medications:  Roxanol 5 mg po scheduled q 6 hrs  Roxanol 5 mg po q 1 hr prn     Outcome: Progressing Towards Goal     Problem: Anxiety/Agitation  Goal: Verbalize and demonstrate ability to manage anxiety  Description: The patient's agitation/ anxiety will be controlled during shift aeb relaxed body and facial features and lack of trying to get out of bed. Medications:    Haldol 2 mg po q 1 pr prn  Ativan 0.5 mg po q 6 hr prn  Outcome: Progressing Towards Goal     Problem: End of Life Process  Goal: Demonstrate understanding of end of life processes  Description: Patient/caregiver will understand end of life processes. Outcome: Progressing Towards Goal     Problem: Breathing Pattern - Ineffective  Goal: *PALLIATIVE CARE:  Alleviation of Dyspnea  Outcome: Progressing Towards Goal     Problem: Falls - Risk of  Goal: *Absence of Falls  Description: Patient will not have any falls or injuries during shift .   3/25/2021 0441 by Esequiel Ruiz RN  Outcome: Progressing Towards Goal  Note: Fall Risk Interventions:  Mobility Interventions: Bed/chair exit alarm, Mechanical lift, Patient to call before getting OOB    Mentation Interventions: Adequate sleep, hydration, pain control, Bed/chair exit alarm, Door open when patient unattended, Reorient patient, Room close to nurse's station    Medication Interventions: Bed/chair exit alarm, Evaluate medications/consider consulting pharmacy    Elimination Interventions: Bed/chair exit alarm, Call light in reach           3/25/2021 0432 by Esequiel Ruiz RN  Outcome: Progressing Towards Goal  Note: Fall Risk Interventions:  Mobility Interventions: Bed/chair exit alarm, Mechanical lift, Patient to call before getting OOB    Mentation Interventions: Adequate sleep, hydration, pain control, Bed/chair exit alarm, Door open when patient unattended, Reorient patient, Room close to nurse's station    Medication Interventions: Bed/chair exit alarm, Evaluate medications/consider consulting pharmacy    Elimination Interventions: Bed/chair exit alarm, Call light in reach              Problem: Pressure Injury - Risk of  Goal: *Prevention of pressure injury  Description: Patient will not develop any pressure injuries during stay. 3/25/2021 0441 by Dennis Peres RN  Outcome: Progressing Towards Goal  Note: Pressure Injury Interventions:  Sensory Interventions: Assess changes in LOC, Float heels, Minimize linen layers, Keep linens dry and wrinkle-free, Check visual cues for pain, Pad between skin to skin    Moisture Interventions: Absorbent underpads, Maintain skin hydration (lotion/cream), Minimize layers, Moisture barrier, Apply protective barrier, creams and emollients, Limit adult briefs    Activity Interventions: Pressure redistribution bed/mattress(bed type)    Mobility Interventions: Pressure redistribution bed/mattress (bed type), Float heels    Nutrition Interventions: Document food/fluid/supplement intake    Friction and Shear Interventions: Lift sheet, Minimize layers, Apply protective barrier, creams and emollients             3/25/2021 0432 by Dennis Peres RN  Outcome: Progressing Towards Goal  Note: Pressure Injury Interventions:  Sensory Interventions: Assess changes in LOC, Float heels, Minimize linen layers, Keep linens dry and wrinkle-free, Check visual cues for pain, Pad between skin to skin    Moisture Interventions: Absorbent underpads, Maintain skin hydration (lotion/cream), Minimize layers, Moisture barrier, Apply protective barrier, creams and emollients, Limit adult briefs    Activity Interventions: Pressure redistribution bed/mattress(bed type)    Mobility Interventions: Pressure redistribution bed/mattress (bed type), Float heels    Nutrition Interventions: Document food/fluid/supplement intake    Friction and Shear Interventions: Lift sheet, Minimize layers, Apply protective barrier, creams and emollients                Problem: Pressure Injury - Risk of  Goal: *Prevention of pressure injury  Description: Patient will not develop any pressure injuries during stay.   3/25/2021 0441 by Kat Crandall RN  Outcome: Progressing Towards Goal  Note: Pressure Injury Interventions:  Sensory Interventions: Assess changes in LOC, Float heels, Minimize linen layers, Keep linens dry and wrinkle-free, Check visual cues for pain, Pad between skin to skin    Moisture Interventions: Absorbent underpads, Maintain skin hydration (lotion/cream), Minimize layers, Moisture barrier, Apply protective barrier, creams and emollients, Limit adult briefs    Activity Interventions: Pressure redistribution bed/mattress(bed type)    Mobility Interventions: Pressure redistribution bed/mattress (bed type), Float heels    Nutrition Interventions: Document food/fluid/supplement intake    Friction and Shear Interventions: Lift sheet, Minimize layers, Apply protective barrier, creams and emollients             3/25/2021 0432 by Kat Crandall RN  Outcome: Progressing Towards Goal  Note: Pressure Injury Interventions:  Sensory Interventions: Assess changes in LOC, Float heels, Minimize linen layers, Keep linens dry and wrinkle-free, Check visual cues for pain, Pad between skin to skin    Moisture Interventions: Absorbent underpads, Maintain skin hydration (lotion/cream), Minimize layers, Moisture barrier, Apply protective barrier, creams and emollients, Limit adult briefs    Activity Interventions: Pressure redistribution bed/mattress(bed type)    Mobility Interventions: Pressure redistribution bed/mattress (bed type), Float heels    Nutrition Interventions: Document food/fluid/supplement intake    Friction and Shear Interventions: Lift sheet, Minimize layers, Apply protective barrier, creams and emollients                Problem: Falls - Risk of  Goal: *Absence of Falls  Description: Patient will not have any falls or injuries during shift .   3/25/2021 0441 by Kat Crandall RN  Outcome: Progressing Towards Goal  Note: Fall Risk Interventions:  Mobility Interventions: Bed/chair exit alarm, Mechanical lift, Patient to call before getting OOB    Mentation Interventions: Adequate sleep, hydration, pain control, Bed/chair exit alarm, Door open when patient unattended, Reorient patient, Room close to nurse's station    Medication Interventions: Bed/chair exit alarm, Evaluate medications/consider consulting pharmacy    Elimination Interventions: Bed/chair exit alarm, Call light in reach           3/25/2021 0432 by Neil Barrera RN  Outcome: Progressing Towards Goal  Note: Fall Risk Interventions:  Mobility Interventions: Bed/chair exit alarm, Mechanical lift, Patient to call before getting OOB    Mentation Interventions: Adequate sleep, hydration, pain control, Bed/chair exit alarm, Door open when patient unattended, Reorient patient, Room close to nurse's station    Medication Interventions: Bed/chair exit alarm, Evaluate medications/consider consulting pharmacy    Elimination Interventions: Bed/chair exit alarm, Call light in reach              Problem: Falls - Risk of  Goal: *Absence of Falls  Description: Patient will not have any falls or injuries during shift .   3/25/2021 0441 by Neil Barrera RN  Outcome: Progressing Towards Goal  Note: Fall Risk Interventions:  Mobility Interventions: Bed/chair exit alarm, Mechanical lift, Patient to call before getting OOB    Mentation Interventions: Adequate sleep, hydration, pain control, Bed/chair exit alarm, Door open when patient unattended, Reorient patient, Room close to nurse's station    Medication Interventions: Bed/chair exit alarm, Evaluate medications/consider consulting pharmacy    Elimination Interventions: Bed/chair exit alarm, Call light in reach           3/25/2021 0432 by Neil Barrera RN  Outcome: Progressing Towards Goal  Note: Fall Risk Interventions:  Mobility Interventions: Bed/chair exit alarm, Mechanical lift, Patient to call before getting OOB    Mentation Interventions: Adequate sleep, hydration, pain control, Bed/chair exit alarm, Door open when patient unattended, Reorient patient, Room close to nurse's station    Medication Interventions: Bed/chair exit alarm, Evaluate medications/consider consulting pharmacy    Elimination Interventions: Bed/chair exit alarm, Call light in reach              Problem: Falls - Risk of  Goal: *Absence of Falls  Description: Patient will not have any falls or injuries during shift . 3/25/2021 0441 by Erendira Brambila RN  Outcome: Progressing Towards Goal  Note: Fall Risk Interventions:  Mobility Interventions: Bed/chair exit alarm, Mechanical lift, Patient to call before getting OOB    Mentation Interventions: Adequate sleep, hydration, pain control, Bed/chair exit alarm, Door open when patient unattended, Reorient patient, Room close to nurse's station    Medication Interventions: Bed/chair exit alarm, Evaluate medications/consider consulting pharmacy    Elimination Interventions: Bed/chair exit alarm, Call light in reach           3/25/2021 0432 by Erendira Brambila RN  Outcome: Progressing Towards Goal  Note: Fall Risk Interventions:  Mobility Interventions: Bed/chair exit alarm, Mechanical lift, Patient to call before getting OOB    Mentation Interventions: Adequate sleep, hydration, pain control, Bed/chair exit alarm, Door open when patient unattended, Reorient patient, Room close to nurse's station    Medication Interventions: Bed/chair exit alarm, Evaluate medications/consider consulting pharmacy    Elimination Interventions: Bed/chair exit alarm, Call light in reach              Problem: Falls - Risk of  Goal: *Absence of Falls  Description: Patient will not have any falls or injuries during shift .   3/25/2021 0443 by Erendira Brambila RN  Outcome: Progressing Towards Goal  Note: Fall Risk Interventions:  Mobility Interventions: Bed/chair exit alarm, Mechanical lift, Patient to call before getting OOB    Mentation Interventions: Adequate sleep, hydration, pain control, Bed/chair exit alarm, Door open when patient unattended, Reorient patient, Room close to nurse's station    Medication Interventions: Bed/chair exit alarm, Evaluate medications/consider consulting pharmacy    Elimination Interventions: Bed/chair exit alarm, Call light in reach           3/25/2021 0441 by Raghavendra Joseph RN  Outcome: Progressing Towards Goal  Note: Fall Risk Interventions:  Mobility Interventions: Bed/chair exit alarm, Mechanical lift, Patient to call before getting OOB    Mentation Interventions: Adequate sleep, hydration, pain control, Bed/chair exit alarm, Door open when patient unattended, Reorient patient, Room close to nurse's station    Medication Interventions: Bed/chair exit alarm, Evaluate medications/consider consulting pharmacy    Elimination Interventions: Bed/chair exit alarm, Call light in reach           3/25/2021 0432 by Raghavendra Joseph RN  Outcome: Progressing Towards Goal  Note: Fall Risk Interventions:  Mobility Interventions: Bed/chair exit alarm, Mechanical lift, Patient to call before getting OOB    Mentation Interventions: Adequate sleep, hydration, pain control, Bed/chair exit alarm, Door open when patient unattended, Reorient patient, Room close to nurse's station    Medication Interventions: Bed/chair exit alarm, Evaluate medications/consider consulting pharmacy    Elimination Interventions: Bed/chair exit alarm, Call light in reach